# Patient Record
Sex: FEMALE | Race: WHITE | NOT HISPANIC OR LATINO | Employment: OTHER | URBAN - METROPOLITAN AREA
[De-identification: names, ages, dates, MRNs, and addresses within clinical notes are randomized per-mention and may not be internally consistent; named-entity substitution may affect disease eponyms.]

---

## 2017-02-02 ENCOUNTER — APPOINTMENT (OUTPATIENT)
Dept: LAB | Facility: CLINIC | Age: 62
End: 2017-02-02
Payer: COMMERCIAL

## 2017-02-02 ENCOUNTER — TRANSCRIBE ORDERS (OUTPATIENT)
Dept: ADMINISTRATIVE | Facility: HOSPITAL | Age: 62
End: 2017-02-02

## 2017-02-02 DIAGNOSIS — E11.49 DIABETIC NEUROPATHY WITH NEUROLOGIC COMPLICATION (HCC): ICD-10-CM

## 2017-02-02 DIAGNOSIS — E11.9 DIABETES MELLITUS WITHOUT COMPLICATION (HCC): Primary | ICD-10-CM

## 2017-02-02 DIAGNOSIS — E11.40 DIABETIC NEUROPATHY WITH NEUROLOGIC COMPLICATION (HCC): ICD-10-CM

## 2017-02-02 DIAGNOSIS — Z79.4 ENCOUNTER FOR LONG-TERM (CURRENT) USE OF INSULIN (HCC): ICD-10-CM

## 2017-02-02 DIAGNOSIS — E11.40 TYPE 2 DIABETES MELLITUS WITH DIABETIC NEUROPATHY, UNSPECIFIED LONG TERM INSULIN USE STATUS: ICD-10-CM

## 2017-02-02 DIAGNOSIS — E11.9 DIABETES MELLITUS WITHOUT COMPLICATION (HCC): ICD-10-CM

## 2017-02-02 LAB
ALBUMIN SERPL BCP-MCNC: 3.8 G/DL (ref 3.5–5)
ALP SERPL-CCNC: 70 U/L (ref 46–116)
ALT SERPL W P-5'-P-CCNC: 16 U/L (ref 12–78)
ANION GAP SERPL CALCULATED.3IONS-SCNC: 6 MMOL/L (ref 4–13)
AST SERPL W P-5'-P-CCNC: 11 U/L (ref 5–45)
BILIRUB SERPL-MCNC: 0.29 MG/DL (ref 0.2–1)
BUN SERPL-MCNC: 12 MG/DL (ref 5–25)
CALCIUM SERPL-MCNC: 9.3 MG/DL (ref 8.3–10.1)
CHLORIDE SERPL-SCNC: 105 MMOL/L (ref 100–108)
CO2 SERPL-SCNC: 29 MMOL/L (ref 21–32)
CREAT SERPL-MCNC: 0.65 MG/DL (ref 0.6–1.3)
EST. AVERAGE GLUCOSE BLD GHB EST-MCNC: 186 MG/DL
GFR SERPL CREATININE-BSD FRML MDRD: >60 ML/MIN/1.73SQ M
GLUCOSE SERPL-MCNC: 69 MG/DL (ref 65–140)
HBA1C MFR BLD: 8.1 % (ref 4.2–6.3)
POTASSIUM SERPL-SCNC: 4.2 MMOL/L (ref 3.5–5.3)
PROT SERPL-MCNC: 7.3 G/DL (ref 6.4–8.2)
SODIUM SERPL-SCNC: 140 MMOL/L (ref 136–145)

## 2017-02-02 PROCEDURE — 36415 COLL VENOUS BLD VENIPUNCTURE: CPT

## 2017-02-02 PROCEDURE — 83036 HEMOGLOBIN GLYCOSYLATED A1C: CPT

## 2017-02-02 PROCEDURE — 80053 COMPREHEN METABOLIC PANEL: CPT

## 2017-05-19 ENCOUNTER — APPOINTMENT (OUTPATIENT)
Dept: LAB | Facility: CLINIC | Age: 62
End: 2017-05-19
Payer: COMMERCIAL

## 2017-05-19 ENCOUNTER — TRANSCRIBE ORDERS (OUTPATIENT)
Dept: ADMINISTRATIVE | Facility: HOSPITAL | Age: 62
End: 2017-05-19

## 2017-05-19 DIAGNOSIS — E11.49 OTHER DIABETIC NEUROLOGICAL COMPLICATION ASSOCIATED WITH TYPE 2 DIABETES MELLITUS (HCC): ICD-10-CM

## 2017-05-19 DIAGNOSIS — Z79.4 ENCOUNTER FOR LONG-TERM (CURRENT) USE OF INSULIN (HCC): ICD-10-CM

## 2017-05-19 DIAGNOSIS — E11.9 DIABETES MELLITUS WITHOUT COMPLICATION (HCC): Primary | ICD-10-CM

## 2017-05-19 DIAGNOSIS — E11.9 DIABETES MELLITUS WITHOUT COMPLICATION (HCC): ICD-10-CM

## 2017-05-19 LAB
ALBUMIN SERPL BCP-MCNC: 3.5 G/DL (ref 3.5–5)
ALP SERPL-CCNC: 65 U/L (ref 46–116)
ALT SERPL W P-5'-P-CCNC: 17 U/L (ref 12–78)
ANION GAP SERPL CALCULATED.3IONS-SCNC: 6 MMOL/L (ref 4–13)
AST SERPL W P-5'-P-CCNC: 13 U/L (ref 5–45)
BILIRUB SERPL-MCNC: 0.47 MG/DL (ref 0.2–1)
BUN SERPL-MCNC: 17 MG/DL (ref 5–25)
CALCIUM SERPL-MCNC: 9.1 MG/DL (ref 8.3–10.1)
CHLORIDE SERPL-SCNC: 104 MMOL/L (ref 100–108)
CHOLEST SERPL-MCNC: 179 MG/DL (ref 50–200)
CO2 SERPL-SCNC: 31 MMOL/L (ref 21–32)
CREAT SERPL-MCNC: 0.7 MG/DL (ref 0.6–1.3)
CREAT UR-MCNC: 119 MG/DL
EST. AVERAGE GLUCOSE BLD GHB EST-MCNC: 183 MG/DL
GFR SERPL CREATININE-BSD FRML MDRD: >60 ML/MIN/1.73SQ M
GLUCOSE P FAST SERPL-MCNC: 95 MG/DL (ref 65–99)
HBA1C MFR BLD: 8 % (ref 4.2–6.3)
HDLC SERPL-MCNC: 62 MG/DL (ref 40–60)
LDLC SERPL CALC-MCNC: 107 MG/DL (ref 0–100)
MICROALBUMIN UR-MCNC: 6.2 MG/L (ref 0–20)
MICROALBUMIN/CREAT 24H UR: 5 MG/G CREATININE (ref 0–30)
POTASSIUM SERPL-SCNC: 4.4 MMOL/L (ref 3.5–5.3)
PROT SERPL-MCNC: 7.1 G/DL (ref 6.4–8.2)
SODIUM SERPL-SCNC: 141 MMOL/L (ref 136–145)
TRIGL SERPL-MCNC: 52 MG/DL
TSH SERPL DL<=0.05 MIU/L-ACNC: 2.4 UIU/ML (ref 0.36–3.74)

## 2017-05-19 PROCEDURE — 82570 ASSAY OF URINE CREATININE: CPT | Performed by: INTERNAL MEDICINE

## 2017-05-19 PROCEDURE — 82043 UR ALBUMIN QUANTITATIVE: CPT | Performed by: INTERNAL MEDICINE

## 2017-05-19 PROCEDURE — 84443 ASSAY THYROID STIM HORMONE: CPT

## 2017-05-19 PROCEDURE — 80061 LIPID PANEL: CPT

## 2017-05-19 PROCEDURE — 36415 COLL VENOUS BLD VENIPUNCTURE: CPT

## 2017-05-19 PROCEDURE — 80053 COMPREHEN METABOLIC PANEL: CPT

## 2017-05-19 PROCEDURE — 83036 HEMOGLOBIN GLYCOSYLATED A1C: CPT

## 2017-05-30 ENCOUNTER — OFFICE VISIT (OUTPATIENT)
Dept: URGENT CARE | Facility: CLINIC | Age: 62
End: 2017-05-30
Payer: COMMERCIAL

## 2017-05-30 PROCEDURE — 99283 EMERGENCY DEPT VISIT LOW MDM: CPT

## 2017-05-30 PROCEDURE — G0382 LEV 3 HOSP TYPE B ED VISIT: HCPCS

## 2017-06-01 ENCOUNTER — OFFICE VISIT (OUTPATIENT)
Dept: URGENT CARE | Facility: CLINIC | Age: 62
End: 2017-06-01
Payer: COMMERCIAL

## 2017-06-01 PROCEDURE — G0382 LEV 3 HOSP TYPE B ED VISIT: HCPCS

## 2017-06-01 PROCEDURE — 99283 EMERGENCY DEPT VISIT LOW MDM: CPT

## 2017-09-27 ENCOUNTER — TRANSCRIBE ORDERS (OUTPATIENT)
Dept: LAB | Facility: CLINIC | Age: 62
End: 2017-09-27

## 2017-09-27 ENCOUNTER — APPOINTMENT (OUTPATIENT)
Dept: LAB | Facility: CLINIC | Age: 62
End: 2017-09-27
Payer: COMMERCIAL

## 2017-09-27 DIAGNOSIS — E11.8 TYPE 2 DIABETES MELLITUS WITH COMPLICATION, UNSPECIFIED LONG TERM INSULIN USE STATUS: ICD-10-CM

## 2017-09-27 DIAGNOSIS — E11.8 TYPE 2 DIABETES MELLITUS WITH COMPLICATION, UNSPECIFIED LONG TERM INSULIN USE STATUS: Primary | ICD-10-CM

## 2017-09-27 LAB
ALBUMIN SERPL BCP-MCNC: 3.5 G/DL (ref 3.5–5)
ALP SERPL-CCNC: 61 U/L (ref 46–116)
ALT SERPL W P-5'-P-CCNC: 14 U/L (ref 12–78)
ANION GAP SERPL CALCULATED.3IONS-SCNC: 6 MMOL/L (ref 4–13)
AST SERPL W P-5'-P-CCNC: 17 U/L (ref 5–45)
BILIRUB SERPL-MCNC: 0.44 MG/DL (ref 0.2–1)
BUN SERPL-MCNC: 10 MG/DL (ref 5–25)
CALCIUM SERPL-MCNC: 9.3 MG/DL (ref 8.3–10.1)
CHLORIDE SERPL-SCNC: 107 MMOL/L (ref 100–108)
CO2 SERPL-SCNC: 28 MMOL/L (ref 21–32)
CREAT SERPL-MCNC: 0.76 MG/DL (ref 0.6–1.3)
EST. AVERAGE GLUCOSE BLD GHB EST-MCNC: 192 MG/DL
GFR SERPL CREATININE-BSD FRML MDRD: 83 ML/MIN/1.73SQ M
GLUCOSE SERPL-MCNC: 79 MG/DL (ref 65–140)
HBA1C MFR BLD: 8.3 % (ref 4.2–6.3)
POTASSIUM SERPL-SCNC: 4.2 MMOL/L (ref 3.5–5.3)
PROT SERPL-MCNC: 7.1 G/DL (ref 6.4–8.2)
SODIUM SERPL-SCNC: 141 MMOL/L (ref 136–145)

## 2017-09-27 PROCEDURE — 80053 COMPREHEN METABOLIC PANEL: CPT

## 2017-09-27 PROCEDURE — 83036 HEMOGLOBIN GLYCOSYLATED A1C: CPT | Performed by: INTERNAL MEDICINE

## 2017-09-27 PROCEDURE — 36415 COLL VENOUS BLD VENIPUNCTURE: CPT | Performed by: INTERNAL MEDICINE

## 2018-03-05 ENCOUNTER — APPOINTMENT (OUTPATIENT)
Dept: LAB | Facility: CLINIC | Age: 63
End: 2018-03-05
Payer: COMMERCIAL

## 2018-03-05 ENCOUNTER — TRANSCRIBE ORDERS (OUTPATIENT)
Dept: ADMINISTRATIVE | Facility: HOSPITAL | Age: 63
End: 2018-03-05

## 2018-03-05 DIAGNOSIS — E11.9 DIABETES MELLITUS WITHOUT COMPLICATION (HCC): ICD-10-CM

## 2018-03-05 DIAGNOSIS — Z79.4 ENCOUNTER FOR LONG-TERM (CURRENT) USE OF INSULIN (HCC): ICD-10-CM

## 2018-03-05 DIAGNOSIS — E11.49 OTHER DIABETIC NEUROLOGICAL COMPLICATION ASSOCIATED WITH TYPE 2 DIABETES MELLITUS (HCC): ICD-10-CM

## 2018-03-05 DIAGNOSIS — Z79.4 ENCOUNTER FOR LONG-TERM (CURRENT) USE OF INSULIN (HCC): Primary | ICD-10-CM

## 2018-03-05 LAB
ALBUMIN SERPL BCP-MCNC: 3.6 G/DL (ref 3.5–5)
ALP SERPL-CCNC: 95 U/L (ref 46–116)
ALT SERPL W P-5'-P-CCNC: 13 U/L (ref 12–78)
ANION GAP SERPL CALCULATED.3IONS-SCNC: 4 MMOL/L (ref 4–13)
AST SERPL W P-5'-P-CCNC: 16 U/L (ref 5–45)
BILIRUB SERPL-MCNC: 0.55 MG/DL (ref 0.2–1)
BUN SERPL-MCNC: 14 MG/DL (ref 5–25)
CALCIUM SERPL-MCNC: 9 MG/DL (ref 8.3–10.1)
CHLORIDE SERPL-SCNC: 101 MMOL/L (ref 100–108)
CO2 SERPL-SCNC: 31 MMOL/L (ref 21–32)
CREAT SERPL-MCNC: 0.95 MG/DL (ref 0.6–1.3)
EST. AVERAGE GLUCOSE BLD GHB EST-MCNC: 192 MG/DL
GFR SERPL CREATININE-BSD FRML MDRD: 63 ML/MIN/1.73SQ M
GLUCOSE SERPL-MCNC: 320 MG/DL (ref 65–140)
HBA1C MFR BLD: 8.3 % (ref 4.2–6.3)
POTASSIUM SERPL-SCNC: 4.4 MMOL/L (ref 3.5–5.3)
PROT SERPL-MCNC: 6.8 G/DL (ref 6.4–8.2)
SODIUM SERPL-SCNC: 136 MMOL/L (ref 136–145)

## 2018-03-05 PROCEDURE — 80053 COMPREHEN METABOLIC PANEL: CPT

## 2018-03-05 PROCEDURE — 36415 COLL VENOUS BLD VENIPUNCTURE: CPT

## 2018-03-05 PROCEDURE — 83036 HEMOGLOBIN GLYCOSYLATED A1C: CPT

## 2018-03-08 ENCOUNTER — OFFICE VISIT (OUTPATIENT)
Dept: ENDOCRINOLOGY | Facility: HOSPITAL | Age: 63
End: 2018-03-08
Payer: COMMERCIAL

## 2018-03-08 VITALS
SYSTOLIC BLOOD PRESSURE: 120 MMHG | HEART RATE: 74 BPM | BODY MASS INDEX: 39.8 KG/M2 | WEIGHT: 253.6 LBS | DIASTOLIC BLOOD PRESSURE: 90 MMHG | HEIGHT: 67 IN

## 2018-03-08 DIAGNOSIS — E11.40 TYPE 2 DIABETES MELLITUS WITH DIABETIC NEUROPATHY, WITH LONG-TERM CURRENT USE OF INSULIN (HCC): Primary | ICD-10-CM

## 2018-03-08 DIAGNOSIS — E78.5 HYPERLIPIDEMIA, UNSPECIFIED HYPERLIPIDEMIA TYPE: ICD-10-CM

## 2018-03-08 DIAGNOSIS — Z79.4 TYPE 2 DIABETES MELLITUS WITH DIABETIC NEUROPATHY, WITH LONG-TERM CURRENT USE OF INSULIN (HCC): Primary | ICD-10-CM

## 2018-03-08 PROBLEM — I89.0 LYMPHEDEMA: Status: ACTIVE | Noted: 2018-03-08

## 2018-03-08 PROCEDURE — 99204 OFFICE O/P NEW MOD 45 MIN: CPT | Performed by: INTERNAL MEDICINE

## 2018-03-08 RX ORDER — CHLORAL HYDRATE 500 MG
1000 CAPSULE ORAL DAILY
COMMUNITY

## 2018-03-08 NOTE — PATIENT INSTRUCTIONS
Get back on track with regular schedule and diet  Check blood sugars more 3-4 times a day  Increase activity as able with the warmer weather  Medic alert bracelet or necklace recommended  Continue the same insulin doses and metformin for now  Follow up in 4 months with blood work

## 2018-03-08 NOTE — PROGRESS NOTES
3/8/2018    Assessment/Plan      Diagnoses and all orders for this visit:    Type 2 diabetes mellitus with diabetic neuropathy, with long-term current use of insulin ([facility] [address] )  -     Comprehensive metabolic panel Lab Collect; Future  -     HEMOGLOBIN A1C W/ EAG ESTIMATION Lab Collect; Future  -     TSH, 3rd generation Lab Collect; Future  -     Microalbumin / creatinine urine ratio Lab Collect; Future  -     Lipid panel Lab Collect Lab Collect; Future  -     insulin aspart (NovoLOG) 100 Units/mL SOPN; Inject 2-7 Units under the skin 4 (four) times a day  -     insulin detemir (LEVEMIR FLEXTOUCH) subcutaneous injection pen 100 units/mL; Inject 24 Units under the skin daily at bedtime  -     metFORMIN (GLUCOPHAGE) 1000 MG tablet; Take 1 tablet (1,000 mg total) by mouth 2 (two) times a day with meals  -     Insulin Pen Needle ([employer] ULTRA-FINE PEN NEEDLES) 29G X 12 7MM MISC; Use up to 5 times a day  -     glucose blood (TRUE METRIX BLOOD GLUCOSE TEST) test strip; Use as instructed up to 4 times a day    Hyperlipidemia, unspecified hyperlipidemia type  -     Comprehensive metabolic panel Lab Collect; Future  -     Lipid panel Lab Collect Lab Collect; Future    Other orders  -     Calcium Carb-Cholecalciferol (CALCIUM 1000 + D PO); Take by mouth  -     Omega-3 Fatty Acids (FISH OIL) 1,000 mg; Take 1,000 mg by mouth daily  -     Discontinue: metFORMIN (GLUCOPHAGE) 1000 MG tablet; Take 1,000 mg by mouth 2 (two) times a day with meals  -     Discontinue: insulin aspart (NovoLOG) 100 units/mL injection; Inject 2-7 Units under the skin 4 (four) times a day        Assessment/Plan:  1  Type 2 diabetes insulin requiring  Her diabetes control is not the best with a hemoglobin A1c of 8 3%  It has not changed since last visit in October  She admits to dietary indiscretion over the holidays especially with the recent girl  cookies season    She also unfortunately has had a poor schedule in eating habits over the last several months with helping the local [employer] and has not been checking her sugars  I think this has contributed to a lack of improvement in her diabetes control  I discussed with her the with that I would like her hemoglobin A1c around 7%  At this point I will not adjust her insulin dosages are Metformin dosages  I have asked her to get back on track with regular schedule in diet  I have asked her  to increase activity as the warmer weather ensues  I have answered start checking her blood sugars 3 to 4 times a day regularly again  I have recommended that she wear a medic Alert bracelet or necklace regularly  I offer dietary consultation which she has refused at this point  2   Diabetic neuropathy  Her symptoms of diabetic neuropathy are stable  Hopefully they could improve with what with improvement in her diabetes control  I have discussed that with her that there are possible medications to use for the symptoms, she has decided not to use these at this point  I will have her follow up in 4 months with preceding hemoglobin A1c, CMP, TSH, lipid profile, and urine microalbumin to creatinine ratio  CC: Diabetes Consult    History of Present Illness     HPI: Tarry Hashimoto is a 57y o  year old female with type 2 diabetes for 17 years  She is on oral agents and insulin at home and takes Metformin 1000 mg BID, Levemir insulin 20-24 units at [facility], and Novolog insulin 4-6 units at breakfast and lunch, 2-3 units in the afternoon, and 5-7 units at supper  She denies any  polydipsia,  and blurry vision  She has no polyphagia  She has 4 times per night nocturia and polyuria  She has numbness and tingling of her feet without and occasional ice pick pain sensations in her feet at night  She has no chest pain or shortness of breath  She denies nephropathy, retinopathy, heart attack, stroke and claudication but does admit to neuropathy    Last few months, very poor dietary habits and 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  Hypoglycemic episodes: Yes recently within the last month, 10 lows in the early am around 4 am, otherwise not too often and in the afternooon  H/o of hypoglycemia causing hospitalization or intervention such as glucagon injection  or ambulance call  No   Hypoglycemia symptoms: sweating and bright light in vision or bluury vision  Treatment of hypoglycemia: drink a regularsoda     Glucagon:No   Medic alert tag: recommended,No      The patient's last eye exam was in October 2017 and no retinopathy     The patient's last foot exam was in has not seen one in a few years    Last A1C was   Lab Results   Component Value Date    HGBA1C 8 3 (H) 03/05/2018     Blood Sugar/Glucometer/Pump/CGM review: checks blood sugars at least 3 times a day, extra tests as needed  Recently not as good at testing  No blood sugar record with her today  Before breakfast: low at 4 am more  100 or less  Before lunch: no recent testing  Before dinner: varies, 70-80 and up to 110-120  Bedtime: 170-180 if eats after supper    Review of Systems   Constitutional: Negative for activity change, appetite change, diaphoresis, fatigue and unexpected weight change  HENT: Negative for ear pain, hearing loss, tinnitus and trouble swallowing  Eyes: Negative for visual disturbance  Respiratory: Positive for cough  Negative for choking, chest tightness and shortness of breath  Lots of post nasal drip causes coughing  Cardiovascular: Positive for palpitations and leg swelling  Negative for chest pain  Episode last night of tachycardia to 120, not normally  Resolved on own with rest  No change in chronic lymphedema  Uses furosemide as needed,worse in summer months  Gastrointestinal: Negative for abdominal pain, constipation, diarrhea, nausea and vomiting  Endocrine: Positive for polyuria  Negative for polydipsia and polyphagia  Nocturia up to 4 times a night     Musculoskeletal: Positive for arthralgias  Negative for back pain and myalgias  Bilateral knee pain  Skin: Negative for rash  Itchy skin hands, feet, and wrists  Will scratch it  Neurological: Positive for tremors and numbness  Negative for dizziness, light-headedness and headaches  Tingling of feet, Ice pick like pain in feet at times  Has a benign essential tremor  Psychiatric/Behavioral: Negative for sleep disturbance  The patient is not nervous/anxious          Historical Information   Past Medical History:   Diagnosis Date    Asthma     seasonal    Diabetes mellitus (HCC)     Osteoarthritis     Tremor, hereditary, benign      Past Surgical History:   Procedure Laterality Date     SECTION      3    TOE SURGERY Bilateral     5 th toe bone removed    TOTAL HIP ARTHROPLASTY Right     TUBAL LIGATION Bilateral      Social History   History   Alcohol Use    Yes     Comment: socially     History   Drug Use No     History   Smoking Status    Former Smoker    Packs/day:     Years: 15 00    Types: Cigarettes    Quit date: 2007   Smokeless Tobacco    Never Used     Family History:   Family History   Problem Relation Age of Onset    Cirrhosis Mother     Heart disease Mother     Esophageal varices Mother     Brain cancer Father     Prostate cancer Father     Stroke Father     Heart attack Father     Skin cancer Brother     COPD Brother     Diabetes type II Maternal Aunt     Diabetes type II Maternal Grandfather     Parkinsonism Maternal Grandfather     Coronary artery disease Brother     Post-traumatic stress disorder Son     No Known Problems Son     No Known Problems Daughter     Parkinsonism Maternal Aunt     Diabetes type I Cousin        Meds/Allergies   Current Outpatient Prescriptions   Medication Sig Dispense Refill    aspirin 81 MG tablet Take 81 mg by mouth 2 (two) times a day      Biotin 5000 MCG CAPS Take 5,000 capsules by mouth 2 (two) times a day      Calcium Carb-Cholecalciferol (CALCIUM 1000 + D PO) Take by mouth      ferrous sulfate 325 (65 Fe) mg tablet Take 325 mg by mouth 2 (two) times a day      folic acid (FOLVITE) 100 mcg tablet Take 400 mcg by mouth 2 (two) times a day      metFORMIN (GLUCOPHAGE) 1000 MG tablet Take 1 tablet (1,000 mg total) by mouth 2 (two) times a day with meals 180 tablet 3    Omega-3 Fatty Acids (FISH OIL) 1,000 mg Take 1,000 mg by mouth daily      glucose blood (TRUE METRIX BLOOD GLUCOSE TEST) test strip Use as instructed up to 4 times a day 400 each 3    insulin aspart (NovoLOG) 100 Units/mL SOPN Inject 2-7 Units under the skin 4 (four) times a day 20 pen 3    insulin detemir (LEVEMIR FLEXTOUCH) subcutaneous injection pen 100 units/mL Inject 24 Units under the skin daily at bedtime 10 pen 3    Insulin Pen Needle (BD ULTRA-FINE PEN NEEDLES) 29G X 12 7MM MISC Use up to 5 times a day 500 each 3    methocarbamol (ROBAXIN) 500 mg tablet Take 1 tablet by mouth 4 (four) times a day as needed for muscle spasms 20 tablet 0     No current facility-administered medications for this visit  Allergies   Allergen Reactions    Actos [Pioglitazone] Edema     Severe lower extremity    Oxycodone GI Intolerance    Sulfa Antibiotics        Objective   Vitals: Blood pressure 120/90, pulse 74, height 5' 7 32" (1 71 m), weight 115 kg (253 lb 9 6 oz)  Invasive Devices          No matching active lines, drains, or airways          Physical Exam   Constitutional: She is oriented to person, place, and time  She appears well-developed and well-nourished  HENT:   Head: Normocephalic and atraumatic  Eyes: Conjunctivae and EOM are normal  Pupils are equal, round, and reactive to light  Neck: Normal range of motion  Neck supple  No thyromegaly present  No carotid bruits  Cardiovascular: Normal rate, regular rhythm, normal heart sounds and intact distal pulses  No murmur heard    BP recheck 122/84   Pulmonary/Chest: Effort normal and breath sounds normal  She has no wheezes  Abdominal: Soft  Bowel sounds are normal  There is no tenderness  Musculoskeletal: Normal range of motion  She exhibits edema and deformity  Bilateral 1st metatarsal phalangeal joint bunions  No ulcerations  Small callus on medial part of 1st toe  2+ edema of legs  Lymphadenopathy:     She has no cervical adenopathy  Neurological: She is alert and oriented to person, place, and time  She has normal reflexes  Vibration sensation diminished ti the bilateral lower extremities at DIP  Skin: Skin is warm and dry  No rash noted  Excoriations of dorsum of feet and hands from scratching  Vitals reviewed  The history was obtained from the review of the chart and from the patient  Lab Results:    Most recent Alc is  Lab Results   Component Value Date    HGBA1C 8 3 (H) 03/05/2018           CMP on 03/05/2018 showed a glucose of 320 random but was otherwise normal  Last TSH was 2 4, urine microalbumin to creatinine ratio was 5, total cholesterol 179, triglycerides 52, HDL 62, and  which were done on 05/19/2017      Lab Results   Component Value Date    CREATININE 0 95 03/05/2018    CREATININE 0 76 09/27/2017    CREATININE 0 70 05/19/2017    BUN 14 03/05/2018     03/05/2018    K 4 4 03/05/2018     03/05/2018    CO2 31 03/05/2018     eGFR   Date Value Ref Range Status   03/05/2018 63 ml/min/1 73sq m Final         Lab Results   Component Value Date    CHOL 179 05/19/2017    HDL 62 (H) 05/19/2017    TRIG 52 05/19/2017       Lab Results   Component Value Date    ALT 13 03/05/2018    AST 16 03/05/2018    ALKPHOS 95 03/05/2018    BILITOT 0 55 03/05/2018           Recent Results (from the past 00972 hour(s))   Comprehensive metabolic panel    Collection Time: 02/02/17 11:30 AM   Result Value Ref Range    Sodium 140 136 - 145 mmol/L    Potassium 4 2 3 5 - 5 3 mmol/L    Chloride 105 100 - 108 mmol/L    CO2 29 21 - 32 mmol/L    Anion Gap 6 4 - 13 mmol/L BUN 12 5 - 25 mg/dL    Creatinine 0 65 0 60 - 1 30 mg/dL    Glucose 69 65 - 140 mg/dL    Calcium 9 3 8 3 - 10 1 mg/dL    AST 11 5 - 45 U/L    ALT 16 12 - 78 U/L    Alkaline Phosphatase 70 46 - 116 U/L    Total Protein 7 3 6 4 - 8 2 g/dL    Albumin 3 8 3 5 - 5 0 g/dL    Total Bilirubin 0 29 0 20 - 1 00 mg/dL    eGFR >60 0 ml/min/1 73sq m   Hemoglobin A1c    Collection Time: 02/02/17 11:30 AM   Result Value Ref Range    Hemoglobin A1C 8 1 (H) 4 2 - 6 3 %     mg/dl   Microalbumin / creatinine urine ratio    Collection Time: 05/19/17  8:28 AM   Result Value Ref Range    Creatinine, Ur 119 0 mg/dL    Microalbum  ,U,Random 6 2 0 0 - 20 0 mg/L    Microalb Creat Ratio 5 0 - 30 mg/g creatinine   Comprehensive metabolic panel    Collection Time: 05/19/17  8:28 AM   Result Value Ref Range    Sodium 141 136 - 145 mmol/L    Potassium 4 4 3 5 - 5 3 mmol/L    Chloride 104 100 - 108 mmol/L    CO2 31 21 - 32 mmol/L    Anion Gap 6 4 - 13 mmol/L    BUN 17 5 - 25 mg/dL    Creatinine 0 70 0 60 - 1 30 mg/dL    Glucose, Fasting 95 65 - 99 mg/dL    Calcium 9 1 8 3 - 10 1 mg/dL    AST 13 5 - 45 U/L    ALT 17 12 - 78 U/L    Alkaline Phosphatase 65 46 - 116 U/L    Total Protein 7 1 6 4 - 8 2 g/dL    Albumin 3 5 3 5 - 5 0 g/dL    Total Bilirubin 0 47 0 20 - 1 00 mg/dL    eGFR >60 0 ml/min/1 73sq m   Hemoglobin A1c    Collection Time: 05/19/17  8:28 AM   Result Value Ref Range    Hemoglobin A1C 8 0 (H) 4 2 - 6 3 %     mg/dl   Lipid panel    Collection Time: 05/19/17  8:28 AM   Result Value Ref Range    Cholesterol 179 50 - 200 mg/dL    Triglycerides 52 <=150 mg/dL    HDL, Direct 62 (H) 40 - 60 mg/dL    LDL Calculated 107 (H) 0 - 100 mg/dL   TSH, 3rd generation    Collection Time: 05/19/17  8:28 AM   Result Value Ref Range    TSH 3RD GENERATON 2 400 0 358 - 3 740 uIU/mL   Hemoglobin A1c    Collection Time: 09/27/17 11:41 AM   Result Value Ref Range    Hemoglobin A1C 8 3 (H) 4 2 - 6 3 %     mg/dl   Comprehensive metabolic panel    Collection Time: 09/27/17 11:41 AM   Result Value Ref Range    Sodium 141 136 - 145 mmol/L    Potassium 4 2 3 5 - 5 3 mmol/L    Chloride 107 100 - 108 mmol/L    CO2 28 21 - 32 mmol/L    Anion Gap 6 4 - 13 mmol/L    BUN 10 5 - 25 mg/dL    Creatinine 0 76 0 60 - 1 30 mg/dL    Glucose 79 65 - 140 mg/dL    Calcium 9 3 8 3 - 10 1 mg/dL    AST 17 5 - 45 U/L    ALT 14 12 - 78 U/L    Alkaline Phosphatase 61 46 - 116 U/L    Total Protein 7 1 6 4 - 8 2 g/dL    Albumin 3 5 3 5 - 5 0 g/dL    Total Bilirubin 0 44 0 20 - 1 00 mg/dL    eGFR 83 ml/min/1 73sq m   Comprehensive metabolic panel    Collection Time: 03/05/18  8:56 AM   Result Value Ref Range    Sodium 136 136 - 145 mmol/L    Potassium 4 4 3 5 - 5 3 mmol/L    Chloride 101 100 - 108 mmol/L    CO2 31 21 - 32 mmol/L    Anion Gap 4 4 - 13 mmol/L    BUN 14 5 - 25 mg/dL    Creatinine 0 95 0 60 - 1 30 mg/dL    Glucose 320 (H) 65 - 140 mg/dL    Calcium 9 0 8 3 - 10 1 mg/dL    AST 16 5 - 45 U/L    ALT 13 12 - 78 U/L    Alkaline Phosphatase 95 46 - 116 U/L    Total Protein 6 8 6 4 - 8 2 g/dL    Albumin 3 6 3 5 - 5 0 g/dL    Total Bilirubin 0 55 0 20 - 1 00 mg/dL    eGFR 63 ml/min/1 73sq m   HEMOGLOBIN A1C W/ EAG ESTIMATION    Collection Time: 03/05/18  8:56 AM   Result Value Ref Range    Hemoglobin A1C 8 3 (H) 4 2 - 6 3 %     mg/dl         Future Appointments  Date Time Provider Hafsa Calvin   7/10/2018 11:15 AM LIZETT Olmedo [facility]

## 2018-03-08 NOTE — LETTER
March 8, 2018     Ellen DO Matthew  [address] [address]  [place] [id number]    Patient: Annabella Warner   YOB: 1955   Date of Visit: 3/8/2018       Dear Dr Zak Gallegos: Thank you for referring Maverick Toure to me for evaluation  Below are my notes for this consultation  If you have questions, please do not hesitate to call me  I look forward to following your patient along with you  Sincerely,        Len Wood MD        CC: No Recipients  Len Wood MD  3/8/2018 12:43 PM  Sign at close encounter  3/8/2018    Assessment/Plan      Diagnoses and all orders for this visit:    Type 2 diabetes mellitus with diabetic neuropathy, with long-term current use of insulin ([facility] )  -     Comprehensive metabolic panel Lab Collect; Future  -     HEMOGLOBIN A1C W/ EAG ESTIMATION Lab Collect; Future  -     TSH, 3rd generation Lab Collect; Future  -     Microalbumin / creatinine urine ratio Lab Collect; Future  -     Lipid panel Lab Collect Lab Collect; Future  -     insulin aspart (NovoLOG) 100 Units/mL SOPN; Inject 2-7 Units under the skin 4 (four) times a day  -     insulin detemir (LEVEMIR FLEXTOUCH) subcutaneous injection pen 100 units/mL; Inject 24 Units under the skin daily at bedtime  -     metFORMIN (GLUCOPHAGE) 1000 MG tablet; Take 1 tablet (1,000 mg total) by mouth 2 (two) times a day with meals  -     Insulin Pen Needle (BD ULTRA-FINE PEN NEEDLES) 29G X 12 7MM MISC; Use up to 5 times a day  -     glucose blood (TRUE METRIX BLOOD GLUCOSE TEST) test strip; Use as instructed up to 4 times a day    Hyperlipidemia, unspecified hyperlipidemia type  -     Comprehensive metabolic panel Lab Collect; Future  -     Lipid panel Lab Collect Lab Collect; Future    Other orders  -     Calcium Carb-Cholecalciferol (CALCIUM 1000 + D PO); Take by mouth  -     Omega-3 Fatty Acids (FISH OIL) 1,000 mg; Take 1,000 mg by mouth daily  -     Discontinue: metFORMIN (GLUCOPHAGE) 1000 MG tablet;  Take 1,000 mg by mouth 2 (two) times a day with meals  -     Discontinue: insulin aspart (NovoLOG) 100 units/mL injection; Inject 2-7 Units under the skin 4 (four) times a day        Assessment/Plan:  1  Type 2 diabetes insulin requiring  Her diabetes control is not the best with a hemoglobin A1c of 8 3%  It has not changed since last visit in October  She admits to dietary indiscretion over the holidays especially with the recent girl  cookies season  She also unfortunately has had a poor schedule in eating habits over the last several months with 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 and has not been checking her sugars  I think this has contributed to a lack of improvement in her diabetes control  I discussed with her the with that I would like her hemoglobin A1c around 7%  At this point I will not adjust her insulin dosages are Metformin dosages  I have asked her to get back on track with regular schedule in diet  I have asked her  to increase activity as the warmer weather ensues  I have answered start checking her blood sugars 3 to 4 times a day regularly again  I have recommended that she wear a medic Alert bracelet or necklace regularly  I offer dietary consultation which she has refused at this point  2   Diabetic neuropathy  Her symptoms of diabetic neuropathy are stable  Hopefully they could improve with what with improvement in her diabetes control  I have discussed that with her that there are possible medications to use for the symptoms, she has decided not to use these at this point  I will have her follow up in 4 months with preceding hemoglobin A1c, CMP, TSH, lipid profile, and urine microalbumin to creatinine ratio  CC: Diabetes Consult    History of Present Illness     HPI: Joey Hancock is a 57y o  year old female with type 2 diabetes for 17 years    She is on oral agents and insulin at home and takes Metformin 1000 mg BID, Levemir insulin 20-24 units at [facility], and Novolog insulin 4-6 units at breakfast and lunch, 2-3 units in the afternoon, and 5-7 units at supper  She denies any  polydipsia,  and blurry vision  She has no polyphagia  She has 4 times per night nocturia and polyuria  She has numbness and tingling of her feet without and occasional ice pick pain sensations in her feet at night  She has no chest pain or shortness of breath  She denies nephropathy, retinopathy, heart attack, stroke and claudication but does admit to neuropathy  Last few months, very poor dietary habits and 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  Hypoglycemic episodes: Yes recently within the last month, 10 lows in the early am around 4 am, otherwise not too often and in the afternooon  H/o of hypoglycemia causing hospitalization or intervention such as glucagon injection  or ambulance call  No   Hypoglycemia symptoms: sweating and bright light in vision or bluury vision  Treatment of hypoglycemia: drink a regularsoda     Glucagon:No   Medic alert tag: recommended,No      The patient's last eye exam was in October 2017 and no retinopathy     The patient's last foot exam was in has not seen one in a few years    Last A1C was   Lab Results   Component Value Date    HGBA1C 8 3 (H) 03/05/2018     Blood Sugar/Glucometer/Pump/CGM review: checks blood sugars at least 3 times a day, extra tests as needed  Recently not as good at testing  No blood sugar record with her today  Before breakfast: low at 4 am more  100 or less  Before lunch: no recent testing  Before dinner: varies, 70-80 and up to 110-120  Bedtime: 170-180 if eats after supper    Review of Systems   Constitutional: Negative for activity change, appetite change, diaphoresis, fatigue and unexpected weight change  HENT: Negative for ear pain, hearing loss, tinnitus and trouble swallowing  Eyes: Negative for visual disturbance  Respiratory: Positive for cough   Negative for choking, chest tightness and shortness of breath  Lots of post nasal drip causes coughing  Cardiovascular: Positive for palpitations and leg swelling  Negative for chest pain  Episode last night of tachycardia to 120, not normally  Resolved on own with rest  No change in chronic lymphedema  Uses furosemide as needed,worse in summer months  Gastrointestinal: Negative for abdominal pain, constipation, diarrhea, nausea and vomiting  Endocrine: Positive for polyuria  Negative for polydipsia and polyphagia  Nocturia up to 4 times a night  Musculoskeletal: Positive for arthralgias  Negative for back pain and myalgias  Bilateral knee pain  Skin: Negative for rash  Itchy skin hands, feet, and wrists  Will scratch it  Neurological: Positive for tremors and numbness  Negative for dizziness, light-headedness and headaches  Tingling of feet, Ice pick like pain in feet at times  Has a benign essential tremor  Psychiatric/Behavioral: Negative for sleep disturbance  The patient is not nervous/anxious          Historical Information   Past Medical History:   Diagnosis Date    Asthma     seasonal    Diabetes mellitus ([facility] )     Osteoarthritis     Tremor, hereditary, benign      Past Surgical History:   Procedure Laterality Date     SECTION      3    TOE SURGERY Bilateral     5 th toe bone removed    TOTAL HIP ARTHROPLASTY Right     TUBAL LIGATION Bilateral      Social History   History   Alcohol Use    Yes     Comment: socially     History   Drug Use No     History   Smoking Status    Former Smoker    Packs/day:  00    Years: 15 00    Types: Cigarettes    Quit date: 2007   Smokeless Tobacco    Never Used     Family History:   Family History   Problem Relation Age of Onset    Cirrhosis Mother     Heart disease Mother     Esophageal varices Mother     Brain cancer Father     Prostate cancer Father     Stroke Father     Heart attack Father     Skin cancer Brother     COPD Brother     Diabetes type II Maternal Aunt     Diabetes type II Maternal Grandfather     Parkinsonism Maternal Grandfather     Coronary artery disease Brother     Post-traumatic stress disorder Son     No Known Problems Son     No Known Problems Daughter     Parkinsonism Maternal Aunt     Diabetes type I Cousin        Meds/Allergies   Current Outpatient Prescriptions   Medication Sig Dispense Refill    aspirin 81 MG tablet Take 81 mg by mouth 2 (two) times a day      Biotin 5000 MCG CAPS Take 5,000 capsules by mouth 2 (two) times a day      Calcium Carb-Cholecalciferol (CALCIUM 1000 + D PO) Take by mouth      ferrous sulfate 325 (65 Fe) mg tablet Take 325 mg by mouth 2 (two) times a day      folic acid (FOLVITE) 892 mcg tablet Take 400 mcg by mouth 2 (two) times a day      metFORMIN (GLUCOPHAGE) 1000 MG tablet Take 1 tablet (1,000 mg total) by mouth 2 (two) times a day with meals 180 tablet 3    Omega-3 Fatty Acids (FISH OIL) 1,000 mg Take 1,000 mg by mouth daily      glucose blood (TRUE METRIX BLOOD GLUCOSE TEST) test strip Use as instructed up to 4 times a day 400 each 3    insulin aspart (NovoLOG) 100 Units/mL SOPN Inject 2-7 Units under the skin 4 (four) times a day 20 pen 3    insulin detemir (LEVEMIR FLEXTOUCH) subcutaneous injection pen 100 units/mL Inject 24 Units under the skin daily at bedtime 10 pen 3    Insulin Pen Needle (BD ULTRA-FINE PEN NEEDLES) 29G X 12 7MM MISC Use up to 5 times a day 500 each 3    methocarbamol (ROBAXIN) 500 mg tablet Take 1 tablet by mouth 4 (four) times a day as needed for muscle spasms 20 tablet 0     No current facility-administered medications for this visit  Allergies   Allergen Reactions    Actos [Pioglitazone] Edema     Severe lower extremity    Oxycodone GI Intolerance    Sulfa Antibiotics        Objective   Vitals: Blood pressure 120/90, pulse 74, height 5' 7 32" (1 71 m), weight 115 kg (253 lb 9 6 oz)    Invasive Devices          No matching active lines, drains, or airways          Physical Exam   Constitutional: She is oriented to person, place, and time  She appears well-developed and well-nourished  HENT:   Head: Normocephalic and atraumatic  Eyes: Conjunctivae and EOM are normal  Pupils are equal, round, and reactive to light  Neck: Normal range of motion  Neck supple  No thyromegaly present  No carotid bruits  Cardiovascular: Normal rate, regular rhythm, normal heart sounds and intact distal pulses  No murmur heard  BP recheck 122/84   Pulmonary/Chest: Effort normal and breath sounds normal  She has no wheezes  Abdominal: Soft  Bowel sounds are normal  There is no tenderness  Musculoskeletal: Normal range of motion  She exhibits edema and deformity  Bilateral 1st metatarsal phalangeal joint bunions  No ulcerations  Small callus on medial part of 1st toe  2+ edema of legs  Lymphadenopathy:     She has no cervical adenopathy  Neurological: She is alert and oriented to person, place, and time  She has normal reflexes  Vibration sensation diminished ti the bilateral lower extremities at DIP  Skin: Skin is warm and dry  No rash noted  Excoriations of dorsum of feet and hands from scratching  Vitals reviewed  The history was obtained from the review of the chart and from the patient  Lab Results:    Most recent Alc is  Lab Results   Component Value Date    HGBA1C 8 3 (H) 03/05/2018           CMP on 03/05/2018 showed a glucose of 320 random but was otherwise normal  Last TSH was 2 4, urine microalbumin to creatinine ratio was 5, total cholesterol 179, triglycerides 52, HDL 62, and  which were done on 05/19/2017      Lab Results   Component Value Date    CREATININE 0 95 03/05/2018    CREATININE 0 76 09/27/2017    CREATININE 0 70 05/19/2017    BUN 14 03/05/2018     03/05/2018    K 4 4 03/05/2018     03/05/2018    CO2 31 03/05/2018     eGFR   Date Value Ref Range Status   03/05/2018 63 ml/min/1 73sq m Final Lab Results   Component Value Date    CHOL 179 05/19/2017    HDL 62 (H) 05/19/2017    TRIG 52 05/19/2017       Lab Results   Component Value Date    ALT 13 03/05/2018    AST 16 03/05/2018    ALKPHOS 95 03/05/2018    BILITOT 0 55 03/05/2018           Recent Results (from the past 85217 hour(s))   Comprehensive metabolic panel    Collection Time: 02/02/17 11:30 AM   Result Value Ref Range    Sodium 140 136 - 145 mmol/L    Potassium 4 2 3 5 - 5 3 mmol/L    Chloride 105 100 - 108 mmol/L    CO2 29 21 - 32 mmol/L    Anion Gap 6 4 - 13 mmol/L    BUN 12 5 - 25 mg/dL    Creatinine 0 65 0 60 - 1 30 mg/dL    Glucose 69 65 - 140 mg/dL    Calcium 9 3 8 3 - 10 1 mg/dL    AST 11 5 - 45 U/L    ALT 16 12 - 78 U/L    Alkaline Phosphatase 70 46 - 116 U/L    Total Protein 7 3 6 4 - 8 2 g/dL    Albumin 3 8 3 5 - 5 0 g/dL    Total Bilirubin 0 29 0 20 - 1 00 mg/dL    eGFR >60 0 ml/min/1 73sq m   Hemoglobin A1c    Collection Time: 02/02/17 11:30 AM   Result Value Ref Range    Hemoglobin A1C 8 1 (H) 4 2 - 6 3 %     mg/dl   Microalbumin / creatinine urine ratio    Collection Time: 05/19/17  8:28 AM   Result Value Ref Range    Creatinine, Ur 119 0 mg/dL    Microalbum  ,U,Random 6 2 0 0 - 20 0 mg/L    Microalb Creat Ratio 5 0 - 30 mg/g creatinine   Comprehensive metabolic panel    Collection Time: 05/19/17  8:28 AM   Result Value Ref Range    Sodium 141 136 - 145 mmol/L    Potassium 4 4 3 5 - 5 3 mmol/L    Chloride 104 100 - 108 mmol/L    CO2 31 21 - 32 mmol/L    Anion Gap 6 4 - 13 mmol/L    BUN 17 5 - 25 mg/dL    Creatinine 0 70 0 60 - 1 30 mg/dL    Glucose, Fasting 95 65 - 99 mg/dL    Calcium 9 1 8 3 - 10 1 mg/dL    AST 13 5 - 45 U/L    ALT 17 12 - 78 U/L    Alkaline Phosphatase 65 46 - 116 U/L    Total Protein 7 1 6 4 - 8 2 g/dL    Albumin 3 5 3 5 - 5 0 g/dL    Total Bilirubin 0 47 0 20 - 1 00 mg/dL    eGFR >60 0 ml/min/1 73sq m   Hemoglobin A1c    Collection Time: 05/19/17  8:28 AM   Result Value Ref Range    Hemoglobin A1C 8 0 (H) 4 2 - 6 3 %     mg/dl   Lipid panel    Collection Time: 05/19/17  8:28 AM   Result Value Ref Range    Cholesterol 179 50 - 200 mg/dL    Triglycerides 52 <=150 mg/dL    HDL, Direct 62 (H) 40 - 60 mg/dL    LDL Calculated 107 (H) 0 - 100 mg/dL   TSH, 3rd generation    Collection Time: 05/19/17  8:28 AM   Result Value Ref Range    TSH 3RD GENERATON 2 400 0 358 - 3 740 uIU/mL   Hemoglobin A1c    Collection Time: 09/27/17 11:41 AM   Result Value Ref Range    Hemoglobin A1C 8 3 (H) 4 2 - 6 3 %     mg/dl   Comprehensive metabolic panel    Collection Time: 09/27/17 11:41 AM   Result Value Ref Range    Sodium 141 136 - 145 mmol/L    Potassium 4 2 3 5 - 5 3 mmol/L    Chloride 107 100 - 108 mmol/L    CO2 28 21 - 32 mmol/L    Anion Gap 6 4 - 13 mmol/L    BUN 10 5 - 25 mg/dL    Creatinine 0 76 0 60 - 1 30 mg/dL    Glucose 79 65 - 140 mg/dL    Calcium 9 3 8 3 - 10 1 mg/dL    AST 17 5 - 45 U/L    ALT 14 12 - 78 U/L    Alkaline Phosphatase 61 46 - 116 U/L    Total Protein 7 1 6 4 - 8 2 g/dL    Albumin 3 5 3 5 - 5 0 g/dL    Total Bilirubin 0 44 0 20 - 1 00 mg/dL    eGFR 83 ml/min/1 73sq m   Comprehensive metabolic panel    Collection Time: 03/05/18  8:56 AM   Result Value Ref Range    Sodium 136 136 - 145 mmol/L    Potassium 4 4 3 5 - 5 3 mmol/L    Chloride 101 100 - 108 mmol/L    CO2 31 21 - 32 mmol/L    Anion Gap 4 4 - 13 mmol/L    BUN 14 5 - 25 mg/dL    Creatinine 0 95 0 60 - 1 30 mg/dL    Glucose 320 (H) 65 - 140 mg/dL    Calcium 9 0 8 3 - 10 1 mg/dL    AST 16 5 - 45 U/L    ALT 13 12 - 78 U/L    Alkaline Phosphatase 95 46 - 116 U/L    Total Protein 6 8 6 4 - 8 2 g/dL    Albumin 3 6 3 5 - 5 0 g/dL    Total Bilirubin 0 55 0 20 - 1 00 mg/dL    eGFR 63 ml/min/1 73sq m   HEMOGLOBIN A1C W/ EAG ESTIMATION    Collection Time: 03/05/18  8:56 AM   Result Value Ref Range    Hemoglobin A1C 8 3 (H) 4 2 - 6 3 %     mg/dl         Future Appointments  Date Time Provider Hafsa Calvin   7/10/2018 11:15 AM Charley Robertson [place], [address]  [facility]

## 2018-04-06 ENCOUNTER — TELEPHONE (OUTPATIENT)
Dept: ENDOCRINOLOGY | Facility: HOSPITAL | Age: 63
End: 2018-04-06

## 2018-04-06 DIAGNOSIS — Z79.4 TYPE 2 DIABETES MELLITUS WITH DIABETIC NEUROPATHY, WITH LONG-TERM CURRENT USE OF INSULIN (HCC): Primary | ICD-10-CM

## 2018-04-06 DIAGNOSIS — E11.40 TYPE 2 DIABETES MELLITUS WITH DIABETIC NEUROPATHY, WITH LONG-TERM CURRENT USE OF INSULIN (HCC): Primary | ICD-10-CM

## 2018-04-06 NOTE — TELEPHONE ENCOUNTER
Patient needs a 90 day script of humalog to replace her novalog   She needs the script to the professional pharmacy in [place]

## 2018-07-07 ENCOUNTER — APPOINTMENT (OUTPATIENT)
Dept: LAB | Facility: CLINIC | Age: 63
End: 2018-07-07

## 2018-07-07 DIAGNOSIS — E11.40 TYPE 2 DIABETES MELLITUS WITH DIABETIC NEUROPATHY, WITH LONG-TERM CURRENT USE OF INSULIN (HCC): ICD-10-CM

## 2018-07-07 DIAGNOSIS — Z79.4 TYPE 2 DIABETES MELLITUS WITH DIABETIC NEUROPATHY, WITH LONG-TERM CURRENT USE OF INSULIN (HCC): ICD-10-CM

## 2018-07-07 DIAGNOSIS — E78.5 HYPERLIPIDEMIA, UNSPECIFIED HYPERLIPIDEMIA TYPE: ICD-10-CM

## 2018-07-07 LAB
ALBUMIN SERPL BCP-MCNC: 3.6 G/DL (ref 3.5–5)
ALP SERPL-CCNC: 64 U/L (ref 46–116)
ALT SERPL W P-5'-P-CCNC: 17 U/L (ref 12–78)
ANION GAP SERPL CALCULATED.3IONS-SCNC: 3 MMOL/L (ref 4–13)
AST SERPL W P-5'-P-CCNC: 14 U/L (ref 5–45)
BILIRUB SERPL-MCNC: 0.43 MG/DL (ref 0.2–1)
BUN SERPL-MCNC: 12 MG/DL (ref 5–25)
CALCIUM SERPL-MCNC: 9.8 MG/DL (ref 8.3–10.1)
CHLORIDE SERPL-SCNC: 102 MMOL/L (ref 100–108)
CHOLEST SERPL-MCNC: 174 MG/DL (ref 50–200)
CO2 SERPL-SCNC: 32 MMOL/L (ref 21–32)
CREAT SERPL-MCNC: 0.7 MG/DL (ref 0.6–1.3)
CREAT UR-MCNC: 165 MG/DL
EST. AVERAGE GLUCOSE BLD GHB EST-MCNC: 171 MG/DL
GFR SERPL CREATININE-BSD FRML MDRD: 92 ML/MIN/1.73SQ M
GLUCOSE P FAST SERPL-MCNC: 51 MG/DL (ref 65–99)
HBA1C MFR BLD: 7.6 % (ref 4.2–6.3)
HDLC SERPL-MCNC: 59 MG/DL (ref 40–60)
LDLC SERPL CALC-MCNC: 104 MG/DL (ref 0–100)
MICROALBUMIN UR-MCNC: 31.9 MG/L (ref 0–20)
MICROALBUMIN/CREAT 24H UR: 19 MG/G CREATININE (ref 0–30)
NONHDLC SERPL-MCNC: 115 MG/DL
POTASSIUM SERPL-SCNC: 4.1 MMOL/L (ref 3.5–5.3)
PROT SERPL-MCNC: 7.1 G/DL (ref 6.4–8.2)
SODIUM SERPL-SCNC: 137 MMOL/L (ref 136–145)
TRIGL SERPL-MCNC: 56 MG/DL
TSH SERPL DL<=0.05 MIU/L-ACNC: 1.82 UIU/ML (ref 0.36–3.74)

## 2018-07-07 PROCEDURE — 82043 UR ALBUMIN QUANTITATIVE: CPT

## 2018-07-07 PROCEDURE — 80053 COMPREHEN METABOLIC PANEL: CPT

## 2018-07-07 PROCEDURE — 80061 LIPID PANEL: CPT

## 2018-07-07 PROCEDURE — 82570 ASSAY OF URINE CREATININE: CPT

## 2018-07-07 PROCEDURE — 83036 HEMOGLOBIN GLYCOSYLATED A1C: CPT

## 2018-07-07 PROCEDURE — 84443 ASSAY THYROID STIM HORMONE: CPT

## 2018-07-07 PROCEDURE — 36415 COLL VENOUS BLD VENIPUNCTURE: CPT

## 2018-07-09 RX ORDER — INSULIN ASPART 100 [IU]/ML
INJECTION, SUSPENSION SUBCUTANEOUS
COMMUNITY
End: 2018-07-11

## 2018-07-09 RX ORDER — FLUTICASONE PROPIONATE 50 MCG
1 SPRAY, SUSPENSION (ML) NASAL 2 TIMES DAILY
COMMUNITY
Start: 2017-05-30 | End: 2020-04-16 | Stop reason: ALTCHOICE

## 2018-07-11 ENCOUNTER — OFFICE VISIT (OUTPATIENT)
Dept: ENDOCRINOLOGY | Facility: HOSPITAL | Age: 63
End: 2018-07-11
Payer: COMMERCIAL

## 2018-07-11 VITALS
HEART RATE: 70 BPM | SYSTOLIC BLOOD PRESSURE: 134 MMHG | HEIGHT: 67 IN | BODY MASS INDEX: 40.06 KG/M2 | WEIGHT: 255.2 LBS | DIASTOLIC BLOOD PRESSURE: 90 MMHG

## 2018-07-11 DIAGNOSIS — E78.5 HYPERLIPIDEMIA, UNSPECIFIED HYPERLIPIDEMIA TYPE: ICD-10-CM

## 2018-07-11 DIAGNOSIS — R80.9 MICROALBUMINURIA: ICD-10-CM

## 2018-07-11 DIAGNOSIS — E11.40 TYPE 2 DIABETES MELLITUS WITH DIABETIC NEUROPATHY, WITH LONG-TERM CURRENT USE OF INSULIN (HCC): Primary | ICD-10-CM

## 2018-07-11 DIAGNOSIS — I10 ESSENTIAL HYPERTENSION: ICD-10-CM

## 2018-07-11 DIAGNOSIS — Z79.4 TYPE 2 DIABETES MELLITUS WITH DIABETIC NEUROPATHY, WITH LONG-TERM CURRENT USE OF INSULIN (HCC): Primary | ICD-10-CM

## 2018-07-11 PROCEDURE — 99214 OFFICE O/P EST MOD 30 MIN: CPT | Performed by: NURSE PRACTITIONER

## 2018-07-11 RX ORDER — FUROSEMIDE 40 MG/1
40 TABLET ORAL DAILY
COMMUNITY
Start: 2017-10-16 | End: 2020-04-16 | Stop reason: SDUPTHER

## 2018-07-11 RX ORDER — LISINOPRIL 2.5 MG/1
2.5 TABLET ORAL DAILY
Qty: 90 TABLET | Refills: 1 | Status: SHIPPED | OUTPATIENT
Start: 2018-07-11 | End: 2018-10-16 | Stop reason: SDUPTHER

## 2018-07-11 RX ORDER — ALBUTEROL SULFATE 90 UG/1
AEROSOL, METERED RESPIRATORY (INHALATION)
COMMUNITY
Start: 2018-03-30

## 2018-07-11 NOTE — PROGRESS NOTES
Joann Brewer 57 y o  female MRN: [id number]    Encounter: [id number]      Assessment/Plan     Assessment: This is a 57y o -year-old female with type 2 diabetes with neuropathy, hypertension and hyperlipidemia  Plan:  1  Type 2 diabetes insulin requiring:  Her most recent hemoglobin A1c has improved to 7 6  She is not currently taking her Humalog before meals, which may explain her complaint of sporadic hypoglycemia  Discussed proper process for taking Humalog before meals  No changes were made to her regimen at this time  She will inject her insulin before her meals and continue to check her blood sugars 3-4 times daily  I have asked her to for the record of her blood sugars to the office in 1-2 weeks for review so further changes can be made to help her blood sugars throughout the day  Check hemoglobin A1c prior to next visit  2   Hypertension/microalbuminuria:  Her blood pressure is slightly elevated in the office today and her most recent urine microalbumin was also slightly elevated  I have asked her to start lisinopril 2 5 mg daily  Check comprehensive metabolic panel and urine microalbumin prior to next visit  3   Hyperlipidemia:  Continue Omega 3 fatty acid fish oil  CC:  Type 2 Diabetes follow-up    History of Present Illness     HPI:  57y o  year old female with type 2 diabetes for 17 years  She is on oral agents and insulin at home and takes Metformin 1000 mg BID, Levemir insulin 22-24 units at bedtime, and Humalog insulin 4-6 units at breakfast and lunch, 2-3 units in the afternoon, and 5-7 units at supper  She denies any  polydipsia,  and blurry vision  She has no polyphagia  She complains of sporadic hypoglycemic episodes throughout the day  She has 2 times per night nocturia and polyuria  She has no chest pain or shortness of breath  She denies nephropathy, retinopathy, heart attack, stroke and claudication but does admit to neuropathy    Last few months, very poor dietary habits and 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      The patient's last eye exam was in October 2017 and no retinopathy  She complains of some numbness and tingling to her feet at times  She is going to follow up with podiatry in September when her insurance changes  Lab Results   Component Value Date     HGBA1C 7 6 (H) 07/07/2018     Blood Sugar/Glucometer/Pump/CGM review: checks blood sugars at least 3 times a day, extra tests as needed  Recently not as good at testing  No blood sugar record with her today  Before breakfast: 100 or less  Before lunch: no recent testing  Before dinner: varies, 110-160  Bedtime:  depending on what is eaten  Her hypertension is treated with Lasix 40 mg daily  Her most recent urine microalbumin was slightly elevated at 31 9 on July 7, 2018  For her hyperlipidemia, she is taking Omega 3 fatty acid fish oil 1000 mg daily  Her most recent fasting lipid panel from July 7, 2018 reveals an LDL slightly elevated at 104, HDL of 59, triglycerides of 56 and total cholesterol of 174  Review of Systems   Constitutional: Positive for fatigue (at times)  Negative for chills and fever  HENT: Negative  Eyes: Negative  Respiratory: Negative  Negative for chest tightness and shortness of breath  Cardiovascular: Negative  Negative for chest pain and palpitations  Gastrointestinal: Negative  Negative for abdominal pain, constipation, diarrhea and vomiting  Endocrine: Positive for polyuria (at times (lasix))  Negative for cold intolerance, heat intolerance, polydipsia and polyphagia  Genitourinary: Negative  Musculoskeletal: Positive for joint swelling (legs from lymphedema)  Skin: Negative  Allergic/Immunologic: Negative  Neurological: Negative  Negative for dizziness, syncope, light-headedness and headaches  Hematological: Negative  Psychiatric/Behavioral: Negative      All other systems reviewed and are negative        Historical Information   Past Medical History:   Diagnosis Date    Asthma     seasonal    Diabetes mellitus ([address] )     Osteoarthritis     Tremor, hereditary, benign      Past Surgical History:   Procedure Laterality Date     SECTION      3    TOE SURGERY Bilateral     5 th toe bone removed    TOTAL HIP ARTHROPLASTY Right     TUBAL LIGATION Bilateral      Social History   History   Alcohol Use    Yes     Comment: socially     History   Drug Use No     History   Smoking Status    Former Smoker    Packs/day: 1 00    Years: 15 00    Types: Cigarettes    Quit date: 2007   Smokeless Tobacco    Never Used     Family History:   Family History   Problem Relation Age of Onset    Cirrhosis Mother     Heart disease Mother     Esophageal varices Mother     Brain cancer Father     Prostate cancer Father     Stroke Father     Heart attack Father     Skin cancer Brother     COPD Brother     Diabetes type II Maternal Aunt     Diabetes type II Maternal Grandfather     Parkinsonism Maternal Grandfather     Coronary artery disease Brother     Post-traumatic stress disorder Son     No Known Problems Son     No Known Problems Daughter     Parkinsonism Maternal Aunt     Diabetes type I Cousin        Meds/Allergies   Current Outpatient Prescriptions   Medication Sig Dispense Refill    albuterol (PROVENTIL HFA) 90 mcg/act inhaler Inhale      aspirin 81 MG tablet Take 81 mg by mouth 2 (two) times a day      Biotin 5000 MCG CAPS Take 5,000 capsules by mouth 2 (two) times a day      Calcium Carb-Cholecalciferol (CALCIUM 1000 + D PO) Take by mouth      ferrous sulfate 325 (65 Fe) mg tablet Take 325 mg by mouth 2 (two) times a day      fluticasone (FLONASE) 50 mcg/act nasal spray 1 spray into each nostril 2 (two) times a day      folic acid (FOLVITE) 976 mcg tablet Take 400 mcg by mouth 2 (two) times a day      furosemide (LASIX) 40 mg tablet Take by mouth      glucose blood (TRUE METRIX BLOOD GLUCOSE TEST) test strip Use as instructed up to 4 times a day 400 each 3    insulin detemir (LEVEMIR FLEXTOUCH) subcutaneous injection pen 100 units/mL Inject 24 Units under the skin daily at bedtime 10 pen 3    insulin lispro (HumaLOG) 100 Units/mL SOPN Use 2-7 units 4 times a day as directed 20 pen 3    Insulin Pen Needle (BD ULTRA-FINE PEN NEEDLES) 29G X 12 7MM MISC Use up to 5 times a day 500 each 3    metFORMIN (GLUCOPHAGE) 1000 MG tablet Take 1 tablet (1,000 mg total) by mouth 2 (two) times a day with meals 180 tablet 3    Omega-3 Fatty Acids (FISH OIL) 1,000 mg Take 1,000 mg by mouth daily       No current facility-administered medications for this visit  Allergies   Allergen Reactions    Actos [Pioglitazone] Edema     Severe lower extremity    Gentamicin     Oxycodone GI Intolerance    Sulfa Antibiotics        Objective   Vitals: Blood pressure 134/90, pulse 70, height 5' 7 32" (1 71 m), weight 116 kg (255 lb 3 2 oz)  Physical Exam   Constitutional: She is oriented to person, place, and time  She appears well-developed and well-nourished  No distress  HENT:   Head: Normocephalic and atraumatic  Mouth/Throat: Oropharynx is clear and moist    Eyes: Conjunctivae and EOM are normal  Pupils are equal, round, and reactive to light  Right eye exhibits no discharge  Left eye exhibits no discharge  Wears glasses   Neck: Normal range of motion  Neck supple  No JVD present  No tracheal deviation present  No thyromegaly present  Cardiovascular: Normal rate, regular rhythm, normal heart sounds and intact distal pulses  Pulmonary/Chest: Effort normal and breath sounds normal  No stridor  No respiratory distress  She has no wheezes  She has no rales  She exhibits no tenderness  Abdominal: Soft  Bowel sounds are normal  She exhibits no distension  There is no tenderness  Musculoskeletal: Normal range of motion  She exhibits edema (+3 pedal edema (lymphedema))   She exhibits no tenderness or deformity  Lymphadenopathy:     She has no cervical adenopathy  Neurological: She is alert and oriented to person, place, and time  She displays normal reflexes  No cranial nerve deficit  Coordination normal    Skin: Skin is warm and dry  No rash noted  No erythema  No pallor  Psychiatric: She has a normal mood and affect  Her behavior is normal  Judgment and thought content normal    Vitals reviewed  Lab Results:   Lab Results   Component Value Date/Time    Hemoglobin A1C 7 6 (H) 07/07/2018 08:38 AM    Hemoglobin A1C 8 3 (H) 03/05/2018 08:56 AM    Hemoglobin A1C 8 3 (H) 09/27/2017 11:41 AM    BUN 12 07/07/2018 08:38 AM    BUN 14 03/05/2018 08:56 AM    BUN 10 09/27/2017 11:41 AM    Sodium 137 07/07/2018 08:38 AM    Sodium 136 03/05/2018 08:56 AM    Sodium 141 09/27/2017 11:41 AM    Potassium 4 1 07/07/2018 08:38 AM    Potassium 4 4 03/05/2018 08:56 AM    Potassium 4 2 09/27/2017 11:41 AM    Chloride 102 07/07/2018 08:38 AM    Chloride 101 03/05/2018 08:56 AM    Chloride 107 09/27/2017 11:41 AM    CO2 32 07/07/2018 08:38 AM    CO2 31 03/05/2018 08:56 AM    CO2 28 09/27/2017 11:41 AM    Creatinine 0 70 07/07/2018 08:38 AM    Creatinine 0 95 03/05/2018 08:56 AM    Creatinine 0 76 09/27/2017 11:41 AM    AST 14 07/07/2018 08:38 AM    AST 16 03/05/2018 08:56 AM    AST 17 09/27/2017 11:41 AM    ALT 17 07/07/2018 08:38 AM    ALT 13 03/05/2018 08:56 AM    ALT 14 09/27/2017 11:41 AM    Albumin 3 6 07/07/2018 08:38 AM    Albumin 3 6 03/05/2018 08:56 AM    Albumin 3 5 09/27/2017 11:41 AM    Cholesterol 174 07/07/2018 08:38 AM    HDL, Direct 59 07/07/2018 08:38 AM    Triglycerides 56 07/07/2018 08:38 AM     Portions of the record may have been created with voice recognition software  Occasional wrong word or "sound a like" substitutions may have occurred due to the inherent limitations of voice recognition software   Read the chart carefully and recognize, using context, where substitutions have occurred

## 2018-07-11 NOTE — PATIENT INSTRUCTIONS
Be mindful of diet  Stay active and stay hydrated  Please check her blood sugars 3-4 times daily and for the record to the office in 1-2 weeks for review and further adjustment, as needed  For now, continue current regimen  Please take your Humalog before your meals and take them consistently  For your blood pressure and microalbuminuria, please start lisinopril 2 5 mg daily  Contact the office with any problems  Continue Omega 3 fatty acid fish oil daily

## 2018-07-13 DIAGNOSIS — Z79.4 TYPE 2 DIABETES MELLITUS WITH DIABETIC NEUROPATHY, WITH LONG-TERM CURRENT USE OF INSULIN (HCC): ICD-10-CM

## 2018-07-13 DIAGNOSIS — E11.40 TYPE 2 DIABETES MELLITUS WITH DIABETIC NEUROPATHY, WITH LONG-TERM CURRENT USE OF INSULIN (HCC): ICD-10-CM

## 2018-10-12 ENCOUNTER — LAB (OUTPATIENT)
Dept: LAB | Facility: CLINIC | Age: 63
End: 2018-10-12
Payer: MEDICARE

## 2018-10-12 DIAGNOSIS — E11.40 TYPE 2 DIABETES MELLITUS WITH DIABETIC NEUROPATHY, WITH LONG-TERM CURRENT USE OF INSULIN (HCC): ICD-10-CM

## 2018-10-12 DIAGNOSIS — Z79.4 TYPE 2 DIABETES MELLITUS WITH DIABETIC NEUROPATHY, WITH LONG-TERM CURRENT USE OF INSULIN (HCC): ICD-10-CM

## 2018-10-12 DIAGNOSIS — I10 ESSENTIAL HYPERTENSION: ICD-10-CM

## 2018-10-12 LAB
ALBUMIN SERPL BCP-MCNC: 3.7 G/DL (ref 3.5–5)
ALP SERPL-CCNC: 65 U/L (ref 46–116)
ALT SERPL W P-5'-P-CCNC: 18 U/L (ref 12–78)
ANION GAP SERPL CALCULATED.3IONS-SCNC: 4 MMOL/L (ref 4–13)
AST SERPL W P-5'-P-CCNC: 10 U/L (ref 5–45)
BILIRUB SERPL-MCNC: 0.61 MG/DL (ref 0.2–1)
BUN SERPL-MCNC: 14 MG/DL (ref 5–25)
CALCIUM SERPL-MCNC: 9.4 MG/DL (ref 8.3–10.1)
CHLORIDE SERPL-SCNC: 103 MMOL/L (ref 100–108)
CO2 SERPL-SCNC: 28 MMOL/L (ref 21–32)
CREAT SERPL-MCNC: 0.88 MG/DL (ref 0.6–1.3)
EST. AVERAGE GLUCOSE BLD GHB EST-MCNC: 151 MG/DL
GFR SERPL CREATININE-BSD FRML MDRD: 69 ML/MIN/1.73SQ M
GLUCOSE P FAST SERPL-MCNC: 75 MG/DL (ref 65–99)
HBA1C MFR BLD: 6.9 % (ref 4.2–6.3)
POTASSIUM SERPL-SCNC: 4.2 MMOL/L (ref 3.5–5.3)
PROT SERPL-MCNC: 7.1 G/DL (ref 6.4–8.2)
SODIUM SERPL-SCNC: 135 MMOL/L (ref 136–145)

## 2018-10-12 PROCEDURE — 80053 COMPREHEN METABOLIC PANEL: CPT

## 2018-10-12 PROCEDURE — 83036 HEMOGLOBIN GLYCOSYLATED A1C: CPT

## 2018-10-12 PROCEDURE — 36415 COLL VENOUS BLD VENIPUNCTURE: CPT

## 2018-10-16 ENCOUNTER — OFFICE VISIT (OUTPATIENT)
Dept: ENDOCRINOLOGY | Facility: HOSPITAL | Age: 63
End: 2018-10-16
Payer: MEDICARE

## 2018-10-16 VITALS
DIASTOLIC BLOOD PRESSURE: 84 MMHG | HEIGHT: 67 IN | BODY MASS INDEX: 40.02 KG/M2 | WEIGHT: 255 LBS | SYSTOLIC BLOOD PRESSURE: 120 MMHG | HEART RATE: 92 BPM

## 2018-10-16 DIAGNOSIS — E11.40 TYPE 2 DIABETES MELLITUS WITH DIABETIC NEUROPATHY, WITH LONG-TERM CURRENT USE OF INSULIN (HCC): Primary | ICD-10-CM

## 2018-10-16 DIAGNOSIS — E78.5 HYPERLIPIDEMIA, UNSPECIFIED HYPERLIPIDEMIA TYPE: ICD-10-CM

## 2018-10-16 DIAGNOSIS — R80.9 MICROALBUMINURIA: ICD-10-CM

## 2018-10-16 DIAGNOSIS — Z79.4 TYPE 2 DIABETES MELLITUS WITH DIABETIC NEUROPATHY, WITH LONG-TERM CURRENT USE OF INSULIN (HCC): Primary | ICD-10-CM

## 2018-10-16 DIAGNOSIS — I10 ESSENTIAL HYPERTENSION: ICD-10-CM

## 2018-10-16 PROCEDURE — 99214 OFFICE O/P EST MOD 30 MIN: CPT | Performed by: NURSE PRACTITIONER

## 2018-10-16 RX ORDER — LISINOPRIL 2.5 MG/1
2.5 TABLET ORAL DAILY
Qty: 90 TABLET | Refills: 2 | Status: SHIPPED | OUTPATIENT
Start: 2018-10-16 | End: 2019-07-17 | Stop reason: SDUPTHER

## 2018-10-16 NOTE — PATIENT INSTRUCTIONS
Be mindful of diet  Stay active and stay hydrated  Your blood sugars have been consistently low in the morning  Please decrease Levemir to 20-22 units at bedtime  Please check your blood sugars 3-4 times daily and forward a record to the office in 1-2 weeks for review and further adjustment, as needed  Please take your Humalog before your meals and take them consistently  Continue Lasix and lisinopril for treatment of your blood pressure/urine microalbumin  Continue Omega 3 fatty acid fish oil daily  Obtain lab work as prescribed

## 2018-10-16 NOTE — PROGRESS NOTES
Jericho Linares 70 y o  female MRN: [id number]    Encounter: [id number]      Assessment/Plan     Assessment: This is a 70y o -year-old female with type 2 diabetes with neuropathy, hypertension and hyperlipidemia  Plan:  1   Type 2 diabetes insulin requiring:  Her most recent hemoglobin A1c has improved to 6 9  She appears to be having lower blood sugars in the morning recently  I have asked her to decrease her Levemir dose to 20-22 units  Discussed proper process for taking Humalog before meals  No changes were made to her regimen at this time  She will inject her insulin before her meals and continue to check her blood sugars 3-4 times daily  I have asked her to for the record of her blood sugars to the office in 1-2 weeks for review so further changes can be made to help her blood sugars throughout the day  Check hemoglobin A1c prior to next visit      2  Hypertension/microalbuminuria:  Her blood pressure is slightly elevated in the office today and her most recent urine microalbumin was also slightly elevated  I have asked her to start lisinopril 2 5 mg daily  Check comprehensive metabolic panel and urine microalbumin prior to next visit      3  Hyperlipidemia:  Continue Omega 3 fatty acid fish oil  CC:  Type 2 Diabetes follow-up    History of Present Illness     HPI:  57 y  o  year old female with type 2 diabetes for 17 years   She is on oral agents and insulin at home and takes Metformin 1000 mg BID, Levemir insulin 22-24 units at bedtime, and Humalog insulin 4-6 units at breakfast and lunch, 2-3 units in the afternoon, and 5-7 units at supper  Her most recent hemoglobin A1c from October 12, 2018 is 6 9  Her recent blood sugars have been lower in the morning consistently  She denies any polydipsia, and blurry vision   She has no polyphagia  She has 2 times per night nocturia and polyuria  She has no chest pain or shortness of breath   She denies nephropathy, retinopathy, heart attack, stroke and claudication but does admit to neuropathy      The patient's last eye exam was in October 2017 and no retinopathy  She is scheduled for November 2018 for a reassessment eye exam   She complains of some numbness and tingling to her feet at times  She is going to follow up with podiatry in now that she is on Medicare  Her hypertension is treated with Lasix 40 mg daily and lisinopril 2 5 mg daily        For her hyperlipidemia, she is taking Omega 3 fatty acid fish oil 1000 mg daily  Her most recent fasting lipid panel from July 7, 2018 reveals an LDL slightly elevated at 104, HDL of 59, triglycerides of 56 and total cholesterol of 174  Review of Systems   Constitutional: Negative  Negative for chills, fatigue and fever  HENT: Negative  Negative for trouble swallowing and voice change  Eyes: Negative  Respiratory: Negative  Negative for chest tightness and shortness of breath  Cardiovascular: Negative  Negative for chest pain and palpitations  Gastrointestinal: Negative  Negative for abdominal pain, constipation, diarrhea and vomiting  Endocrine: Positive for polyuria (at times-attributed to Lasix)  Negative for cold intolerance, heat intolerance, polydipsia and polyphagia  Genitourinary: Negative  Musculoskeletal: Positive for arthralgias (arthritis) and joint swelling (arthritis)  Skin: Negative  Allergic/Immunologic: Negative  Neurological: Positive for numbness (in feet at times)  Negative for dizziness, syncope, light-headedness and headaches  Hematological: Negative  Psychiatric/Behavioral: Negative  All other systems reviewed and are negative  Patient's shoes and socks removed  Right Foot/Ankle   Right Foot Inspection  Skin Exam: skin normal and skin intact no dry skin, no warmth, no callus, no erythema, no maceration, no abnormal color, no pre-ulcer, no ulcer and no callus                          Toe Exam: ROM and strength within normal limits  Sensory       Monofilament testing: intact  Vascular  Capillary refills: < 3 seconds  The right DP pulse is 1+  Left Foot/Ankle  Left Foot Inspection  Skin Exam: skin normal and skin intactno dry skin, no warmth, no erythema, no maceration, normal color, no pre-ulcer, no ulcer and no callus                         Toe Exam: ROM and strength within normal limits                   Sensory       Monofilament: intact  Vascular  Capillary refills: < 3 seconds  The left DP pulse is 1+  Assign Risk Category:  No deformity present; No loss of protective sensation;  No weak pulses       Risk: 0      Historical Information   Past Medical History:   Diagnosis Date    Asthma     seasonal    Diabetes mellitus ([facility] [address] )     Osteoarthritis     Tremor, hereditary, benign      Past Surgical History:   Procedure Laterality Date     SECTION      3    TOE SURGERY Bilateral     5 th toe bone removed    TOTAL HIP ARTHROPLASTY Right     TUBAL LIGATION Bilateral      Social History   History   Alcohol Use    Yes     Comment: socially     History   Drug Use No     History   Smoking Status    Former Smoker    Packs/day: 1 00    Years: 15 00    Types: Cigarettes    Quit date: 2007   Smokeless Tobacco    Never Used     Family History:   Family History   Problem Relation Age of Onset    Cirrhosis Mother     Heart disease Mother     Esophageal varices Mother     Brain cancer Father     Prostate cancer Father     Stroke Father     Heart attack Father     Skin cancer Brother     COPD Brother     Diabetes type II Maternal Aunt     Diabetes type II Maternal Grandfather     Parkinsonism Maternal Grandfather     Coronary artery disease Brother     Post-traumatic stress disorder Son     No Known Problems Son     No Known Problems Daughter     Parkinsonism Maternal Aunt     Diabetes type I Cousin        Meds/Allergies   Current Outpatient Prescriptions   Medication Sig Dispense Refill    albuterol (PROVENTIL HFA) 90 mcg/act inhaler Inhale      aspirin 81 MG tablet Take 81 mg by mouth 2 (two) times a day      Biotin 5000 MCG CAPS Take 5,000 capsules by mouth 2 (two) times a day      Calcium Carb-Cholecalciferol (CALCIUM 1000 + D PO) Take by mouth      ferrous sulfate 325 (65 Fe) mg tablet Take 325 mg by mouth 2 (two) times a day      fluticasone (FLONASE) 50 mcg/act nasal spray 1 spray into each nostril 2 (two) times a day      folic acid (FOLVITE) 493 mcg tablet Take 400 mcg by mouth 2 (two) times a day      furosemide (LASIX) 40 mg tablet Take by mouth      glucose blood (TRUE METRIX BLOOD GLUCOSE TEST) test strip Use as instructed up to 4 times a day 400 each 0    insulin detemir (LEVEMIR FLEXTOUCH) 100 Units/mL injection pen Inject 24 Units under the skin daily at bedtime 10 pen 0    insulin lispro (HumaLOG) 100 units/mL injection pen Use 2-7 units 4 times a day as directed 20 pen 0    Insulin Pen Needle (BD ULTRA-FINE PEN NEEDLES) 29G X 12 7MM MISC Use up to 5 times a day 500 each 0    lisinopril (ZESTRIL) 2 5 mg tablet Take 1 tablet (2 5 mg total) by mouth daily 90 tablet 1    metFORMIN (GLUCOPHAGE) 1000 MG tablet Take 1 tablet (1,000 mg total) by mouth 2 (two) times a day with meals 180 tablet 0    Omega-3 Fatty Acids (FISH OIL) 1,000 mg Take 1,000 mg by mouth daily       No current facility-administered medications for this visit  Allergies   Allergen Reactions    Actos [Pioglitazone] Edema     Severe lower extremity    Gentamicin     Oxycodone GI Intolerance    Sulfa Antibiotics        Objective   Vitals: Blood pressure 120/84, pulse 92, height 5' 7 32" (1 71 m), weight 116 kg (255 lb)  Physical Exam   Constitutional: She is oriented to person, place, and time  She appears well-developed and well-nourished  No distress  Obese   HENT:   Head: Normocephalic and atraumatic  Mouth/Throat: Oropharynx is clear and moist    Eyes: Pupils are equal, round, and reactive to light  Conjunctivae and EOM are normal  Right eye exhibits no discharge  Left eye exhibits no discharge  No scleral icterus  Wears glasses   Neck: Normal range of motion  Neck supple  No JVD present  No tracheal deviation present  No thyromegaly present  Cardiovascular: Normal rate, regular rhythm, normal heart sounds and intact distal pulses  Exam reveals no gallop and no friction rub  Pulses are no weak pulses  No murmur heard  Pulses:       Dorsalis pedis pulses are 1+ on the right side, and 1+ on the left side  Pulmonary/Chest: Effort normal and breath sounds normal  No stridor  No respiratory distress  She has no wheezes  She has no rales  She exhibits no tenderness  Abdominal: Soft  Bowel sounds are normal  She exhibits no distension  There is no tenderness  Musculoskeletal: Normal range of motion  She exhibits edema (3 lymphedema)  She exhibits no tenderness or deformity  Feet:   Right Foot:   Skin Integrity: Negative for ulcer, skin breakdown, erythema, warmth, callus or dry skin  Left Foot:   Skin Integrity: Negative for ulcer, skin breakdown, erythema, warmth, callus or dry skin  Lymphadenopathy:     She has no cervical adenopathy  Neurological: She is alert and oriented to person, place, and time  She displays normal reflexes  No cranial nerve deficit  Coordination normal    Skin: Skin is warm and dry  No rash noted  No erythema  No pallor  Dry skin   Psychiatric: She has a normal mood and affect  Her behavior is normal  Judgment and thought content normal    Vitals reviewed       Lab Results:   Lab Results   Component Value Date/Time    Hemoglobin A1C 6 9 (H) 10/12/2018 08:23 AM    Hemoglobin A1C 7 6 (H) 07/07/2018 08:38 AM    Hemoglobin A1C 8 3 (H) 03/05/2018 08:56 AM    BUN 14 10/12/2018 08:23 AM    BUN 12 07/07/2018 08:38 AM    BUN 14 03/05/2018 08:56 AM    Sodium 135 (L) 10/12/2018 08:23 AM    Sodium 137 07/07/2018 08:38 AM    Sodium 136 03/05/2018 08:56 AM    Potassium 4 2 10/12/2018 08:23 AM    Potassium 4 1 07/07/2018 08:38 AM    Potassium 4 4 03/05/2018 08:56 AM    Chloride 103 10/12/2018 08:23 AM    Chloride 102 07/07/2018 08:38 AM    Chloride 101 03/05/2018 08:56 AM    CO2 28 10/12/2018 08:23 AM    CO2 32 07/07/2018 08:38 AM    CO2 31 03/05/2018 08:56 AM    Creatinine 0 88 10/12/2018 08:23 AM    Creatinine 0 70 07/07/2018 08:38 AM    Creatinine 0 95 03/05/2018 08:56 AM    AST 10 10/12/2018 08:23 AM    AST 14 07/07/2018 08:38 AM    AST 16 03/05/2018 08:56 AM    ALT 18 10/12/2018 08:23 AM    ALT 17 07/07/2018 08:38 AM    ALT 13 03/05/2018 08:56 AM    Albumin 3 7 10/12/2018 08:23 AM    Albumin 3 6 07/07/2018 08:38 AM    Albumin 3 6 03/05/2018 08:56 AM    HDL, Direct 59 07/07/2018 08:38 AM    Triglycerides 56 07/07/2018 08:38 AM     Portions of the record may have been created with voice recognition software  Occasional wrong word or "sound a like" substitutions may have occurred due to the inherent limitations of voice recognition software  Read the chart carefully and recognize, using context, where substitutions have occurred

## 2018-10-17 DIAGNOSIS — E11.40 TYPE 2 DIABETES MELLITUS WITH DIABETIC NEUROPATHY, WITH LONG-TERM CURRENT USE OF INSULIN (HCC): ICD-10-CM

## 2018-10-17 DIAGNOSIS — Z79.4 TYPE 2 DIABETES MELLITUS WITH DIABETIC NEUROPATHY, WITH LONG-TERM CURRENT USE OF INSULIN (HCC): ICD-10-CM

## 2018-10-19 DIAGNOSIS — Z79.4 TYPE 2 DIABETES MELLITUS WITH DIABETIC NEUROPATHY, WITH LONG-TERM CURRENT USE OF INSULIN (HCC): ICD-10-CM

## 2018-10-19 DIAGNOSIS — E11.40 TYPE 2 DIABETES MELLITUS WITH DIABETIC NEUROPATHY, WITH LONG-TERM CURRENT USE OF INSULIN (HCC): ICD-10-CM

## 2018-10-19 RX ORDER — CALCIUM CITRATE/VITAMIN D3 200MG-6.25
TABLET ORAL
Qty: 400 EACH | Refills: 3 | Status: SHIPPED | OUTPATIENT
Start: 2018-10-19

## 2018-10-19 RX ORDER — PEN NEEDLE, DIABETIC 29 G X1/2"
NEEDLE, DISPOSABLE MISCELLANEOUS
Qty: 500 EACH | Refills: 3 | Status: SHIPPED | OUTPATIENT
Start: 2018-10-19 | End: 2020-02-25

## 2018-10-19 NOTE — TELEPHONE ENCOUNTER
Pt needs refill of her Test Strips, Pen Needles, and Metformin please  Dave printed scripts for pt so they didn't get sent to mail order

## 2018-11-01 LAB
LEFT EYE DIABETIC RETINOPATHY: NORMAL
RIGHT EYE DIABETIC RETINOPATHY: NORMAL
SEVERITY (EYE EXAM): NORMAL

## 2019-01-31 ENCOUNTER — LAB (OUTPATIENT)
Dept: LAB | Facility: CLINIC | Age: 64
End: 2019-01-31
Payer: MEDICARE

## 2019-01-31 DIAGNOSIS — Z79.4 TYPE 2 DIABETES MELLITUS WITH DIABETIC NEUROPATHY, WITH LONG-TERM CURRENT USE OF INSULIN (HCC): ICD-10-CM

## 2019-01-31 DIAGNOSIS — I10 ESSENTIAL HYPERTENSION: ICD-10-CM

## 2019-01-31 DIAGNOSIS — E11.40 TYPE 2 DIABETES MELLITUS WITH DIABETIC NEUROPATHY, WITH LONG-TERM CURRENT USE OF INSULIN (HCC): ICD-10-CM

## 2019-01-31 LAB
ALBUMIN SERPL BCP-MCNC: 3.8 G/DL (ref 3.5–5)
ALP SERPL-CCNC: 59 U/L (ref 46–116)
ALT SERPL W P-5'-P-CCNC: 20 U/L (ref 12–78)
ANION GAP SERPL CALCULATED.3IONS-SCNC: 6 MMOL/L (ref 4–13)
AST SERPL W P-5'-P-CCNC: 18 U/L (ref 5–45)
BILIRUB SERPL-MCNC: 0.49 MG/DL (ref 0.2–1)
BUN SERPL-MCNC: 13 MG/DL (ref 5–25)
CALCIUM SERPL-MCNC: 8.9 MG/DL (ref 8.3–10.1)
CHLORIDE SERPL-SCNC: 105 MMOL/L (ref 100–108)
CO2 SERPL-SCNC: 30 MMOL/L (ref 21–32)
CREAT SERPL-MCNC: 0.88 MG/DL (ref 0.6–1.3)
EST. AVERAGE GLUCOSE BLD GHB EST-MCNC: 157 MG/DL
GFR SERPL CREATININE-BSD FRML MDRD: 69 ML/MIN/1.73SQ M
GLUCOSE P FAST SERPL-MCNC: 74 MG/DL (ref 65–99)
HBA1C MFR BLD: 7.1 % (ref 4.2–6.3)
POTASSIUM SERPL-SCNC: 4.3 MMOL/L (ref 3.5–5.3)
PROT SERPL-MCNC: 6.9 G/DL (ref 6.4–8.2)
SODIUM SERPL-SCNC: 141 MMOL/L (ref 136–145)

## 2019-01-31 PROCEDURE — 83036 HEMOGLOBIN GLYCOSYLATED A1C: CPT

## 2019-01-31 PROCEDURE — 80053 COMPREHEN METABOLIC PANEL: CPT

## 2019-01-31 PROCEDURE — 36415 COLL VENOUS BLD VENIPUNCTURE: CPT

## 2019-02-21 ENCOUNTER — OFFICE VISIT (OUTPATIENT)
Dept: ENDOCRINOLOGY | Facility: HOSPITAL | Age: 64
End: 2019-02-21
Payer: MEDICARE

## 2019-02-21 VITALS
BODY MASS INDEX: 40.62 KG/M2 | WEIGHT: 258.8 LBS | HEART RATE: 90 BPM | DIASTOLIC BLOOD PRESSURE: 84 MMHG | SYSTOLIC BLOOD PRESSURE: 134 MMHG | HEIGHT: 67 IN

## 2019-02-21 DIAGNOSIS — E78.2 MIXED HYPERLIPIDEMIA: ICD-10-CM

## 2019-02-21 DIAGNOSIS — E11.40 TYPE 2 DIABETES MELLITUS WITH DIABETIC NEUROPATHY, WITH LONG-TERM CURRENT USE OF INSULIN (HCC): Primary | ICD-10-CM

## 2019-02-21 DIAGNOSIS — Z79.4 TYPE 2 DIABETES MELLITUS WITH DIABETIC NEUROPATHY, WITH LONG-TERM CURRENT USE OF INSULIN (HCC): Primary | ICD-10-CM

## 2019-02-21 PROCEDURE — 99214 OFFICE O/P EST MOD 30 MIN: CPT | Performed by: INTERNAL MEDICINE

## 2019-02-21 RX ORDER — VITAMIN E 268 MG
400 CAPSULE ORAL DAILY
COMMUNITY

## 2019-02-21 RX ORDER — VITAMIN B COMPLEX
1 CAPSULE ORAL DAILY
COMMUNITY
End: 2021-11-23 | Stop reason: ALTCHOICE

## 2019-02-21 RX ORDER — MULTIVIT WITH MINERALS/LUTEIN
1000 TABLET ORAL DAILY
COMMUNITY
End: 2020-04-16 | Stop reason: ALTCHOICE

## 2019-02-21 RX ORDER — DOCUSATE SODIUM 100 MG/1
100 CAPSULE, LIQUID FILLED ORAL DAILY
COMMUNITY

## 2019-02-21 NOTE — LETTER
February 21, 2019     Velia Matias DO  [address] [facility]    [facility] [place] [address]    Patient: Nicolette Fontanez   YOB: 1955   Date of Visit: 2/21/2019       Dear Dr Johnson Roque: Thank you for referring Carolina Butts to me for evaluation  Below are my notes for this consultation  If you have questions, please do not hesitate to call me  I look forward to following your patient along with you  Sincerely,        Yahaira Barcenas MD        CC: No Recipients  Yahaira Barcenas MD  2/21/2019  5:09 PM  Sign at close encounter  2/21/2019    Assessment/Plan      {[email or web address]:[id number]}    Assessment/Plan:  1  Type 2 diabetes insulin requiring  Her most recent hemoglobin A1c is a bit higher but still relatively stable at 7 1%  She has mostly eliminated most of her hypoglycemia at this point  For now, she will continue the same Levemir insulin and NovoLog insulin along with metformin  She will continue to check her blood sugars up to 4 times a day  2  Diabetic neuropathy  She has stable neuropathic symptoms  Her foot exam is followed over time  3  Hyperlipidemia  Her most recent lipid profile was excellent with just an LDL of 104 on no medications  I have asked her to follow up in 3 months with preceding CMP, CBC, and hemoglobin A1c       CC: Diabetes type 2 and lipid follow-up    History of Present Illness     HPI: Nicolette Fontanez is a 70y o  year old female with type 2 diabetes for 18 years  She is on oral agents and insulin at home and takes Levemir insulin 20-22 units at bedtime, NovoLog insulin 4-6 units at breakfast and lunch, 2-4 units in the afternoon, 5-7 units at supper, and metformin 1000 mg twice a day  She denies any polyuria, polydipsia, polyphagia, and blurry vision  She has twice a night nocturia  She has chronic numbness and tingling of her feet unchanged  She denies chest pain or shortness of breath    She denies nephropathy, retinopathy, heart attack, stroke and claudication but does admit to neuropathy  Hypoglycemic episodes: Yes occasional     The patient's last eye exam was in november 2018 with no retinopathy  The patient's last foot exam was in feb 2019 and told her she has arthritis and neuropathy in the feet  Last A1C was done on 01/31/2019 was  Lab Results   Component Value Date    HGBA1C 7 1 (H) 01/31/2019     Blood Sugar/Glucometer/Pump/CGM review: checks blood sugars 1-2 times a day while busy, normally 3-4 times a day  Before breakfast: , fewer low blood sugars in am  Before lunch:   Before dinner: more variable with afternoon snack, can be high if forgets afternoon insulin  Bedtime:     Review of Systems   Constitutional: Negative for fatigue and unexpected weight change  3 lb more than last visit  HENT: Negative for trouble swallowing  Eyes: Negative for visual disturbance  Wears glasses  Respiratory: Negative for chest tightness and shortness of breath  Cardiovascular: Positive for leg swelling  Negative for chest pain and palpitations  Lymphedema is unchanged  Had EKG per PCP recently which was normal    Gastrointestinal: Negative for abdominal pain, constipation, diarrhea and nausea  Endocrine: Negative for polydipsia, polyphagia and polyuria  Polyuria after lasix  Will have polyuria with increased water consumption  Nocturia twice a night  Musculoskeletal: Positive for arthralgias  Negative for back pain  Bilateral knee pain, to see ortho again soon for injections  Skin: Negative for wound  Neurological: Positive for numbness  Has numbness and tingling of the feet  Psychiatric/Behavioral: Positive for sleep disturbance  Negative for dysphoric mood  The patient is not nervous/anxious  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  Has some difficulty getting to sleep         Historical Information   Past Medical History:   Diagnosis Date    Asthma seasonal    Diabetes mellitus ([facility] [address] )     Osteoarthritis     Tremor, hereditary, benign      Past Surgical History:   Procedure Laterality Date     SECTION      3    TOE SURGERY Bilateral     5 th toe bone removed    TOTAL HIP ARTHROPLASTY Right     TUBAL LIGATION Bilateral      Social History   Social History     Substance and Sexual Activity   Alcohol Use Yes    Comment: socially     Social History     Substance and Sexual Activity   Drug Use No     Social History     Tobacco Use   Smoking Status Former Smoker    Packs/day: 1 00    Years: 15 00    Pack years: 15 00    Types: Cigarettes    Last attempt to quit: 2007    Years since quittin 7   Smokeless Tobacco Never Used     Family History:   Family History   Problem Relation Age of Onset    Cirrhosis Mother     Heart disease Mother     Esophageal varices Mother     Brain cancer Father     Prostate cancer Father     Stroke Father     Heart attack Father     Skin cancer Brother     COPD Brother     Diabetes type II Maternal Aunt     Diabetes type II Maternal Grandfather     Parkinsonism Maternal Grandfather     Coronary artery disease Brother     Post-traumatic stress disorder Son     No Known Problems Son     No Known Problems Daughter     Parkinsonism Maternal Aunt     Diabetes type I Cousin        Meds/Allergies   Current Outpatient Medications   Medication Sig Dispense Refill    BD ULTRA-FINE PEN NEEDLES 29G X 12 7MM MISC Use up to 5 times a day 500 each 3    TRUE METRIX BLOOD GLUCOSE TEST test strip Use as instructed up to 4 times a day 400 each 3    albuterol (PROVENTIL HFA) 90 mcg/act inhaler Inhale      aspirin 81 MG tablet Take 81 mg by mouth 2 (two) times a day      Biotin 5000 MCG CAPS Take 5,000 capsules by mouth 2 (two) times a day      Calcium Carb-Cholecalciferol (CALCIUM 1000 + D PO) Take by mouth      ferrous sulfate 325 (65 Fe) mg tablet Take 325 mg by mouth 2 (two) times a day      fluticasone (FLONASE) 50 mcg/act nasal spray 1 spray into each nostril 2 (two) times a day      folic acid (FOLVITE) 628 mcg tablet Take 400 mcg by mouth 2 (two) times a day      furosemide (LASIX) 40 mg tablet Take by mouth      insulin detemir (LEVEMIR FLEXTOUCH) 100 Units/mL injection pen Inject 24 Units under the skin daily at bedtime 10 pen 0    insulin lispro (HumaLOG) 100 units/mL injection pen Use 2-7 units 4 times a day as directed 20 pen 2    lisinopril (ZESTRIL) 2 5 mg tablet Take 1 tablet (2 5 mg total) by mouth daily 90 tablet 2    metFORMIN (GLUCOPHAGE) 1000 MG tablet Take 1 tablet (1,000 mg total) by mouth 2 (two) times a day with meals 180 tablet 1    Omega-3 Fatty Acids (FISH OIL) 1,000 mg Take 1,000 mg by mouth daily       No current facility-administered medications for this visit  Allergies   Allergen Reactions    Actos [Pioglitazone] Edema     Severe lower extremity    Gentamicin     Oxycodone GI Intolerance    Sulfa Antibiotics        Objective   Vitals: Height 5' 7 32" (1 71 m), weight 117 kg (258 lb 12 8 oz)  Invasive Devices          None          Physical Exam   Constitutional: She is oriented to person, place, and time  She appears well-developed and well-nourished  HENT:   Head: Normocephalic and atraumatic  Eyes: Pupils are equal, round, and reactive to light  Conjunctivae and EOM are normal    Neck: Normal range of motion  Neck supple  No thyromegaly present  No carotid bruits  Cardiovascular: Normal rate, regular rhythm, normal heart sounds and intact distal pulses  No murmur heard  Pulmonary/Chest: Effort normal and breath sounds normal  She has no wheezes  Musculoskeletal: Normal range of motion  She exhibits no edema or deformity  Bilateral lymphedema  Bunions bilateral 1st MP joint  Lymphadenopathy:     She has no cervical adenopathy  Neurological: She is alert and oriented to person, place, and time  She has normal reflexes  Skin: Skin is warm and dry   No rash noted  Vitals reviewed  The history was obtained from the review of the chart and from the patient  Lab Results:    Most recent Alc is  Lab Results   Component Value Date    HGBA1C 7 1 (H) 01/31/2019         CMP done on 01/31/2019 demonstrates a fasting glucose of 74 but was otherwise normal     Lab Results   Component Value Date    CREATININE 0 88 01/31/2019    CREATININE 0 88 10/12/2018    CREATININE 0 70 07/07/2018    BUN 13 01/31/2019     11/10/2015    K 4 3 01/31/2019     01/31/2019    CO2 30 01/31/2019     eGFR   Date Value Ref Range Status   01/31/2019 69 ml/min/1 73sq m Final       Lab Results   Component Value Date    ALT 20 01/31/2019    AST 18 01/31/2019    ALKPHOS 59 01/31/2019    BILITOT 0 42 11/10/2015       No future appointments

## 2019-02-21 NOTE — PATIENT INSTRUCTIONS
hgba1c was 7 1%  This is good  No change in the insulin or metformin  Continue to check blood sugars 4 times a day  Follow up in 3 months with blood work

## 2019-02-21 NOTE — PROGRESS NOTES
2/21/2019    Assessment/Plan      Diagnoses and all orders for this visit:    Type 2 diabetes mellitus with diabetic neuropathy, with long-term current use of insulin ([facility] [address] )  -     HEMOGLOBIN A1C W/ EAG ESTIMATION Lab Collect; Future  -     Comprehensive metabolic panel Lab Collect; Future  -     CBC and differential Lab Collect; Future    Mixed hyperlipidemia  -     HEMOGLOBIN A1C W/ EAG ESTIMATION Lab Collect; Future  -     Comprehensive metabolic panel Lab Collect; Future  -     CBC and differential Lab Collect; Future    Other orders  -     vitamin E, tocopherol, 400 units capsule; Take 400 Units by mouth daily  -     docusate sodium (COLACE) 100 mg capsule; Take 100 mg by mouth daily   -     Ascorbic Acid (VITAMIN C) 1000 MG tablet; Take 1,000 mg by mouth daily  -     b complex vitamins capsule; Take 1 capsule by mouth daily        Assessment/Plan:  1  Type 2 diabetes insulin requiring  Her most recent hemoglobin A1c is a bit higher but still relatively stable at 7 1%  She has mostly eliminated most of her hypoglycemia at this point  For now, she will continue the same Levemir insulin and NovoLog insulin along with metformin  She will continue to check her blood sugars up to 4 times a day  2  Diabetic neuropathy  She has stable neuropathic symptoms  Her foot exam is followed over time  3  Hyperlipidemia  Her most recent lipid profile was excellent with just an LDL of 104 on no medications  I have asked her to follow up in 3 months with preceding CMP, CBC, and hemoglobin A1c       CC: Diabetes type 2 and lipid follow-up    History of Present Illness     HPI: Dany Trotter is a 70y o  year old female with type 2 diabetes for 18 years  She is on oral agents and insulin at home and takes Levemir insulin 20-22 units at bedtime, NovoLog insulin 4-6 units at breakfast and lunch, 2-4 units in the afternoon, 5-7 units at supper, and metformin 1000 mg twice a day   She denies any polyuria, polydipsia, polyphagia, and blurry vision  She has twice a night nocturia  She has chronic numbness and tingling of her feet unchanged  She denies chest pain or shortness of breath  She denies nephropathy, retinopathy, heart attack, stroke and claudication but does admit to neuropathy  Hypoglycemic episodes: Yes occasional     The patient's last eye exam was in november 2018 with no retinopathy  The patient's last foot exam was in feb 2019 and told her she has arthritis and neuropathy in the feet  Last A1C was done on 01/31/2019 was  Lab Results   Component Value Date    HGBA1C 7 1 (H) 01/31/2019     Blood Sugar/Glucometer/Pump/CGM review: checks blood sugars 1-2 times a day while busy, normally 3-4 times a day  Before breakfast: , fewer low blood sugars in am  Before lunch:   Before dinner: more variable with afternoon snack, can be high if forgets afternoon insulin  Bedtime:     Review of Systems   Constitutional: Negative for fatigue and unexpected weight change  3 lb more than last visit  HENT: Negative for trouble swallowing  Eyes: Negative for visual disturbance  Wears glasses  Respiratory: Negative for chest tightness and shortness of breath  Cardiovascular: Positive for leg swelling  Negative for chest pain and palpitations  Lymphedema is unchanged  Had EKG per PCP recently which was normal    Gastrointestinal: Negative for abdominal pain, constipation, diarrhea and nausea  Endocrine: Negative for polydipsia, polyphagia and polyuria  Polyuria after lasix  Will have polyuria with increased water consumption  Nocturia twice a night  Musculoskeletal: Positive for arthralgias  Negative for back pain  Bilateral knee pain, to see ortho again soon for injections  Skin: Negative for wound  Neurological: Positive for numbness  Has numbness and tingling of the feet  Psychiatric/Behavioral: Positive for sleep disturbance   Negative for dysphoric mood  The patient is not nervous/anxious  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  Has some difficulty getting to sleep         Historical Information   Past Medical History:   Diagnosis Date    Asthma     seasonal    Diabetes mellitus ([address] )     Osteoarthritis     Tremor, hereditary, benign      Past Surgical History:   Procedure Laterality Date     SECTION      3    TOE SURGERY Bilateral     5 th toe bone removed    TOTAL HIP ARTHROPLASTY Right     TUBAL LIGATION Bilateral      Social History   Social History     Substance and Sexual Activity   Alcohol Use Yes    Comment: socially     Social History     Substance and Sexual Activity   Drug Use No     Social History     Tobacco Use   Smoking Status Former Smoker    Packs/day:     Years: 15     Pack years: 15     Types: Cigarettes    Last attempt to quit: 2007    Years since quittin 7   Smokeless Tobacco Never Used     Family History:   Family History   Problem Relation Age of Onset    Cirrhosis Mother     Heart disease Mother     Esophageal varices Mother     Brain cancer Father     Prostate cancer Father     Stroke Father     Heart attack Father     Skin cancer Brother     COPD Brother     Diabetes type II Maternal Aunt     Diabetes type II Maternal Grandfather     Parkinsonism Maternal Grandfather     Coronary artery disease Brother     Post-traumatic stress disorder Son     No Known Problems Son     No Known Problems Daughter     Parkinsonism Maternal Aunt     Diabetes type I Cousin        Meds/Allergies   Current Outpatient Medications   Medication Sig Dispense Refill    albuterol (PROVENTIL HFA) 90 mcg/act inhaler Inhale      Ascorbic Acid (VITAMIN C) 1000 MG tablet Take 1,000 mg by mouth daily      aspirin 81 MG tablet Take 81 mg by mouth 2 (two) times a day      b complex vitamins capsule Take 1 capsule by mouth daily      BD ULTRA-FINE PEN NEEDLES 29G X 12 7MM MISC Use up to 5 times a day 500 each 3    Biotin 5000 MCG CAPS Take 5,000 capsules by mouth 2 (two) times a day      Calcium Carb-Cholecalciferol (CALCIUM 1000 + D PO) Take by mouth 2 (two) times a day       docusate sodium (COLACE) 100 mg capsule Take 100 mg by mouth daily       ferrous sulfate 325 (65 Fe) mg tablet Take 325 mg by mouth 2 (two) times a day      fluticasone (FLONASE) 50 mcg/act nasal spray 1 spray into each nostril 2 (two) times a day Prn, primarily at hs      folic acid (FOLVITE) 443 mcg tablet Take 400 mcg by mouth 2 (two) times a day      furosemide (LASIX) 40 mg tablet Take 40 mg by mouth daily prn      insulin detemir (LEVEMIR FLEXTOUCH) 100 Units/mL injection pen Inject 24 Units under the skin daily at bedtime 10 pen 0    insulin lispro (HumaLOG) 100 units/mL injection pen Use 2-7 units 4 times a day as directed 20 pen 2    lisinopril (ZESTRIL) 2 5 mg tablet Take 1 tablet (2 5 mg total) by mouth daily 90 tablet 2    metFORMIN (GLUCOPHAGE) 1000 MG tablet Take 1 tablet (1,000 mg total) by mouth 2 (two) times a day with meals 180 tablet 1    Omega-3 Fatty Acids (FISH OIL) 1,000 mg Take 1,000 mg by mouth daily      TRUE METRIX BLOOD GLUCOSE TEST test strip Use as instructed up to 4 times a day 400 each 3    vitamin E, tocopherol, 400 units capsule Take 400 Units by mouth daily       No current facility-administered medications for this visit  Allergies   Allergen Reactions    Actos [Pioglitazone] Edema     Severe lower extremity    Gentamicin     Oxycodone GI Intolerance    Sulfa Antibiotics        Objective   Vitals: Blood pressure 134/84, pulse 90, height 5' 7 32" (1 71 m), weight 117 kg (258 lb 12 8 oz)  Invasive Devices          None          Physical Exam   Constitutional: She is oriented to person, place, and time  She appears well-developed and well-nourished  HENT:   Head: Normocephalic and atraumatic  Eyes: Pupils are equal, round, and reactive to light   Conjunctivae and EOM are normal    Neck: Normal range of motion  Neck supple  No thyromegaly present  No carotid bruits  Cardiovascular: Normal rate, regular rhythm, normal heart sounds and intact distal pulses  No murmur heard  Pulmonary/Chest: Effort normal and breath sounds normal  She has no wheezes  Musculoskeletal: Normal range of motion  She exhibits no edema or deformity  Bilateral lymphedema  Bunions bilateral 1st MP joint  Lymphadenopathy:     She has no cervical adenopathy  Neurological: She is alert and oriented to person, place, and time  She has normal reflexes  Skin: Skin is warm and dry  No rash noted  Vitals reviewed  The history was obtained from the review of the chart and from the patient      Lab Results:    Most recent Alc is  Lab Results   Component Value Date    HGBA1C 7 1 (H) 01/31/2019         CMP done on 01/31/2019 demonstrates a fasting glucose of 74 but was otherwise normal     Lab Results   Component Value Date    CREATININE 0 88 01/31/2019    CREATININE 0 88 10/12/2018    CREATININE 0 70 07/07/2018    BUN 13 01/31/2019     11/10/2015    K 4 3 01/31/2019     01/31/2019    CO2 30 01/31/2019     eGFR   Date Value Ref Range Status   01/31/2019 69 ml/min/1 73sq m Final       Lab Results   Component Value Date    ALT 20 01/31/2019    AST 18 01/31/2019    ALKPHOS 59 01/31/2019    BILITOT 0 42 11/10/2015       Future Appointments   Date Time Provider Hafsa Calvin   5/28/2019  9:40 AM Grayson Muniz MD [facility]

## 2019-02-21 NOTE — LETTER
February 21, 2019     Pauline Alvarez DO  [address] [facility]    [facility] [address]    Patient: Ronald Potter   YOB: 1955   Date of Visit: 2/21/2019       Dear Dr Jenkins Certain: Thank you for referring Tanisha Paul to me for evaluation  Below are my notes for this consultation  If you have questions, please do not hesitate to call me  I look forward to following your patient along with you  Sincerely,        Eileen Keita MD        CC: No Recipients  Eileen Keita MD  2/21/2019  5:10 PM  Sign at close encounter  2/21/2019    Assessment/Plan      Diagnoses and all orders for this visit:    Type 2 diabetes mellitus with diabetic neuropathy, with long-term current use of insulin ([facility] [address] )  -     HEMOGLOBIN A1C W/ EAG ESTIMATION Lab Collect; Future  -     Comprehensive metabolic panel Lab Collect; Future  -     CBC and differential Lab Collect; Future    Mixed hyperlipidemia  -     HEMOGLOBIN A1C W/ EAG ESTIMATION Lab Collect; Future  -     Comprehensive metabolic panel Lab Collect; Future  -     CBC and differential Lab Collect; Future    Other orders  -     vitamin E, tocopherol, 400 units capsule; Take 400 Units by mouth daily  -     docusate sodium (COLACE) 100 mg capsule; Take 100 mg by mouth daily   -     Ascorbic Acid (VITAMIN C) 1000 MG tablet; Take 1,000 mg by mouth daily  -     b complex vitamins capsule; Take 1 capsule by mouth daily        Assessment/Plan:  1  Type 2 diabetes insulin requiring  Her most recent hemoglobin A1c is a bit higher but still relatively stable at 7 1%  She has mostly eliminated most of her hypoglycemia at this point  For now, she will continue the same Levemir insulin and NovoLog insulin along with metformin  She will continue to check her blood sugars up to 4 times a day  2  Diabetic neuropathy  She has stable neuropathic symptoms  Her foot exam is followed over time  3  Hyperlipidemia    Her most recent lipid profile was excellent with just an LDL of 104 on no medications  I have asked her to follow up in 3 months with preceding CMP, CBC, and hemoglobin A1c       CC: Diabetes type 2 and lipid follow-up    History of Present Illness     HPI: Sonia Roca is a 70y o  year old female with type 2 diabetes for 18 years  She is on oral agents and insulin at home and takes Levemir insulin 20-22 units at bedtime, NovoLog insulin 4-6 units at breakfast and lunch, 2-4 units in the afternoon, 5-7 units at supper, and metformin 1000 mg twice a day  She denies any polyuria, polydipsia, polyphagia, and blurry vision  She has twice a night nocturia  She has chronic numbness and tingling of her feet unchanged  She denies chest pain or shortness of breath  She denies nephropathy, retinopathy, heart attack, stroke and claudication but does admit to neuropathy  Hypoglycemic episodes: Yes occasional     The patient's last eye exam was in november 2018 with no retinopathy  The patient's last foot exam was in feb 2019 and told her she has arthritis and neuropathy in the feet  Last A1C was done on 01/31/2019 was  Lab Results   Component Value Date    HGBA1C 7 1 (H) 01/31/2019     Blood Sugar/Glucometer/Pump/CGM review: checks blood sugars 1-2 times a day while busy, normally 3-4 times a day  Before breakfast: , fewer low blood sugars in am  Before lunch:   Before dinner: more variable with afternoon snack, can be high if forgets afternoon insulin  Bedtime:     Review of Systems   Constitutional: Negative for fatigue and unexpected weight change  3 lb more than last visit  HENT: Negative for trouble swallowing  Eyes: Negative for visual disturbance  Wears glasses  Respiratory: Negative for chest tightness and shortness of breath  Cardiovascular: Positive for leg swelling  Negative for chest pain and palpitations  Lymphedema is unchanged   Had EKG per PCP recently which was normal    Gastrointestinal: Negative for abdominal pain, constipation, diarrhea and nausea  Endocrine: Negative for polydipsia, polyphagia and polyuria  Polyuria after lasix  Will have polyuria with increased water consumption  Nocturia twice a night  Musculoskeletal: Positive for arthralgias  Negative for back pain  Bilateral knee pain, to see ortho again soon for injections  Skin: Negative for wound  Neurological: Positive for numbness  Has numbness and tingling of the feet  Psychiatric/Behavioral: Positive for sleep disturbance  Negative for dysphoric mood  The patient is not nervous/anxious  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  Has some difficulty getting to sleep         Historical Information   Past Medical History:   Diagnosis Date    Asthma     seasonal    Diabetes mellitus ([facility] [address] )     Osteoarthritis     Tremor, hereditary, benign      Past Surgical History:   Procedure Laterality Date     SECTION      3    TOE SURGERY Bilateral     5 th toe bone removed    TOTAL HIP ARTHROPLASTY Right     TUBAL LIGATION Bilateral      Social History   Social History     Substance and Sexual Activity   Alcohol Use Yes    Comment: socially     Social History     Substance and Sexual Activity   Drug Use No     Social History     Tobacco Use   Smoking Status Former Smoker    Packs/day: 1 00    Years: 15 00    Pack years: 15 00    Types: Cigarettes    Last attempt to quit: 2007    Years since quittin 7   Smokeless Tobacco Never Used     Family History:   Family History   Problem Relation Age of Onset    Cirrhosis Mother     Heart disease Mother     Esophageal varices Mother     Brain cancer Father     Prostate cancer Father     Stroke Father     Heart attack Father     Skin cancer Brother     COPD Brother     Diabetes type II Maternal Aunt     Diabetes type II Maternal Grandfather     Parkinsonism Maternal Grandfather     Coronary artery disease Brother     Post-traumatic stress disorder Son     No Known Problems Son     No Known Problems Daughter     Parkinsonism Maternal Aunt     Diabetes type I Cousin        Meds/Allergies   Current Outpatient Medications   Medication Sig Dispense Refill    albuterol (PROVENTIL HFA) 90 mcg/act inhaler Inhale      Ascorbic Acid (VITAMIN C) 1000 MG tablet Take 1,000 mg by mouth daily      aspirin 81 MG tablet Take 81 mg by mouth 2 (two) times a day      b complex vitamins capsule Take 1 capsule by mouth daily      BD ULTRA-FINE PEN NEEDLES 29G X 12 7MM MISC Use up to 5 times a day 500 each 3    Biotin 5000 MCG CAPS Take 5,000 capsules by mouth 2 (two) times a day      Calcium Carb-Cholecalciferol (CALCIUM 1000 + D PO) Take by mouth 2 (two) times a day       docusate sodium (COLACE) 100 mg capsule Take 100 mg by mouth daily       ferrous sulfate 325 (65 Fe) mg tablet Take 325 mg by mouth 2 (two) times a day      fluticasone (FLONASE) 50 mcg/act nasal spray 1 spray into each nostril 2 (two) times a day Prn, primarily at hs      folic acid (FOLVITE) 928 mcg tablet Take 400 mcg by mouth 2 (two) times a day      furosemide (LASIX) 40 mg tablet Take 40 mg by mouth daily prn      insulin detemir (LEVEMIR FLEXTOUCH) 100 Units/mL injection pen Inject 24 Units under the skin daily at bedtime 10 pen 0    insulin lispro (HumaLOG) 100 units/mL injection pen Use 2-7 units 4 times a day as directed 20 pen 2    lisinopril (ZESTRIL) 2 5 mg tablet Take 1 tablet (2 5 mg total) by mouth daily 90 tablet 2    metFORMIN (GLUCOPHAGE) 1000 MG tablet Take 1 tablet (1,000 mg total) by mouth 2 (two) times a day with meals 180 tablet 1    Omega-3 Fatty Acids (FISH OIL) 1,000 mg Take 1,000 mg by mouth daily      TRUE METRIX BLOOD GLUCOSE TEST test strip Use as instructed up to 4 times a day 400 each 3    vitamin E, tocopherol, 400 units capsule Take 400 Units by mouth daily       No current facility-administered medications for this visit        Allergies Allergen Reactions    Actos [Pioglitazone] Edema     Severe lower extremity    Gentamicin     Oxycodone GI Intolerance    Sulfa Antibiotics        Objective   Vitals: Blood pressure 134/84, pulse 90, height 5' 7 32" (1 71 m), weight 117 kg (258 lb 12 8 oz)  Invasive Devices          None          Physical Exam   Constitutional: She is oriented to person, place, and time  She appears well-developed and well-nourished  HENT:   Head: Normocephalic and atraumatic  Eyes: Pupils are equal, round, and reactive to light  Conjunctivae and EOM are normal    Neck: Normal range of motion  Neck supple  No thyromegaly present  No carotid bruits  Cardiovascular: Normal rate, regular rhythm, normal heart sounds and intact distal pulses  No murmur heard  Pulmonary/Chest: Effort normal and breath sounds normal  She has no wheezes  Musculoskeletal: Normal range of motion  She exhibits no edema or deformity  Bilateral lymphedema  Bunions bilateral 1st MP joint  Lymphadenopathy:     She has no cervical adenopathy  Neurological: She is alert and oriented to person, place, and time  She has normal reflexes  Skin: Skin is warm and dry  No rash noted  Vitals reviewed  The history was obtained from the review of the chart and from the patient      Lab Results:    Most recent Alc is  Lab Results   Component Value Date    HGBA1C 7 1 (H) 01/31/2019         CMP done on 01/31/2019 demonstrates a fasting glucose of 74 but was otherwise normal     Lab Results   Component Value Date    CREATININE 0 88 01/31/2019    CREATININE 0 88 10/12/2018    CREATININE 0 70 07/07/2018    BUN 13 01/31/2019     11/10/2015    K 4 3 01/31/2019     01/31/2019    CO2 30 01/31/2019     eGFR   Date Value Ref Range Status   01/31/2019 69 ml/min/1 73sq m Final       Lab Results   Component Value Date    ALT 20 01/31/2019    AST 18 01/31/2019    ALKPHOS 59 01/31/2019    BILITOT 0 42 11/10/2015       Future Appointments   Date Time Provider Hafsa Calvin   5/28/2019  9:40 AM Alba Greenberg MD [facility]

## 2019-04-14 DIAGNOSIS — E11.40 TYPE 2 DIABETES MELLITUS WITH DIABETIC NEUROPATHY, WITH LONG-TERM CURRENT USE OF INSULIN (HCC): ICD-10-CM

## 2019-04-14 DIAGNOSIS — Z79.4 TYPE 2 DIABETES MELLITUS WITH DIABETIC NEUROPATHY, WITH LONG-TERM CURRENT USE OF INSULIN (HCC): ICD-10-CM

## 2019-05-24 ENCOUNTER — APPOINTMENT (OUTPATIENT)
Dept: LAB | Facility: CLINIC | Age: 64
End: 2019-05-24
Payer: MEDICARE

## 2019-05-24 DIAGNOSIS — Z79.4 TYPE 2 DIABETES MELLITUS WITH DIABETIC NEUROPATHY, WITH LONG-TERM CURRENT USE OF INSULIN (HCC): ICD-10-CM

## 2019-05-24 DIAGNOSIS — E11.40 TYPE 2 DIABETES MELLITUS WITH DIABETIC NEUROPATHY, WITH LONG-TERM CURRENT USE OF INSULIN (HCC): ICD-10-CM

## 2019-05-24 DIAGNOSIS — E78.2 MIXED HYPERLIPIDEMIA: ICD-10-CM

## 2019-05-24 LAB
ALBUMIN SERPL BCP-MCNC: 3.5 G/DL (ref 3.5–5)
ALP SERPL-CCNC: 56 U/L (ref 46–116)
ALT SERPL W P-5'-P-CCNC: 16 U/L (ref 12–78)
ANION GAP SERPL CALCULATED.3IONS-SCNC: 2 MMOL/L (ref 4–13)
AST SERPL W P-5'-P-CCNC: 13 U/L (ref 5–45)
BASOPHILS # BLD AUTO: 0.02 THOUSANDS/ΜL (ref 0–0.1)
BASOPHILS NFR BLD AUTO: 0 % (ref 0–1)
BILIRUB SERPL-MCNC: 0.29 MG/DL (ref 0.2–1)
BUN SERPL-MCNC: 13 MG/DL (ref 5–25)
CALCIUM SERPL-MCNC: 8.5 MG/DL (ref 8.3–10.1)
CHLORIDE SERPL-SCNC: 108 MMOL/L (ref 100–108)
CO2 SERPL-SCNC: 31 MMOL/L (ref 21–32)
CREAT SERPL-MCNC: 0.92 MG/DL (ref 0.6–1.3)
EOSINOPHIL # BLD AUTO: 0.32 THOUSAND/ΜL (ref 0–0.61)
EOSINOPHIL NFR BLD AUTO: 6 % (ref 0–6)
ERYTHROCYTE [DISTWIDTH] IN BLOOD BY AUTOMATED COUNT: 12.3 % (ref 11.6–15.1)
EST. AVERAGE GLUCOSE BLD GHB EST-MCNC: 169 MG/DL
GFR SERPL CREATININE-BSD FRML MDRD: 66 ML/MIN/1.73SQ M
GLUCOSE P FAST SERPL-MCNC: 63 MG/DL (ref 65–99)
HBA1C MFR BLD: 7.5 % (ref 4.2–6.3)
HCT VFR BLD AUTO: 36.3 % (ref 34.8–46.1)
HGB BLD-MCNC: 11.7 G/DL (ref 11.5–15.4)
IMM GRANULOCYTES # BLD AUTO: 0 THOUSAND/UL (ref 0–0.2)
IMM GRANULOCYTES NFR BLD AUTO: 0 % (ref 0–2)
LYMPHOCYTES # BLD AUTO: 2 THOUSANDS/ΜL (ref 0.6–4.47)
LYMPHOCYTES NFR BLD AUTO: 39 % (ref 14–44)
MCH RBC QN AUTO: 30.5 PG (ref 26.8–34.3)
MCHC RBC AUTO-ENTMCNC: 32.2 G/DL (ref 31.4–37.4)
MCV RBC AUTO: 95 FL (ref 82–98)
MONOCYTES # BLD AUTO: 0.58 THOUSAND/ΜL (ref 0.17–1.22)
MONOCYTES NFR BLD AUTO: 11 % (ref 4–12)
NEUTROPHILS # BLD AUTO: 2.15 THOUSANDS/ΜL (ref 1.85–7.62)
NEUTS SEG NFR BLD AUTO: 44 % (ref 43–75)
NRBC BLD AUTO-RTO: 0 /100 WBCS
PLATELET # BLD AUTO: 281 THOUSANDS/UL (ref 149–390)
PMV BLD AUTO: 10.3 FL (ref 8.9–12.7)
POTASSIUM SERPL-SCNC: 4.3 MMOL/L (ref 3.5–5.3)
PROT SERPL-MCNC: 6.3 G/DL (ref 6.4–8.2)
RBC # BLD AUTO: 3.83 MILLION/UL (ref 3.81–5.12)
SODIUM SERPL-SCNC: 141 MMOL/L (ref 136–145)
WBC # BLD AUTO: 5.07 THOUSAND/UL (ref 4.31–10.16)

## 2019-05-24 PROCEDURE — 85025 COMPLETE CBC W/AUTO DIFF WBC: CPT

## 2019-05-24 PROCEDURE — 80053 COMPREHEN METABOLIC PANEL: CPT

## 2019-05-24 PROCEDURE — 83036 HEMOGLOBIN GLYCOSYLATED A1C: CPT

## 2019-05-24 PROCEDURE — 36415 COLL VENOUS BLD VENIPUNCTURE: CPT

## 2019-05-28 ENCOUNTER — OFFICE VISIT (OUTPATIENT)
Dept: ENDOCRINOLOGY | Facility: HOSPITAL | Age: 64
End: 2019-05-28
Payer: MEDICARE

## 2019-05-28 VITALS
WEIGHT: 264.2 LBS | SYSTOLIC BLOOD PRESSURE: 126 MMHG | BODY MASS INDEX: 41.47 KG/M2 | HEIGHT: 67 IN | HEART RATE: 74 BPM | DIASTOLIC BLOOD PRESSURE: 78 MMHG

## 2019-05-28 DIAGNOSIS — Z79.4 TYPE 2 DIABETES MELLITUS WITH DIABETIC NEUROPATHY, WITH LONG-TERM CURRENT USE OF INSULIN (HCC): Primary | ICD-10-CM

## 2019-05-28 DIAGNOSIS — E78.2 MIXED HYPERLIPIDEMIA: ICD-10-CM

## 2019-05-28 DIAGNOSIS — E11.40 TYPE 2 DIABETES MELLITUS WITH DIABETIC NEUROPATHY, WITH LONG-TERM CURRENT USE OF INSULIN (HCC): Primary | ICD-10-CM

## 2019-05-28 PROCEDURE — 99214 OFFICE O/P EST MOD 30 MIN: CPT | Performed by: INTERNAL MEDICINE

## 2019-07-17 DIAGNOSIS — I10 ESSENTIAL HYPERTENSION: ICD-10-CM

## 2019-07-17 DIAGNOSIS — R80.9 MICROALBUMINURIA: ICD-10-CM

## 2019-07-17 RX ORDER — LISINOPRIL 2.5 MG/1
TABLET ORAL
Qty: 90 TABLET | Refills: 2 | Status: SHIPPED | OUTPATIENT
Start: 2019-07-17 | End: 2020-01-21

## 2019-08-31 ENCOUNTER — LAB (OUTPATIENT)
Dept: LAB | Facility: CLINIC | Age: 64
End: 2019-08-31
Payer: MEDICARE

## 2019-08-31 DIAGNOSIS — E78.2 MIXED HYPERLIPIDEMIA: ICD-10-CM

## 2019-08-31 DIAGNOSIS — E11.40 TYPE 2 DIABETES MELLITUS WITH DIABETIC NEUROPATHY, WITH LONG-TERM CURRENT USE OF INSULIN (HCC): ICD-10-CM

## 2019-08-31 DIAGNOSIS — Z79.4 TYPE 2 DIABETES MELLITUS WITH DIABETIC NEUROPATHY, WITH LONG-TERM CURRENT USE OF INSULIN (HCC): ICD-10-CM

## 2019-08-31 LAB
ALBUMIN SERPL BCP-MCNC: 3.5 G/DL (ref 3.5–5)
ALP SERPL-CCNC: 73 U/L (ref 46–116)
ALT SERPL W P-5'-P-CCNC: 16 U/L (ref 12–78)
ANION GAP SERPL CALCULATED.3IONS-SCNC: 4 MMOL/L (ref 4–13)
AST SERPL W P-5'-P-CCNC: 10 U/L (ref 5–45)
BILIRUB SERPL-MCNC: 0.38 MG/DL (ref 0.2–1)
BUN SERPL-MCNC: 12 MG/DL (ref 5–25)
CALCIUM SERPL-MCNC: 9.4 MG/DL (ref 8.3–10.1)
CHLORIDE SERPL-SCNC: 106 MMOL/L (ref 100–108)
CHOLEST SERPL-MCNC: 167 MG/DL (ref 50–200)
CO2 SERPL-SCNC: 30 MMOL/L (ref 21–32)
CREAT SERPL-MCNC: 0.8 MG/DL (ref 0.6–1.3)
CREAT UR-MCNC: 60.6 MG/DL
EST. AVERAGE GLUCOSE BLD GHB EST-MCNC: 143 MG/DL
GFR SERPL CREATININE-BSD FRML MDRD: 78 ML/MIN/1.73SQ M
GLUCOSE P FAST SERPL-MCNC: 85 MG/DL (ref 65–99)
HBA1C MFR BLD: 6.6 % (ref 4.2–6.3)
HDLC SERPL-MCNC: 61 MG/DL (ref 40–60)
LDLC SERPL CALC-MCNC: 94 MG/DL (ref 0–100)
MICROALBUMIN UR-MCNC: 9 MG/L (ref 0–20)
MICROALBUMIN/CREAT 24H UR: 15 MG/G CREATININE (ref 0–30)
POTASSIUM SERPL-SCNC: 4.2 MMOL/L (ref 3.5–5.3)
PROT SERPL-MCNC: 7 G/DL (ref 6.4–8.2)
SODIUM SERPL-SCNC: 140 MMOL/L (ref 136–145)
TRIGL SERPL-MCNC: 59 MG/DL
TSH SERPL DL<=0.05 MIU/L-ACNC: 2.49 UIU/ML (ref 0.36–3.74)

## 2019-08-31 PROCEDURE — 83036 HEMOGLOBIN GLYCOSYLATED A1C: CPT

## 2019-08-31 PROCEDURE — 80061 LIPID PANEL: CPT

## 2019-08-31 PROCEDURE — 84443 ASSAY THYROID STIM HORMONE: CPT

## 2019-08-31 PROCEDURE — 36415 COLL VENOUS BLD VENIPUNCTURE: CPT

## 2019-08-31 PROCEDURE — 82570 ASSAY OF URINE CREATININE: CPT

## 2019-08-31 PROCEDURE — 80053 COMPREHEN METABOLIC PANEL: CPT

## 2019-08-31 PROCEDURE — 82043 UR ALBUMIN QUANTITATIVE: CPT

## 2019-09-05 ENCOUNTER — OFFICE VISIT (OUTPATIENT)
Dept: ENDOCRINOLOGY | Facility: HOSPITAL | Age: 64
End: 2019-09-05
Payer: MEDICARE

## 2019-09-05 VITALS
SYSTOLIC BLOOD PRESSURE: 126 MMHG | WEIGHT: 257.8 LBS | HEART RATE: 67 BPM | HEIGHT: 67 IN | BODY MASS INDEX: 40.46 KG/M2 | DIASTOLIC BLOOD PRESSURE: 90 MMHG

## 2019-09-05 DIAGNOSIS — Z79.4 TYPE 2 DIABETES MELLITUS WITH DIABETIC NEUROPATHY, WITH LONG-TERM CURRENT USE OF INSULIN (HCC): Primary | ICD-10-CM

## 2019-09-05 DIAGNOSIS — E11.40 TYPE 2 DIABETES MELLITUS WITH DIABETIC NEUROPATHY, WITH LONG-TERM CURRENT USE OF INSULIN (HCC): Primary | ICD-10-CM

## 2019-09-05 DIAGNOSIS — I10 ESSENTIAL HYPERTENSION: ICD-10-CM

## 2019-09-05 DIAGNOSIS — R80.9 MICROALBUMINURIA: ICD-10-CM

## 2019-09-05 DIAGNOSIS — E78.2 MIXED HYPERLIPIDEMIA: ICD-10-CM

## 2019-09-05 PROCEDURE — 99214 OFFICE O/P EST MOD 30 MIN: CPT | Performed by: NURSE PRACTITIONER

## 2019-09-05 NOTE — PATIENT INSTRUCTIONS
Be mindful of diet       Stay active and stay hydrated       For now, continue current regimen      Please check your blood sugars 3-4 times daily and forward a record to the office in 1-2 weeks for review and further adjustment, as needed       Continue Lasix and lisinopril for treatment of your blood pressure/urine microalbumin      Continue Omega 3 fatty acid fish oil daily       Obtain lab work as prescribed

## 2019-09-05 NOTE — PROGRESS NOTES
Jamil Rodas 75 y o  female MRN: [id number]    Encounter: [id number]      Assessment/Plan     Assessment: This is a 75y o -year-old female with type 2 diabetes with neuropathy, hypertension and hyperlipidemia  Plan:  1   Type 2 diabetes insulin requiring:  Her most recent hemoglobin A1c has improved to 6 6  She has had very rare and mild hypoglycemia  For now, she will continue her current regimen  Olselina Rice will inject her insulin before her meals and continue to check her blood sugars 3-4 times daily   I have asked her to send a record of her blood sugars to the office in 1-2 weeks for review so further changes can be made to help her blood sugars throughout the day  Diabetic foot exam performed in the office today reveals good monofilament sensation  She will continue to follow up with Ophthalmology for her annual diabetic eye exam in November 2019   Check hemoglobin A1c prior to next visit      2   Hypertension/microalbuminuria:  Continue lisinopril   Check comprehensive metabolic panel and urine microalbumin prior to next visit      3   Hyperlipidemia:  Stable   Continue Omega 3 fatty acid fish oil  CC:  Type 2 Diabetes follow-up    History of Present Illness     HPI:  75y o  year old female with type 2 diabetes for 18 years   She is on oral agents and insulin at home and takes Metformin 1000 mg twice daily, Levemir insulin 20-22 units at bedtime, and Humalog insulin 4-6 units at breakfast and lunch, 2-3 units in the afternoon, and 5-7 units at supper  Her most recent hemoglobin A1c from August 31, 2019 is 6 6  Her recent blood sugars have been lower in the morning consistently  She denies any polydipsia, and blurry vision   She has no polyphagia   She has 2 times per night nocturia and polyuria  She has no chest pain or shortness of breath   She denies nephropathy, retinopathy, heart attack, stroke and claudication but does admit to neuropathy      Her most recent diabetic eye exam was in 2018 and no retinopathy  She complains of some numbness and tingling to her feet at times   Diabetic foot exam performed at last office visit with Dr Raghav Estrada  She is interested in following up with podiatry      Her hypertension is treated with Lasix 40 mg daily and lisinopril 2 5 mg daily        For her hyperlipidemia, she is taking Omega 3 fatty acid fish oil 1000 mg daily  Violetta Chloe most recent fasting lipid panel from 2019 reveals an LDL of 94, HDL of 61, triglycerides of 59 and total cholesterol of 167  Review of Systems   Constitutional: Negative  Negative for chills, fatigue and fever  HENT: Negative  Negative for trouble swallowing and voice change  Eyes: Negative  Respiratory: Negative  Negative for chest tightness and shortness of breath  Cardiovascular: Negative  Negative for chest pain  Gastrointestinal: Negative  Negative for abdominal pain, constipation, diarrhea and vomiting  Endocrine: Positive for polyuria (Attributed to Lasix)  Negative for cold intolerance, heat intolerance, polydipsia and polyphagia  Genitourinary: Negative  Musculoskeletal: Positive for arthralgias ( attributed to arthritis) and myalgias  Skin: Negative  Allergic/Immunologic: Negative  Neurological: Positive for numbness ( in feet at times)  Negative for dizziness, syncope, light-headedness and headaches  Hematological: Negative  Psychiatric/Behavioral: Negative  All other systems reviewed and are negative        Historical Information   Past Medical History:   Diagnosis Date    Asthma     seasonal    Diabetes mellitus ([facility] [address] )     Osteoarthritis     Tremor, hereditary, benign      Past Surgical History:   Procedure Laterality Date     SECTION      3    TOE SURGERY Bilateral     5 th toe bone removed    TOTAL HIP ARTHROPLASTY Right     TUBAL LIGATION Bilateral      Social History   Social History     Substance and Sexual Activity   Alcohol Use Yes    Frequency: 2-4 times a month    Comment: socially     Social History     Substance and Sexual Activity   Drug Use No     Social History     Tobacco Use   Smoking Status Former Smoker    Packs/day: 1 00    Years: 15 00    Pack years: 15 00    Types: Cigarettes    Last attempt to quit: 2007    Years since quittin 2   Smokeless Tobacco Never Used     Family History:   Family History   Problem Relation Age of Onset    Cirrhosis Mother     Heart disease Mother     Esophageal varices Mother     Brain cancer Father     Prostate cancer Father     Stroke Father     Heart attack Father     Skin cancer Brother     COPD Brother     Diabetes type II Maternal Aunt     Diabetes type II Maternal Grandfather     Parkinsonism Maternal Grandfather     Coronary artery disease Brother     Prostate cancer Brother     Post-traumatic stress disorder Son     No Known Problems Son     No Known Problems Daughter     Parkinsonism Maternal Aunt     Diabetes type I Cousin        Meds/Allergies   Current Outpatient Medications   Medication Sig Dispense Refill    albuterol (PROVENTIL HFA) 90 mcg/act inhaler Inhale      Ascorbic Acid (VITAMIN C) 1000 MG tablet Take 1,000 mg by mouth daily      aspirin 81 MG tablet Take 81 mg by mouth 2 (two) times a day      b complex vitamins capsule Take 1 capsule by mouth daily      BD ULTRA-FINE PEN NEEDLES 29G X 12 7MM MISC Use up to 5 times a day 500 each 3    Biotin 5000 MCG CAPS Take 5,000 capsules by mouth 2 (two) times a day      Calcium Carbonate (CALCIUM 600 PO) Take 2 tablets by mouth daily      docusate sodium (COLACE) 100 mg capsule Take 100 mg by mouth daily       ferrous sulfate 325 (65 Fe) mg tablet Take 325 mg by mouth 2 (two) times a day      fluticasone (FLONASE) 50 mcg/act nasal spray 1 spray into each nostril 2 (two) times a day Prn, primarily at hs      folic acid (FOLVITE) 616 mcg tablet Take 800 mcg by mouth daily       furosemide (LASIX) 40 mg tablet Take 40 mg by mouth daily prn      insulin detemir (LEVEMIR FLEXTOUCH) 100 Units/mL injection pen Inject 24 Units under the skin daily at bedtime 10 pen 0    insulin lispro (HumaLOG) 100 units/mL injection pen Use 2-7 units 4 times a day as directed 20 pen 2    lisinopril (ZESTRIL) 2 5 mg tablet TAKE 1 TABLET BY MOUTH  DAILY 90 tablet 2    metFORMIN (GLUCOPHAGE) 1000 MG tablet TAKE 1 TABLET BY MOUTH TWO  TIMES DAILY WITH MEALS 180 tablet 1    Omega-3 Fatty Acids (FISH OIL) 1,000 mg Take 1,000 mg by mouth daily      TRUE METRIX BLOOD GLUCOSE TEST test strip Use as instructed up to 4 times a day 400 each 3    vitamin E, tocopherol, 400 units capsule Take 400 Units by mouth daily      Calcium Carb-Cholecalciferol (CALCIUM 1000 + D PO) Take by mouth 2 (two) times a day        No current facility-administered medications for this visit  Allergies   Allergen Reactions    Actos [Pioglitazone] Edema     Severe lower extremity    Gentamicin     Oxycodone GI Intolerance    Sulfa Antibiotics        Objective   Vitals: Blood pressure 126/90, pulse 67, height 5' 7 32" (1 71 m), weight 117 kg (257 lb 12 8 oz)  Physical Exam   Constitutional: She is oriented to person, place, and time  She appears well-developed and well-nourished  Obese   HENT:   Head: Normocephalic and atraumatic  Mouth/Throat: Oropharynx is clear and moist    Eyes: Pupils are equal, round, and reactive to light  Conjunctivae and EOM are normal    Wears glasses   Neck: Normal range of motion  Neck supple  Cardiovascular: Normal rate, regular rhythm, normal heart sounds and intact distal pulses  Pulses are no weak pulses  Pulses:       Dorsalis pedis pulses are 1+ on the right side, and 1+ on the left side  Posterior tibial pulses are 1+ on the right side, and 1+ on the left side  Pulmonary/Chest: Effort normal and breath sounds normal    Abdominal: Soft   Bowel sounds are normal    Musculoskeletal: Normal range of motion  She exhibits edema (Lymph edema)  Feet:   Right Foot:   Skin Integrity: Negative for ulcer, skin breakdown, erythema, warmth, callus or dry skin  Left Foot:   Skin Integrity: Negative for ulcer, skin breakdown, erythema, warmth, callus or dry skin  Neurological: She is alert and oriented to person, place, and time  Skin: Skin is warm and dry  Dry skin   Psychiatric: She has a normal mood and affect  Her behavior is normal  Judgment and thought content normal    Vitals reviewed  Patient's shoes and socks removed  Right Foot/Ankle   Right Foot Inspection  Skin Exam: skin normal and skin intact no dry skin, no warmth, no callus, no erythema, no maceration, no abnormal color, no pre-ulcer, no ulcer and no callus                          Toe Exam: ROM and strength within normal limits  Sensory       Monofilament testing: intact  Vascular  Capillary refills: < 3 seconds  The right DP pulse is 1+  The right PT pulse is 1+  Left Foot/Ankle  Left Foot Inspection  Skin Exam: skin normal and skin intactno dry skin, no warmth, no erythema, no maceration, normal color, no pre-ulcer, no ulcer and no callus                         Toe Exam: ROM and strength within normal limits                   Sensory       Monofilament: intact  Vascular  Capillary refills: < 3 seconds  The left DP pulse is 1+  The left PT pulse is 1+  Assign Risk Category:  No deformity present; No loss of protective sensation;  No weak pulses       Risk: 0        Lab Results:   Lab Results   Component Value Date/Time    Hemoglobin A1C 6 6 (H) 08/31/2019 07:47 AM    Hemoglobin A1C 7 5 (H) 05/24/2019 07:42 AM    Hemoglobin A1C 7 1 (H) 01/31/2019 08:11 AM    WBC 5 07 05/24/2019 07:42 AM    Hemoglobin 11 7 05/24/2019 07:42 AM    Hematocrit 36 3 05/24/2019 07:42 AM    MCV 95 05/24/2019 07:42 AM    Platelets 022 16/74/1658 07:42 AM    BUN 12 08/31/2019 07:47 AM    BUN 13 05/24/2019 07:42 AM    BUN 13 01/31/2019 08:11 AM    Potassium 4 2 08/31/2019 07:47 AM    Potassium 4 3 05/24/2019 07:42 AM    Potassium 4 3 01/31/2019 08:11 AM    Chloride 106 08/31/2019 07:47 AM    Chloride 108 05/24/2019 07:42 AM    Chloride 105 01/31/2019 08:11 AM    CO2 30 08/31/2019 07:47 AM    CO2 31 05/24/2019 07:42 AM    CO2 30 01/31/2019 08:11 AM    Creatinine 0 80 08/31/2019 07:47 AM    Creatinine 0 92 05/24/2019 07:42 AM    Creatinine 0 88 01/31/2019 08:11 AM    AST 10 08/31/2019 07:47 AM    AST 13 05/24/2019 07:42 AM    AST 18 01/31/2019 08:11 AM    ALT 16 08/31/2019 07:47 AM    ALT 16 05/24/2019 07:42 AM    ALT 20 01/31/2019 08:11 AM    Albumin 3 5 08/31/2019 07:47 AM    Albumin 3 5 05/24/2019 07:42 AM    Albumin 3 8 01/31/2019 08:11 AM    HDL, Direct 61 (H) 08/31/2019 07:47 AM    Triglycerides 59 08/31/2019 07:47 AM       Portions of the record may have been created with voice recognition software  Occasional wrong word or "sound a like" substitutions may have occurred due to the inherent limitations of voice recognition software  Read the chart carefully and recognize, using context, where substitutions have occurred

## 2019-10-16 LAB
LEFT EYE DIABETIC RETINOPATHY: NORMAL
RIGHT EYE DIABETIC RETINOPATHY: NORMAL

## 2019-12-10 DIAGNOSIS — E11.40 TYPE 2 DIABETES MELLITUS WITH DIABETIC NEUROPATHY, WITH LONG-TERM CURRENT USE OF INSULIN (HCC): ICD-10-CM

## 2019-12-10 DIAGNOSIS — Z79.4 TYPE 2 DIABETES MELLITUS WITH DIABETIC NEUROPATHY, WITH LONG-TERM CURRENT USE OF INSULIN (HCC): ICD-10-CM

## 2020-01-08 ENCOUNTER — LAB (OUTPATIENT)
Dept: LAB | Facility: CLINIC | Age: 65
End: 2020-01-08
Payer: MEDICARE

## 2020-01-08 ENCOUNTER — TELEPHONE (OUTPATIENT)
Dept: ENDOCRINOLOGY | Facility: HOSPITAL | Age: 65
End: 2020-01-08

## 2020-01-08 DIAGNOSIS — E11.40 TYPE 2 DIABETES MELLITUS WITH DIABETIC NEUROPATHY, WITH LONG-TERM CURRENT USE OF INSULIN (HCC): ICD-10-CM

## 2020-01-08 DIAGNOSIS — I10 ESSENTIAL HYPERTENSION: ICD-10-CM

## 2020-01-08 DIAGNOSIS — Z79.4 TYPE 2 DIABETES MELLITUS WITH DIABETIC NEUROPATHY, WITH LONG-TERM CURRENT USE OF INSULIN (HCC): ICD-10-CM

## 2020-01-08 LAB
ALBUMIN SERPL BCP-MCNC: 3.5 G/DL (ref 3.5–5)
ALP SERPL-CCNC: 60 U/L (ref 46–116)
ALT SERPL W P-5'-P-CCNC: 17 U/L (ref 12–78)
ANION GAP SERPL CALCULATED.3IONS-SCNC: 4 MMOL/L (ref 4–13)
AST SERPL W P-5'-P-CCNC: 11 U/L (ref 5–45)
BILIRUB SERPL-MCNC: 0.5 MG/DL (ref 0.2–1)
BUN SERPL-MCNC: 15 MG/DL (ref 5–25)
CALCIUM SERPL-MCNC: 9.8 MG/DL (ref 8.3–10.1)
CHLORIDE SERPL-SCNC: 108 MMOL/L (ref 100–108)
CO2 SERPL-SCNC: 31 MMOL/L (ref 21–32)
CREAT SERPL-MCNC: 1.05 MG/DL (ref 0.6–1.3)
EST. AVERAGE GLUCOSE BLD GHB EST-MCNC: 137 MG/DL
GFR SERPL CREATININE-BSD FRML MDRD: 55 ML/MIN/1.73SQ M
GLUCOSE SERPL-MCNC: 40 MG/DL (ref 65–140)
HBA1C MFR BLD: 6.4 % (ref 4.2–6.3)
POTASSIUM SERPL-SCNC: 4 MMOL/L (ref 3.5–5.3)
PROT SERPL-MCNC: 6.9 G/DL (ref 6.4–8.2)
SODIUM SERPL-SCNC: 143 MMOL/L (ref 136–145)

## 2020-01-08 PROCEDURE — 36415 COLL VENOUS BLD VENIPUNCTURE: CPT

## 2020-01-08 PROCEDURE — 83036 HEMOGLOBIN GLYCOSYLATED A1C: CPT

## 2020-01-08 PROCEDURE — 80053 COMPREHEN METABOLIC PANEL: CPT

## 2020-01-08 NOTE — RESULT ENCOUNTER NOTE
Please call the patient regarding abnormal result  She is being seen tomorrow but it appears she had a glucose of 40 when having her blood drawn    Can we call in check in?

## 2020-01-09 ENCOUNTER — VBI (OUTPATIENT)
Dept: ENDOCRINOLOGY | Facility: HOSPITAL | Age: 65
End: 2020-01-09

## 2020-01-09 ENCOUNTER — OFFICE VISIT (OUTPATIENT)
Dept: ENDOCRINOLOGY | Facility: HOSPITAL | Age: 65
End: 2020-01-09
Payer: MEDICARE

## 2020-01-09 VITALS
BODY MASS INDEX: 38.83 KG/M2 | WEIGHT: 247.4 LBS | SYSTOLIC BLOOD PRESSURE: 144 MMHG | HEART RATE: 80 BPM | HEIGHT: 67 IN | DIASTOLIC BLOOD PRESSURE: 80 MMHG

## 2020-01-09 DIAGNOSIS — E78.2 MIXED HYPERLIPIDEMIA: ICD-10-CM

## 2020-01-09 DIAGNOSIS — R80.9 MICROALBUMINURIA: ICD-10-CM

## 2020-01-09 DIAGNOSIS — I10 ESSENTIAL HYPERTENSION: ICD-10-CM

## 2020-01-09 DIAGNOSIS — E11.40 TYPE 2 DIABETES MELLITUS WITH DIABETIC NEUROPATHY, WITH LONG-TERM CURRENT USE OF INSULIN (HCC): Primary | ICD-10-CM

## 2020-01-09 DIAGNOSIS — Z79.4 TYPE 2 DIABETES MELLITUS WITH DIABETIC NEUROPATHY, WITH LONG-TERM CURRENT USE OF INSULIN (HCC): Primary | ICD-10-CM

## 2020-01-09 PROCEDURE — 99214 OFFICE O/P EST MOD 30 MIN: CPT | Performed by: NURSE PRACTITIONER

## 2020-01-09 NOTE — PATIENT INSTRUCTIONS
Be mindful of diet       Stay active and stay hydrated       For now, continue current regimen      Please check your blood sugars 3-4 times daily and forward a record to the office in 1-2 weeks for review and further adjustment, as needed       Continue Lasix and lisinopril for treatment of your blood pressure      Continue Omega 3 fatty acid fish oil daily       Obtain lab work as prescribed

## 2020-01-09 NOTE — PROGRESS NOTES
Grant Slaughter 75 y o  female MRN: [id number]    Encounter: [id number]      Assessment/Plan     Assessment: This is a 75y o -year-old female with type 2 diabetes with neuropathy, hypertension and hyperlipidemia  Plan:  1   Type 2 diabetes insulin requiring:  Her most recent hemoglobin A1c has improved to 6  4   She presents with no blood sugar records or meter for download in the office today   By her report , she has had rare hypoglycemia over the past few months  For now, she will continue her current regimen  She will inject her insulin before her meals and continue to check her blood sugars 3-4 times daily   I have asked her to send a record of her blood sugars to the office in 2 weeks for review so further changes can be made to help her blood sugars throughout the day  Check hemoglobin A1c prior to next visit      2   Hypertension/microalbuminuria:  Continue lisinopril   Check comprehensive metabolic panel prior to next visit      3   Hyperlipidemia:  Continue Omega 3 fatty acid fish oil  Check fasting lipid panel prior to next visit  CC:  Type 2 Diabetes follow-up    History of Present Illness     HPI:  75y o  year old female with type 2 diabetes for 18 years   She is on oral agents and insulin at home and takes Metformin 1000 mg twice daily, Levemir insulin 20-22 units at bedtime, and Humalog insulin 4-6 units at breakfast and lunch, 2-3 units in the afternoon, and 5-7 units at supper  Her most recent hemoglobin A1c from January 8, 2020  is 6 4  She presents with no blood sugars or meter for download  She denies any polydipsia, and blurry vision   She has no polyphagia   She has 2 times per night nocturia and polyuria  She ones one episode of hypoglycemia int he past few months  She has no chest pain or shortness of breath  She denies nephropathy, retinopathy, heart attack, stroke and claudication but does admit to neuropathy    She states that she has been dealing with a lot of family stress at home as her  has been recently diagnosed with liver cancer, underwent radiation therapy and is now starting chemotherapy      Her most recent diabetic eye exam was 2019  and no retinopathy  She complains of some numbness and tingling to her feet at times  Her most recent diabetic foot exam was performed on September 15, 2019 and endocrine office visit      Her hypertension is treated with Lasix 40 mg daily and lisinopril 2 5 mg daily        For her hyperlipidemia, she is taking Omega 3 fatty acid fish oil 1000 mg daily        Review of Systems   Constitutional: Negative  Negative for chills, fatigue and fever  HENT: Negative  Negative for trouble swallowing and voice change  Eyes: Negative  Negative for photophobia, pain, discharge, redness, itching and visual disturbance  Respiratory: Negative  Negative for chest tightness and shortness of breath  Cardiovascular: Negative  Negative for chest pain  Gastrointestinal: Negative  Negative for abdominal pain, constipation, diarrhea and vomiting  Endocrine: Positive for polyuria (Attributed to diuretics)  Negative for cold intolerance, heat intolerance, polydipsia and polyphagia  Genitourinary: Negative  Musculoskeletal: Positive for arthralgias ( attributed to arthritis) and myalgias  Skin: Negative  Allergic/Immunologic: Negative  Neurological: Positive for numbness ( in feet at times)  Negative for dizziness, syncope, light-headedness and headaches  Hematological: Negative  Psychiatric/Behavioral: Negative  All other systems reviewed and are negative        Historical Information   Past Medical History:   Diagnosis Date    Asthma     seasonal    Diabetes mellitus ([facility] [address] )     Osteoarthritis     Tremor, hereditary, benign      Past Surgical History:   Procedure Laterality Date     SECTION      3    TOE SURGERY Bilateral     5 th toe bone removed    TOTAL HIP ARTHROPLASTY Right     TUBAL LIGATION Bilateral      Social History   Social History     Substance and Sexual Activity   Alcohol Use Yes    Frequency: 2-4 times a month    Comment: socially     Social History     Substance and Sexual Activity   Drug Use No     Social History     Tobacco Use   Smoking Status Former Smoker    Packs/day: 1 00    Years: 15     Pack years: 15     Types: Cigarettes    Last attempt to quit: 2007    Years since quittin 6   Smokeless Tobacco Never Used     Family History:   Family History   Problem Relation Age of Onset    Cirrhosis Mother     Heart disease Mother     Esophageal varices Mother     Brain cancer Father     Prostate cancer Father     Stroke Father     Heart attack Father     Skin cancer Brother     COPD Brother     Diabetes type II Maternal Aunt     Diabetes type II Maternal Grandfather     Parkinsonism Maternal Grandfather     Coronary artery disease Brother     Prostate cancer Brother     Post-traumatic stress disorder Son     No Known Problems Son     No Known Problems Daughter     Parkinsonism Maternal Aunt     Diabetes type I Cousin        Meds/Allergies   Current Outpatient Medications   Medication Sig Dispense Refill    albuterol (PROVENTIL HFA) 90 mcg/act inhaler Inhale      aspirin 81 MG tablet Take 81 mg by mouth 2 (two) times a day      b complex vitamins capsule Take 1 capsule by mouth daily      BD ULTRA-FINE PEN NEEDLES 29G X 12 7MM MISC Use up to 5 times a day 500 each 3    Biotin 5000 MCG CAPS Take 5,000 capsules by mouth 2 (two) times a day      Calcium Carbonate (CALCIUM 600 PO) Take 2 tablets by mouth daily      docusate sodium (COLACE) 100 mg capsule Take 100 mg by mouth daily       ferrous sulfate 325 (65 Fe) mg tablet Take 325 mg by mouth 2 (two) times a day      folic acid (FOLVITE) 628 mcg tablet Take 800 mcg by mouth daily       furosemide (LASIX) 40 mg tablet Take 40 mg by mouth daily prn      insulin detemir (LEVEMIR FLEXTOUCH) 100 Units/mL injection pen Inject 24 Units under the skin daily at bedtime 10 pen 0    insulin lispro (HumaLOG) 100 units/mL injection pen Use 2-7 units 4 times a day as directed 20 pen 2    lisinopril (ZESTRIL) 2 5 mg tablet TAKE 1 TABLET BY MOUTH  DAILY 90 tablet 2    metFORMIN (GLUCOPHAGE) 1000 MG tablet TAKE 1 TABLET BY MOUTH TWO  TIMES DAILY WITH MEALS 180 tablet 1    Omega-3 Fatty Acids (FISH OIL) 1,000 mg Take 1,000 mg by mouth daily      TRUE METRIX BLOOD GLUCOSE TEST test strip Use as instructed up to 4 times a day 400 each 3    vitamin E, tocopherol, 400 units capsule Take 400 Units by mouth daily      Ascorbic Acid (VITAMIN C) 1000 MG tablet Take 1,000 mg by mouth daily      Calcium Carb-Cholecalciferol (CALCIUM 1000 + D PO) Take by mouth 2 (two) times a day       fluticasone (FLONASE) 50 mcg/act nasal spray 1 spray into each nostril 2 (two) times a day Prn, primarily at hs       No current facility-administered medications for this visit  Allergies   Allergen Reactions    Actos [Pioglitazone] Edema     Severe lower extremity    Gentamicin     Other     Oxycodone GI Intolerance    Sulfa Antibiotics        Objective   Vitals: Blood pressure 144/80, pulse 80, height 5' 7 32" (1 71 m), weight 112 kg (247 lb 6 4 oz)  Physical Exam   Constitutional: She is oriented to person, place, and time  She appears well-developed and well-nourished  Obese   HENT:   Head: Normocephalic and atraumatic  Mouth/Throat: Oropharynx is clear and moist    Eyes: Pupils are equal, round, and reactive to light  Conjunctivae and EOM are normal    Wears glasses   Neck: Normal range of motion  Neck supple  Cardiovascular: Normal rate, regular rhythm, normal heart sounds and intact distal pulses  Pulmonary/Chest: Effort normal and breath sounds normal    Abdominal: Soft  Bowel sounds are normal    Musculoskeletal: Normal range of motion  Neurological: She is alert and oriented to person, place, and time  Skin: Skin is warm and dry  Dry skin   Psychiatric: She has a normal mood and affect  Her behavior is normal  Judgment and thought content normal    Vitals reviewed  Lab Results:   Lab Results   Component Value Date/Time    Hemoglobin A1C 6 4 (H) 01/08/2020 08:34 AM    Hemoglobin A1C 6 6 (H) 08/31/2019 07:47 AM    Hemoglobin A1C 7 5 (H) 05/24/2019 07:42 AM    WBC 5 07 05/24/2019 07:42 AM    Hemoglobin 11 7 05/24/2019 07:42 AM    Hematocrit 36 3 05/24/2019 07:42 AM    MCV 95 05/24/2019 07:42 AM    Platelets 300 27/22/5341 07:42 AM    BUN 15 01/08/2020 08:34 AM    BUN 12 08/31/2019 07:47 AM    BUN 13 05/24/2019 07:42 AM    Potassium 4 0 01/08/2020 08:34 AM    Potassium 4 2 08/31/2019 07:47 AM    Potassium 4 3 05/24/2019 07:42 AM    Chloride 108 01/08/2020 08:34 AM    Chloride 106 08/31/2019 07:47 AM    Chloride 108 05/24/2019 07:42 AM    CO2 31 01/08/2020 08:34 AM    CO2 30 08/31/2019 07:47 AM    CO2 31 05/24/2019 07:42 AM    Creatinine 1 05 01/08/2020 08:34 AM    Creatinine 0 80 08/31/2019 07:47 AM    Creatinine 0 92 05/24/2019 07:42 AM    AST 11 01/08/2020 08:34 AM    AST 10 08/31/2019 07:47 AM    AST 13 05/24/2019 07:42 AM    ALT 17 01/08/2020 08:34 AM    ALT 16 08/31/2019 07:47 AM    ALT 16 05/24/2019 07:42 AM    Albumin 3 5 01/08/2020 08:34 AM    Albumin 3 5 08/31/2019 07:47 AM    Albumin 3 5 05/24/2019 07:42 AM    HDL, Direct 61 (H) 08/31/2019 07:47 AM    Triglycerides 59 08/31/2019 07:47 AM     Portions of the record may have been created with voice recognition software  Occasional wrong word or "sound a like" substitutions may have occurred due to the inherent limitations of voice recognition software  Read the chart carefully and recognize, using context, where substitutions have occurred

## 2020-01-09 NOTE — TELEPHONE ENCOUNTER
01/09/20 10:50 AM     Upon review of the In Basket request and the patient's chart, initial outreach has been made to the external facility via fax to [phone]  A second outreach attempt will be made within 3 business days      Thank you  Kerry Osorio

## 2020-01-09 NOTE — LETTER
Diabetic Eye Exam Form    Date Requested: 20  Patient: Grant Slaughter  Patient : 1955   Referring Provider: Ron Rivas DO    Dilated Retinal Exam        Yes         No     Date of Diabetic Eye Exam ______________________________  Left Eye      Exam did show retinopathy    Exam did not show retinopathy         Mild       Moderate       None       Proliferative       Severe     Right Eye     Exam did show retinopathy    Exam did not show retinopathy         Mild       Moderate       None       Proliferative       Severe     Comments __________________________________________________________    Practice Providing Exam ______________________________________________    Exam Performed By (print name) _______________________________________      Provider Signature ___________________________________________________      These reports are needed for  compliance    Please fax this completed form and a copy of the Diabetic Eye Exam report to our office as soon as possible to [phone] gonzalo Ledesma: Phone [phone]    We thank you for your assistance in treating our mutual patient

## 2020-01-19 DIAGNOSIS — R80.9 MICROALBUMINURIA: ICD-10-CM

## 2020-01-19 DIAGNOSIS — I10 ESSENTIAL HYPERTENSION: ICD-10-CM

## 2020-01-21 RX ORDER — LISINOPRIL 2.5 MG/1
TABLET ORAL
Qty: 90 TABLET | Refills: 0 | Status: SHIPPED | OUTPATIENT
Start: 2020-01-21 | End: 2020-04-14

## 2020-02-10 ENCOUNTER — TELEPHONE (OUTPATIENT)
Dept: ENDOCRINOLOGY | Facility: HOSPITAL | Age: 65
End: 2020-02-10

## 2020-02-25 DIAGNOSIS — Z79.4 TYPE 2 DIABETES MELLITUS WITH DIABETIC NEUROPATHY, WITH LONG-TERM CURRENT USE OF INSULIN (HCC): ICD-10-CM

## 2020-02-25 DIAGNOSIS — E11.40 TYPE 2 DIABETES MELLITUS WITH DIABETIC NEUROPATHY, WITH LONG-TERM CURRENT USE OF INSULIN (HCC): ICD-10-CM

## 2020-02-25 RX ORDER — PEN NEEDLE, DIABETIC 29 G X1/2"
NEEDLE, DISPOSABLE MISCELLANEOUS
Qty: 450 EACH | Refills: 3 | Status: SHIPPED | OUTPATIENT
Start: 2020-02-25 | End: 2021-05-11 | Stop reason: SDUPTHER

## 2020-04-14 DIAGNOSIS — I10 ESSENTIAL HYPERTENSION: ICD-10-CM

## 2020-04-14 DIAGNOSIS — R80.9 MICROALBUMINURIA: ICD-10-CM

## 2020-04-14 RX ORDER — LISINOPRIL 2.5 MG/1
TABLET ORAL
Qty: 90 TABLET | Refills: 0 | Status: SHIPPED | OUTPATIENT
Start: 2020-04-14 | End: 2020-07-21

## 2020-04-16 ENCOUNTER — TELEMEDICINE (OUTPATIENT)
Dept: ENDOCRINOLOGY | Facility: HOSPITAL | Age: 65
End: 2020-04-16
Payer: MEDICARE

## 2020-04-16 VITALS — WEIGHT: 252 LBS | BODY MASS INDEX: 39.09 KG/M2

## 2020-04-16 DIAGNOSIS — E11.29 MICROALBUMINURIA DUE TO TYPE 2 DIABETES MELLITUS (HCC): ICD-10-CM

## 2020-04-16 DIAGNOSIS — E11.40 TYPE 2 DIABETES MELLITUS WITH DIABETIC NEUROPATHY, WITH LONG-TERM CURRENT USE OF INSULIN (HCC): Primary | ICD-10-CM

## 2020-04-16 DIAGNOSIS — R80.9 MICROALBUMINURIA DUE TO TYPE 2 DIABETES MELLITUS (HCC): ICD-10-CM

## 2020-04-16 DIAGNOSIS — I10 ESSENTIAL HYPERTENSION: ICD-10-CM

## 2020-04-16 DIAGNOSIS — E78.2 MIXED HYPERLIPIDEMIA: ICD-10-CM

## 2020-04-16 DIAGNOSIS — I89.0 LYMPHEDEMA: ICD-10-CM

## 2020-04-16 DIAGNOSIS — Z79.4 TYPE 2 DIABETES MELLITUS WITH DIABETIC NEUROPATHY, WITH LONG-TERM CURRENT USE OF INSULIN (HCC): Primary | ICD-10-CM

## 2020-04-16 PROCEDURE — 99214 OFFICE O/P EST MOD 30 MIN: CPT | Performed by: INTERNAL MEDICINE

## 2020-04-16 RX ORDER — FUROSEMIDE 40 MG/1
40 TABLET ORAL DAILY
Qty: 90 TABLET | Refills: 3 | Status: SHIPPED | OUTPATIENT
Start: 2020-04-16 | End: 2021-06-09

## 2020-04-27 DIAGNOSIS — Z79.4 TYPE 2 DIABETES MELLITUS WITH DIABETIC NEUROPATHY, WITH LONG-TERM CURRENT USE OF INSULIN (HCC): ICD-10-CM

## 2020-04-27 DIAGNOSIS — E11.40 TYPE 2 DIABETES MELLITUS WITH DIABETIC NEUROPATHY, WITH LONG-TERM CURRENT USE OF INSULIN (HCC): ICD-10-CM

## 2020-05-17 DIAGNOSIS — E11.40 TYPE 2 DIABETES MELLITUS WITH DIABETIC NEUROPATHY, WITH LONG-TERM CURRENT USE OF INSULIN (HCC): ICD-10-CM

## 2020-05-17 DIAGNOSIS — Z79.4 TYPE 2 DIABETES MELLITUS WITH DIABETIC NEUROPATHY, WITH LONG-TERM CURRENT USE OF INSULIN (HCC): ICD-10-CM

## 2020-05-19 RX ORDER — INSULIN LISPRO 100 [IU]/ML
INJECTION, SOLUTION INTRAVENOUS; SUBCUTANEOUS
Qty: 30 ML | Refills: 4 | Status: SHIPPED | OUTPATIENT
Start: 2020-05-19 | End: 2021-05-11 | Stop reason: SDUPTHER

## 2020-07-16 ENCOUNTER — LAB (OUTPATIENT)
Dept: LAB | Facility: CLINIC | Age: 65
End: 2020-07-16
Payer: MEDICARE

## 2020-07-16 DIAGNOSIS — E78.2 MIXED HYPERLIPIDEMIA: ICD-10-CM

## 2020-07-16 DIAGNOSIS — I89.0 LYMPHEDEMA: ICD-10-CM

## 2020-07-16 DIAGNOSIS — R80.9 MICROALBUMINURIA DUE TO TYPE 2 DIABETES MELLITUS (HCC): ICD-10-CM

## 2020-07-16 DIAGNOSIS — E11.40 TYPE 2 DIABETES MELLITUS WITH DIABETIC NEUROPATHY, WITH LONG-TERM CURRENT USE OF INSULIN (HCC): ICD-10-CM

## 2020-07-16 DIAGNOSIS — Z79.4 TYPE 2 DIABETES MELLITUS WITH DIABETIC NEUROPATHY, WITH LONG-TERM CURRENT USE OF INSULIN (HCC): ICD-10-CM

## 2020-07-16 DIAGNOSIS — I10 ESSENTIAL HYPERTENSION: ICD-10-CM

## 2020-07-16 DIAGNOSIS — E11.29 MICROALBUMINURIA DUE TO TYPE 2 DIABETES MELLITUS (HCC): ICD-10-CM

## 2020-07-16 LAB
EST. AVERAGE GLUCOSE BLD GHB EST-MCNC: 157 MG/DL
HBA1C MFR BLD: 7.1 %

## 2020-07-16 PROCEDURE — 36415 COLL VENOUS BLD VENIPUNCTURE: CPT

## 2020-07-16 PROCEDURE — 84443 ASSAY THYROID STIM HORMONE: CPT

## 2020-07-16 PROCEDURE — 80053 COMPREHEN METABOLIC PANEL: CPT

## 2020-07-16 PROCEDURE — 83036 HEMOGLOBIN GLYCOSYLATED A1C: CPT

## 2020-07-16 PROCEDURE — 80061 LIPID PANEL: CPT

## 2020-07-17 LAB
ALBUMIN SERPL BCP-MCNC: 3.5 G/DL (ref 3.5–5)
ALP SERPL-CCNC: 64 U/L (ref 46–116)
ALT SERPL W P-5'-P-CCNC: 17 U/L (ref 12–78)
ANION GAP SERPL CALCULATED.3IONS-SCNC: 6 MMOL/L (ref 4–13)
AST SERPL W P-5'-P-CCNC: 14 U/L (ref 5–45)
BILIRUB SERPL-MCNC: 0.37 MG/DL (ref 0.2–1)
BUN SERPL-MCNC: 24 MG/DL (ref 5–25)
CALCIUM SERPL-MCNC: 8.9 MG/DL (ref 8.3–10.1)
CHLORIDE SERPL-SCNC: 104 MMOL/L (ref 100–108)
CHOLEST SERPL-MCNC: 169 MG/DL (ref 50–200)
CO2 SERPL-SCNC: 29 MMOL/L (ref 21–32)
CREAT SERPL-MCNC: 0.97 MG/DL (ref 0.6–1.3)
GFR SERPL CREATININE-BSD FRML MDRD: 61 ML/MIN/1.73SQ M
GLUCOSE P FAST SERPL-MCNC: 130 MG/DL (ref 65–99)
HDLC SERPL-MCNC: 61 MG/DL
LDLC SERPL CALC-MCNC: 94 MG/DL (ref 0–100)
NONHDLC SERPL-MCNC: 108 MG/DL
POTASSIUM SERPL-SCNC: 4.4 MMOL/L (ref 3.5–5.3)
PROT SERPL-MCNC: 6.4 G/DL (ref 6.4–8.2)
SODIUM SERPL-SCNC: 139 MMOL/L (ref 136–145)
TRIGL SERPL-MCNC: 72 MG/DL
TSH SERPL DL<=0.05 MIU/L-ACNC: 2.77 UIU/ML (ref 0.36–3.74)

## 2020-07-18 DIAGNOSIS — I10 ESSENTIAL HYPERTENSION: ICD-10-CM

## 2020-07-18 DIAGNOSIS — R80.9 MICROALBUMINURIA: ICD-10-CM

## 2020-07-21 RX ORDER — LISINOPRIL 2.5 MG/1
TABLET ORAL
Qty: 90 TABLET | Refills: 0 | Status: SHIPPED | OUTPATIENT
Start: 2020-07-21 | End: 2020-11-23

## 2020-07-27 ENCOUNTER — OFFICE VISIT (OUTPATIENT)
Dept: ENDOCRINOLOGY | Facility: HOSPITAL | Age: 65
End: 2020-07-27
Payer: MEDICARE

## 2020-07-27 VITALS
WEIGHT: 241.8 LBS | BODY MASS INDEX: 37.95 KG/M2 | HEIGHT: 67 IN | TEMPERATURE: 97.5 F | SYSTOLIC BLOOD PRESSURE: 140 MMHG | HEART RATE: 76 BPM | DIASTOLIC BLOOD PRESSURE: 78 MMHG

## 2020-07-27 DIAGNOSIS — R80.9 MICROALBUMINURIA DUE TO TYPE 2 DIABETES MELLITUS (HCC): ICD-10-CM

## 2020-07-27 DIAGNOSIS — E78.2 MIXED HYPERLIPIDEMIA: ICD-10-CM

## 2020-07-27 DIAGNOSIS — Z79.4 TYPE 2 DIABETES MELLITUS WITH DIABETIC NEUROPATHY, WITH LONG-TERM CURRENT USE OF INSULIN (HCC): Primary | ICD-10-CM

## 2020-07-27 DIAGNOSIS — I10 ESSENTIAL HYPERTENSION: ICD-10-CM

## 2020-07-27 DIAGNOSIS — E11.40 TYPE 2 DIABETES MELLITUS WITH DIABETIC NEUROPATHY, WITH LONG-TERM CURRENT USE OF INSULIN (HCC): Primary | ICD-10-CM

## 2020-07-27 DIAGNOSIS — E11.29 MICROALBUMINURIA DUE TO TYPE 2 DIABETES MELLITUS (HCC): ICD-10-CM

## 2020-07-27 PROCEDURE — 99215 OFFICE O/P EST HI 40 MIN: CPT | Performed by: INTERNAL MEDICINE

## 2020-07-27 NOTE — PROGRESS NOTES
7/27/2020    Assessment/Plan      Diagnoses and all orders for this visit:    Type 2 diabetes mellitus with diabetic neuropathy, with long-term current use of insulin ([facility] )  -     HEMOGLOBIN A1C W/ EAG ESTIMATION Lab Collect; Future  -     Comprehensive metabolic panel Lab Collect; Future  -     CBC and differential Lab Collect; Future  -     Microalbumin / creatinine urine ratio Lab Collect; Future  -     Discontinue: insulin detemir (LEVEMIR FLEXTOUCH) 100 Units/mL injection pen; Inject 18-20 Units under the skin daily at bedtime  -     insulin detemir (LEVEMIR FLEXTOUCH) 100 Units/mL injection pen; Inject 18-20 Units under the skin daily at bedtime    Microalbuminuria due to type 2 diabetes mellitus ([facility] )  -     HEMOGLOBIN A1C W/ EAG ESTIMATION Lab Collect; Future  -     Comprehensive metabolic panel Lab Collect; Future  -     CBC and differential Lab Collect; Future  -     Microalbumin / creatinine urine ratio Lab Collect; Future    Essential hypertension  -     HEMOGLOBIN A1C W/ EAG ESTIMATION Lab Collect; Future  -     Comprehensive metabolic panel Lab Collect; Future  -     CBC and differential Lab Collect; Future  -     Microalbumin / creatinine urine ratio Lab Collect; Future    Mixed hyperlipidemia  -     HEMOGLOBIN A1C W/ EAG ESTIMATION Lab Collect; Future  -     Comprehensive metabolic panel Lab Collect; Future  -     CBC and differential Lab Collect; Future  -     Microalbumin / creatinine urine ratio Lab Collect; Future        Assessment/Plan:  1  Type 2 diabetes, insulin requiring  Most recent hemoglobin A1c is 7 1%  This is a bit higher than last visit but not bad  It may be due to a lot of the stress she was under over the last several months with her ill   For now, she will continue the same insulin dosages and continue to test her blood sugars 3 to 4 times a day  She will continue the same metformin  2  Diabetic neuropathy  She has chronic unchanged neuropathic symptoms    Diabetic foot exam was performed in the office today  3  Diabetic microalbuminuria  She will continue the same lisinopril 2 5 mg daily I will repeat a urine microalbumin to creatinine ratio with her next visit  4  Hypertension  Blood pressure is under fair control on her current dose of lisinopril and furosemide  5  Hyperlipidemia  Most recent lipid profile is quite good on no medications  I have asked her to follow up in 3 months with preceding hemoglobin A1c, CMP, CBC, and urine microalbumin to creatinine ratio  CC: Diabetes type 2, blood pressure, lipid follow-up    History of Present Illness     HPI: Sanjuana Duncan is a 75y o  year old female with type 2 diabetes, insulin requiring with neuropathy and microalbuminuria for 19 years, hypertension, hyperlipidemia, lymphedema for follow-up visit  She is on oral agents and insulin at home and takes Levemir insulin 18-20 units at bedtime, Humalog insulin 2-7 units up to 4 times a day, 2-3 in the afternoon with snack, metformin 1000 mg twice a day  She denies any polyuria, polydipsia, polyphagia, and blurry vision  She has nocturia every 2-3 hours  She has chronic unchanged numbness and tingling of her feet  She denies chest pain or shortness of breath  She denies retinopathy, heart attack, stroke and claudication but does admit to neuropathy and nephropathy  Her   of metastatic cancer/pneumonia last month  Hypoglycemic episodes: Yes infrequent  Will have low blood sugars in am if not enough pm carbs  The patient's last eye exam was in 2019 with no retinopathy  The patient's last foot exam was in 2019 at the endocrine office visit  She plans to follow up with the podiatist  Last A1C was   Lab Results   Component Value Date    HGBA1C 7 1 (H) 2020     Blood Sugar/Glucometer/Pump/CGM review: checks blood sugars 3-4 times a day  Mostly 100s, can be up to 200s after lunch if forgot insulin dose    Before breakfast:     She has lymphedema and takes furosemide 40 mg daily as needed  She has unchanged lower extremity edema  She has hypertension and takes lisinopril 2 5 mg daily and furosemide 40 mg daily as needed  She denies headache or stroke-like symptoms  She has hyperlipidemia and takes fish oil 1000 mg daily  She denies chest pain or shortness of breath  Review of Systems   Constitutional: Positive for fatigue  Negative for unexpected weight change  Weight 11 lbs less than 2020  HENT: Negative for trouble swallowing  Eyes: Negative for visual disturbance  Respiratory: Negative for chest tightness and shortness of breath  Cardiovascular: Positive for leg swelling  Negative for chest pain  Gastrointestinal: Negative for abdominal pain, constipation, diarrhea and nausea  Endocrine: Negative for polydipsia, polyphagia and polyuria  Nocturia every 2-3 hours  Musculoskeletal: Positive for arthralgias  Left knee pain, for cortisone injection tomorrow, last one 2020  Skin: Negative for wound  Neurological: Positive for numbness  Negative for dizziness, weakness, light-headedness and headaches  Unchanged numbness and tingling of the feet  Right 2nd toe broken after wheelchair fell on it  Psychiatric/Behavioral: Negative for sleep disturbance         Historical Information   Past Medical History:   Diagnosis Date    Asthma     seasonal    Diabetes mellitus ([facility] [address] )     Osteoarthritis     Tremor, hereditary, benign      Past Surgical History:   Procedure Laterality Date     SECTION      3    TOE SURGERY Bilateral     5 th toe bone removed    TOTAL HIP ARTHROPLASTY Right     TUBAL LIGATION Bilateral      Social History   Social History     Substance and Sexual Activity   Alcohol Use Yes    Frequency: 2-4 times a month    Comment: socially     Social History     Substance and Sexual Activity   Drug Use No     Social History     Tobacco Use   Smoking Status Former Smoker    Packs/day: 1 00    Years: 15 00    Pack years: 15 00    Types: Cigarettes    Last attempt to quit: 2007    Years since quittin 1   Smokeless Tobacco Never Used     Family History:   Family History   Problem Relation Age of Onset    Cirrhosis Mother     Heart disease Mother     Esophageal varices Mother     Brain cancer Father     Prostate cancer Father     Stroke Father     Heart attack Father     Skin cancer Brother     COPD Brother     Diabetes type II Maternal Aunt     Diabetes type II Maternal Grandfather     Parkinsonism Maternal Grandfather     Coronary artery disease Brother     Prostate cancer Brother     Post-traumatic stress disorder Son     No Known Problems Son     No Known Problems Daughter     Parkinsonism Maternal Aunt     Diabetes type I Cousin        Meds/Allergies   Current Outpatient Medications   Medication Sig Dispense Refill    albuterol (PROVENTIL HFA) 90 mcg/act inhaler Inhale      aspirin 81 MG tablet Take 81 mg by mouth 2 (two) times a day      b complex vitamins capsule Take 1 capsule by mouth daily      BD ULTRA-FINE PEN NEEDLES 29G X 12 7MM MISC USE UP TO 5 TIMES A  each 3    Biotin 5000 MCG CAPS Take 5,000 capsules by mouth 2 (two) times a day      Calcium Carbonate (CALCIUM 600 PO) Take 2 tablets by mouth daily With vitamin d      docusate sodium (COLACE) 100 mg capsule Take 100 mg by mouth daily       ferrous sulfate 325 (65 Fe) mg tablet Take 325 mg by mouth 2 (two) times a day      folic acid (FOLVITE) 984 mcg tablet Take 800 mcg by mouth daily       furosemide (Lasix) 40 mg tablet Take 1 tablet (40 mg total) by mouth daily prn 90 tablet 3    insulin detemir (LEVEMIR FLEXTOUCH) 100 Units/mL injection pen Inject 18-20 Units under the skin daily at bedtime 10 pen 6    insulin lispro (HumaLOG KwikPen) 100 units/mL injection pen INJECT SUBCUTANEOUSLY 2-7  UNITS 4 TIMES DAILY AS  DIRECTED 30 mL 4  lisinopril (ZESTRIL) 2 5 mg tablet TAKE 1 TABLET BY MOUTH  DAILY 90 tablet 0    metFORMIN (GLUCOPHAGE) 1000 MG tablet Take 1 tablet (1,000 mg total) by mouth 2 (two) times a day with meals 180 tablet 1    Omega-3 Fatty Acids (FISH OIL) 1,000 mg Take 1,000 mg by mouth daily      TRUE METRIX BLOOD GLUCOSE TEST test strip Use as instructed up to 4 times a day 400 each 3    vitamin E, tocopherol, 400 units capsule Take 400 Units by mouth daily       No current facility-administered medications for this visit  Allergies   Allergen Reactions    Actos [Pioglitazone] Edema     Severe lower extremity    Gentamicin     Other     Oxycodone GI Intolerance    Sulfa Antibiotics        Objective   Vitals: Blood pressure 140/78, pulse 76, temperature 97 5 °F (36 4 °C), height 5' 7 32" (1 71 m), weight 110 kg (241 lb 12 8 oz)  Invasive Devices     None                 Physical Exam   Constitutional: She is oriented to person, place, and time  She appears well-developed and well-nourished  HENT:   Head: Normocephalic and atraumatic  Eyes: Conjunctivae and EOM are normal    Neck: Normal range of motion  Neck supple  No thyromegaly present  Thyroid normal in size  No carotid bruits  Cardiovascular: Normal rate, regular rhythm, normal heart sounds and intact distal pulses  Pulses are no weak pulses  No murmur heard  Pulses:       Dorsalis pedis pulses are 2+ on the right side, and 2+ on the left side  Posterior tibial pulses are 2+ on the right side, and 2+ on the left side  Pulmonary/Chest: Effort normal and breath sounds normal  She has no wheezes  Abdominal: Soft  Bowel sounds are normal  There is no tenderness  Musculoskeletal: Normal range of motion  She exhibits edema and deformity  Callus medial 1st toe  Chronic lymphedema  No ulcerations of the feet  Feet:   Right Foot:   Skin Integrity: Negative for ulcer, skin breakdown, erythema, warmth, callus or dry skin     Left Foot:   Skin Integrity: Negative for ulcer, skin breakdown, erythema, warmth, callus or dry skin  Lymphadenopathy:     She has no cervical adenopathy  Neurological: She is alert and oriented to person, place, and time  She has normal reflexes  vibration sensation diminished to the 1st toe DIP joint bilaterally  Microfilament sensation intact bilateral except to the left 1st toe  Skin: Skin is warm and dry  No rash noted  Vitals reviewed  Patient's shoes and socks removed  Right Foot/Ankle   Right Foot Inspection  Skin Exam: skin normal and skin intact no dry skin, no warmth, no callus, no erythema, no maceration, no abnormal color, no pre-ulcer, no ulcer and no callus                          Toe Exam: ROM and strength within normal limits and swelling  Sensory   Vibration: diminished    Monofilament testing: intact  Vascular  Capillary refills: < 3 seconds  The right DP pulse is 2+  The right PT pulse is 2+  Left Foot/Ankle  Left Foot Inspection  Skin Exam: skin normal and skin intactno dry skin, no warmth, no erythema, no maceration, normal color, no pre-ulcer, no ulcer and no callus                         Toe Exam: ROM and strength within normal limits and swelling                   Sensory   Vibration: diminished    Monofilament: intact  Vascular  Capillary refills: < 3 seconds  The left DP pulse is 2+  The left PT pulse is 2+  Assign Risk Category:  No deformity present; Loss of protective sensation; No weak pulses       Risk: 1        The history was obtained from the review of the chart and from the patient      Lab Results:    Most recent Alc is  Lab Results   Component Value Date    HGBA1C 7 1 (H) 07/16/2020           CMP shows a glucose of 130 fasting but was otherwise normal   Lab Results   Component Value Date    CREATININE 0 97 07/16/2020    CREATININE 1 05 01/08/2020    CREATININE 0 80 08/31/2019    BUN 24 07/16/2020     11/10/2015    K 4 4 07/16/2020     07/16/2020    CO2 29 07/16/2020 eGFR   Date Value Ref Range Status   07/16/2020 61 ml/min/1 73sq m Final       Lab Results   Component Value Date    ALT 17 07/16/2020    AST 14 07/16/2020    ALKPHOS 64 07/16/2020    BILITOT 0 42 11/10/2015     Total cholesterol 169, triglyceride 72, HDL 61, LDL 94  TSH is 2 77      Future Appointments   Date Time Provider Hafsa Calvin   11/2/2020 11:40 AM Dennis Hoffman MD [facility]

## 2020-07-27 NOTE — PATIENT INSTRUCTIONS
hgba1c is 7 1%  Th is a bit higher but not bad  No change in insulin for now  Continue to test blood sugars 3-4 times a day  Follow up in 3 months with blood work

## 2020-10-29 ENCOUNTER — LAB (OUTPATIENT)
Dept: LAB | Facility: CLINIC | Age: 65
End: 2020-10-29
Payer: MEDICARE

## 2020-10-29 DIAGNOSIS — E11.29 MICROALBUMINURIA DUE TO TYPE 2 DIABETES MELLITUS (HCC): ICD-10-CM

## 2020-10-29 DIAGNOSIS — I10 ESSENTIAL HYPERTENSION: ICD-10-CM

## 2020-10-29 DIAGNOSIS — E78.2 MIXED HYPERLIPIDEMIA: ICD-10-CM

## 2020-10-29 DIAGNOSIS — R80.9 MICROALBUMINURIA DUE TO TYPE 2 DIABETES MELLITUS (HCC): ICD-10-CM

## 2020-10-29 DIAGNOSIS — E11.40 TYPE 2 DIABETES MELLITUS WITH DIABETIC NEUROPATHY, WITH LONG-TERM CURRENT USE OF INSULIN (HCC): ICD-10-CM

## 2020-10-29 DIAGNOSIS — Z79.4 TYPE 2 DIABETES MELLITUS WITH DIABETIC NEUROPATHY, WITH LONG-TERM CURRENT USE OF INSULIN (HCC): ICD-10-CM

## 2020-10-29 LAB
ALBUMIN SERPL BCP-MCNC: 3.5 G/DL (ref 3.5–5)
ALP SERPL-CCNC: 58 U/L (ref 46–116)
ALT SERPL W P-5'-P-CCNC: 18 U/L (ref 12–78)
ANION GAP SERPL CALCULATED.3IONS-SCNC: 6 MMOL/L (ref 4–13)
AST SERPL W P-5'-P-CCNC: 17 U/L (ref 5–45)
BASOPHILS # BLD AUTO: 0.03 THOUSANDS/ΜL (ref 0–0.1)
BASOPHILS NFR BLD AUTO: 1 % (ref 0–1)
BILIRUB SERPL-MCNC: 0.38 MG/DL (ref 0.2–1)
BUN SERPL-MCNC: 12 MG/DL (ref 5–25)
CALCIUM SERPL-MCNC: 9.3 MG/DL (ref 8.3–10.1)
CHLORIDE SERPL-SCNC: 105 MMOL/L (ref 100–108)
CO2 SERPL-SCNC: 29 MMOL/L (ref 21–32)
CREAT SERPL-MCNC: 0.95 MG/DL (ref 0.6–1.3)
CREAT UR-MCNC: 67.7 MG/DL
EOSINOPHIL # BLD AUTO: 0.53 THOUSAND/ΜL (ref 0–0.61)
EOSINOPHIL NFR BLD AUTO: 8 % (ref 0–6)
ERYTHROCYTE [DISTWIDTH] IN BLOOD BY AUTOMATED COUNT: 11.9 % (ref 11.6–15.1)
GFR SERPL CREATININE-BSD FRML MDRD: 62 ML/MIN/1.73SQ M
GLUCOSE SERPL-MCNC: 72 MG/DL (ref 65–140)
HCT VFR BLD AUTO: 35.7 % (ref 34.8–46.1)
HGB BLD-MCNC: 11.4 G/DL (ref 11.5–15.4)
IMM GRANULOCYTES # BLD AUTO: 0.01 THOUSAND/UL (ref 0–0.2)
IMM GRANULOCYTES NFR BLD AUTO: 0 % (ref 0–2)
LYMPHOCYTES # BLD AUTO: 2.18 THOUSANDS/ΜL (ref 0.6–4.47)
LYMPHOCYTES NFR BLD AUTO: 33 % (ref 14–44)
MCH RBC QN AUTO: 31 PG (ref 26.8–34.3)
MCHC RBC AUTO-ENTMCNC: 31.9 G/DL (ref 31.4–37.4)
MCV RBC AUTO: 97 FL (ref 82–98)
MICROALBUMIN UR-MCNC: 5.3 MG/L (ref 0–20)
MICROALBUMIN/CREAT 24H UR: 8 MG/G CREATININE (ref 0–30)
MONOCYTES # BLD AUTO: 0.62 THOUSAND/ΜL (ref 0.17–1.22)
MONOCYTES NFR BLD AUTO: 9 % (ref 4–12)
NEUTROPHILS # BLD AUTO: 3.22 THOUSANDS/ΜL (ref 1.85–7.62)
NEUTS SEG NFR BLD AUTO: 49 % (ref 43–75)
NRBC BLD AUTO-RTO: 0 /100 WBCS
PLATELET # BLD AUTO: 289 THOUSANDS/UL (ref 149–390)
PMV BLD AUTO: 10 FL (ref 8.9–12.7)
POTASSIUM SERPL-SCNC: 4.4 MMOL/L (ref 3.5–5.3)
PROT SERPL-MCNC: 6.8 G/DL (ref 6.4–8.2)
RBC # BLD AUTO: 3.68 MILLION/UL (ref 3.81–5.12)
SODIUM SERPL-SCNC: 140 MMOL/L (ref 136–145)
WBC # BLD AUTO: 6.59 THOUSAND/UL (ref 4.31–10.16)

## 2020-10-29 PROCEDURE — 36415 COLL VENOUS BLD VENIPUNCTURE: CPT

## 2020-10-29 PROCEDURE — 80053 COMPREHEN METABOLIC PANEL: CPT

## 2020-10-29 PROCEDURE — 85025 COMPLETE CBC W/AUTO DIFF WBC: CPT

## 2020-10-29 PROCEDURE — 83036 HEMOGLOBIN GLYCOSYLATED A1C: CPT

## 2020-10-29 PROCEDURE — 82043 UR ALBUMIN QUANTITATIVE: CPT

## 2020-10-29 PROCEDURE — 82570 ASSAY OF URINE CREATININE: CPT

## 2020-10-30 LAB
EST. AVERAGE GLUCOSE BLD GHB EST-MCNC: 131 MG/DL
HBA1C MFR BLD: 6.2 %

## 2020-11-02 ENCOUNTER — OFFICE VISIT (OUTPATIENT)
Dept: ENDOCRINOLOGY | Facility: HOSPITAL | Age: 65
End: 2020-11-02
Payer: MEDICARE

## 2020-11-02 VITALS
WEIGHT: 252.4 LBS | DIASTOLIC BLOOD PRESSURE: 80 MMHG | HEIGHT: 67 IN | HEART RATE: 84 BPM | SYSTOLIC BLOOD PRESSURE: 132 MMHG | TEMPERATURE: 97.8 F | BODY MASS INDEX: 39.62 KG/M2

## 2020-11-02 DIAGNOSIS — E11.29 MICROALBUMINURIA DUE TO TYPE 2 DIABETES MELLITUS (HCC): ICD-10-CM

## 2020-11-02 DIAGNOSIS — E11.40 TYPE 2 DIABETES MELLITUS WITH DIABETIC NEUROPATHY, WITH LONG-TERM CURRENT USE OF INSULIN (HCC): Primary | ICD-10-CM

## 2020-11-02 DIAGNOSIS — R80.9 MICROALBUMINURIA DUE TO TYPE 2 DIABETES MELLITUS (HCC): ICD-10-CM

## 2020-11-02 DIAGNOSIS — Z79.4 TYPE 2 DIABETES MELLITUS WITH DIABETIC NEUROPATHY, WITH LONG-TERM CURRENT USE OF INSULIN (HCC): Primary | ICD-10-CM

## 2020-11-02 DIAGNOSIS — E78.2 MIXED HYPERLIPIDEMIA: ICD-10-CM

## 2020-11-02 DIAGNOSIS — I10 ESSENTIAL HYPERTENSION: ICD-10-CM

## 2020-11-02 PROCEDURE — 99215 OFFICE O/P EST HI 40 MIN: CPT | Performed by: INTERNAL MEDICINE

## 2020-11-07 DIAGNOSIS — Z79.4 TYPE 2 DIABETES MELLITUS WITH DIABETIC NEUROPATHY, WITH LONG-TERM CURRENT USE OF INSULIN (HCC): ICD-10-CM

## 2020-11-07 DIAGNOSIS — E11.40 TYPE 2 DIABETES MELLITUS WITH DIABETIC NEUROPATHY, WITH LONG-TERM CURRENT USE OF INSULIN (HCC): ICD-10-CM

## 2020-11-20 DIAGNOSIS — I10 ESSENTIAL HYPERTENSION: ICD-10-CM

## 2020-11-20 DIAGNOSIS — R80.9 MICROALBUMINURIA: ICD-10-CM

## 2020-11-23 RX ORDER — LISINOPRIL 2.5 MG/1
TABLET ORAL
Qty: 90 TABLET | Refills: 0 | Status: SHIPPED | OUTPATIENT
Start: 2020-11-23 | End: 2021-04-14 | Stop reason: SDUPTHER

## 2020-12-02 LAB
LEFT EYE DIABETIC RETINOPATHY: NORMAL
RIGHT EYE DIABETIC RETINOPATHY: NORMAL

## 2020-12-31 ENCOUNTER — TRANSCRIBE ORDERS (OUTPATIENT)
Dept: ADMINISTRATIVE | Facility: HOSPITAL | Age: 65
End: 2020-12-31

## 2020-12-31 DIAGNOSIS — M81.0 OSTEOPOROSIS, POST-MENOPAUSAL: Primary | ICD-10-CM

## 2021-01-19 ENCOUNTER — HOSPITAL ENCOUNTER (OUTPATIENT)
Dept: BONE DENSITY | Facility: CLINIC | Age: 66
Discharge: HOME | End: 2021-01-19
Payer: MEDICARE

## 2021-01-19 DIAGNOSIS — M81.0 OSTEOPOROSIS, POST-MENOPAUSAL: ICD-10-CM

## 2021-01-19 PROCEDURE — 77080 DXA BONE DENSITY AXIAL: CPT

## 2021-01-27 ENCOUNTER — APPOINTMENT (OUTPATIENT)
Dept: LAB | Facility: CLINIC | Age: 66
End: 2021-01-27
Payer: MEDICARE

## 2021-01-27 DIAGNOSIS — R80.9 MICROALBUMINURIA DUE TO TYPE 2 DIABETES MELLITUS (HCC): ICD-10-CM

## 2021-01-27 DIAGNOSIS — I10 ESSENTIAL HYPERTENSION: ICD-10-CM

## 2021-01-27 DIAGNOSIS — E11.40 TYPE 2 DIABETES MELLITUS WITH DIABETIC NEUROPATHY, WITH LONG-TERM CURRENT USE OF INSULIN (HCC): ICD-10-CM

## 2021-01-27 DIAGNOSIS — Z79.4 TYPE 2 DIABETES MELLITUS WITH DIABETIC NEUROPATHY, WITH LONG-TERM CURRENT USE OF INSULIN (HCC): ICD-10-CM

## 2021-01-27 DIAGNOSIS — E78.2 MIXED HYPERLIPIDEMIA: ICD-10-CM

## 2021-01-27 DIAGNOSIS — E11.29 MICROALBUMINURIA DUE TO TYPE 2 DIABETES MELLITUS (HCC): ICD-10-CM

## 2021-01-27 LAB
ALBUMIN SERPL BCP-MCNC: 3.7 G/DL (ref 3.5–5)
ALP SERPL-CCNC: 55 U/L (ref 46–116)
ALT SERPL W P-5'-P-CCNC: 17 U/L (ref 12–78)
ANION GAP SERPL CALCULATED.3IONS-SCNC: 3 MMOL/L (ref 4–13)
AST SERPL W P-5'-P-CCNC: 15 U/L (ref 5–45)
BILIRUB SERPL-MCNC: 0.44 MG/DL (ref 0.2–1)
BUN SERPL-MCNC: 17 MG/DL (ref 5–25)
CALCIUM SERPL-MCNC: 9.7 MG/DL (ref 8.3–10.1)
CHLORIDE SERPL-SCNC: 107 MMOL/L (ref 100–108)
CO2 SERPL-SCNC: 31 MMOL/L (ref 21–32)
CREAT SERPL-MCNC: 1.01 MG/DL (ref 0.6–1.3)
EST. AVERAGE GLUCOSE BLD GHB EST-MCNC: 137 MG/DL
GFR SERPL CREATININE-BSD FRML MDRD: 58 ML/MIN/1.73SQ M
GLUCOSE P FAST SERPL-MCNC: 109 MG/DL (ref 65–99)
HBA1C MFR BLD: 6.4 %
POTASSIUM SERPL-SCNC: 4.3 MMOL/L (ref 3.5–5.3)
PROT SERPL-MCNC: 6.7 G/DL (ref 6.4–8.2)
SODIUM SERPL-SCNC: 141 MMOL/L (ref 136–145)

## 2021-01-27 PROCEDURE — 36415 COLL VENOUS BLD VENIPUNCTURE: CPT

## 2021-01-27 PROCEDURE — 83036 HEMOGLOBIN GLYCOSYLATED A1C: CPT

## 2021-01-27 PROCEDURE — 80053 COMPREHEN METABOLIC PANEL: CPT

## 2021-02-09 ENCOUNTER — OFFICE VISIT (OUTPATIENT)
Dept: ENDOCRINOLOGY | Facility: HOSPITAL | Age: 66
End: 2021-02-09
Payer: MEDICARE

## 2021-02-09 VITALS
HEIGHT: 67 IN | DIASTOLIC BLOOD PRESSURE: 90 MMHG | BODY MASS INDEX: 39.87 KG/M2 | WEIGHT: 254 LBS | HEART RATE: 74 BPM | SYSTOLIC BLOOD PRESSURE: 144 MMHG

## 2021-02-09 DIAGNOSIS — Z79.4 TYPE 2 DIABETES MELLITUS WITH DIABETIC NEUROPATHY, WITH LONG-TERM CURRENT USE OF INSULIN (HCC): Primary | ICD-10-CM

## 2021-02-09 DIAGNOSIS — E11.40 TYPE 2 DIABETES MELLITUS WITH DIABETIC NEUROPATHY, WITH LONG-TERM CURRENT USE OF INSULIN (HCC): Primary | ICD-10-CM

## 2021-02-09 DIAGNOSIS — R80.9 MICROALBUMINURIA DUE TO TYPE 2 DIABETES MELLITUS (HCC): ICD-10-CM

## 2021-02-09 DIAGNOSIS — E11.29 MICROALBUMINURIA DUE TO TYPE 2 DIABETES MELLITUS (HCC): ICD-10-CM

## 2021-02-09 DIAGNOSIS — E78.2 MIXED HYPERLIPIDEMIA: ICD-10-CM

## 2021-02-09 DIAGNOSIS — I10 ESSENTIAL HYPERTENSION: ICD-10-CM

## 2021-02-09 PROCEDURE — 99214 OFFICE O/P EST MOD 30 MIN: CPT | Performed by: NURSE PRACTITIONER

## 2021-02-09 NOTE — PROGRESS NOTES
Aleks Harrison 77 y o  female MRN: [id number]    Encounter: [id number]      Assessment/Plan     Assessment: This is a 77y o -year-old female with type 2 diabetes with neuropathy, hypertension and hyperlipidemia         Plan:  1   Type 2 diabetes insulin requiring:  Her most recent hemoglobin A1c is stable at 6 4   She presents with no blood sugar records or meter for download in the office today  By her report, she has had rare and mild episodes of hypoglycemia over the past few months   For now, she will continue her current regimen   I have asked her to send a record of her blood sugars to the office in 2 weeks for review so further changes can be made to help her blood sugars throughout the day  Check hemoglobin A1c prior to next visit      2   Hypertension/microalbuminuria:  Continue lisinopril   Check comprehensive metabolic panel prior to next visit      3   Hyperlipidemia:  Continue Omega 3 fatty acid fish oil  Check fasting lipid panel prior to next visit  CC:  Type 2 Diabetes follow-up    History of Present Illness     HPI:  77y o  year old female with type 2 diabetes for 18 years   She is on oral agents and insulin at home and takes Metformin 1000 mg twice daily, Levemir insulin 18-20 units at bedtime, and Humalog insulin 4-6 units at breakfast and lunch, 2-3 units in the afternoon, and 5-7 units at supper  Her most recent hemoglobin A1c from January 27, 2021  is 6 4  She presents with no blood sugars or meter for download  She denies any polydipsia, and blurry vision   She has no polyphagia   She has 2 times per night nocturia and polyuria  She ones one or two episodes of hypoglycemia int he past few months  She has no chest pain or shortness of breath  She denies nephropathy, retinopathy, heart attack, stroke and claudication but does admit to neuropathy   Her  passed away in June 2020      Her most recent diabetic eye exam was December 2, 2020 and no retinopathy  She complains of some numbness and tingling to her feet at times  Her most recent diabetic foot exam was performed on 2020 at endocrine office visit      Her hypertension is treated with Lasix 40 mg daily and lisinopril 2 5 mg daily        For her hyperlipidemia, she is taking Omega 3 fatty acid fish oil 1000 mg daily        Review of Systems   Constitutional: Negative  Negative for chills, fatigue and fever  HENT: Negative  Negative for trouble swallowing and voice change  Eyes: Negative  Negative for visual disturbance  Respiratory: Negative  Negative for chest tightness and shortness of breath  Cardiovascular: Positive for leg swelling  Negative for chest pain  Gastrointestinal: Negative  Negative for abdominal pain, constipation, diarrhea and vomiting  Endocrine: Positive for polyuria ( at times)  Negative for cold intolerance, heat intolerance, polydipsia and polyphagia  Genitourinary: Negative  Musculoskeletal: Positive for arthralgias ( attributed to arthritis) and myalgias  Skin: Negative  Allergic/Immunologic: Negative  Neurological: Positive for numbness ( to feet at times)  Negative for dizziness, syncope, light-headedness and headaches  Hematological: Negative  Psychiatric/Behavioral: Negative  All other systems reviewed and are negative        Historical Information   Past Medical History:   Diagnosis Date    Asthma     seasonal    Diabetes mellitus ([facility] [place] [address] )     Osteoarthritis     Tremor, hereditary, benign      Past Surgical History:   Procedure Laterality Date     SECTION      3    TOE SURGERY Bilateral     5 th toe bone removed    TOTAL HIP ARTHROPLASTY Right     TUBAL LIGATION Bilateral      Social History   Social History     Substance and Sexual Activity   Alcohol Use Yes    Frequency: 2-4 times a month    Comment: socially     Social History     Substance and Sexual Activity   Drug Use No     Social History     Tobacco Use   Smoking Status Former Smoker  Packs/day: 1 00    Years: 15 00    Pack years: 15 00    Types: Cigarettes    Quit date: 2007    Years since quittin 7   Smokeless Tobacco Never Used     Family History:   Family History   Problem Relation Age of Onset    Cirrhosis Mother     Heart disease Mother     Esophageal varices Mother     Brain cancer Father     Prostate cancer Father     Stroke Father     Heart attack Father     Skin cancer Brother     COPD Brother     Diabetes type II Maternal Aunt     Diabetes type II Maternal Grandfather     Parkinsonism Maternal Grandfather     Coronary artery disease Brother     Prostate cancer Brother     Post-traumatic stress disorder Son     No Known Problems Son     No Known Problems Daughter     Parkinsonism Maternal Aunt     Diabetes type I Cousin        Meds/Allergies   Current Outpatient Medications   Medication Sig Dispense Refill    albuterol (PROVENTIL HFA) 90 mcg/act inhaler Inhale      aspirin 81 MG tablet Take 81 mg by mouth 2 (two) times a day      b complex vitamins capsule Take 1 capsule by mouth daily      BD ULTRA-FINE PEN NEEDLES 29G X 12 7MM MISC USE UP TO 5 TIMES A  each 3    Biotin 5000 MCG CAPS Take 5,000 capsules by mouth 2 (two) times a day      Calcium Carbonate (CALCIUM 600 PO) Take 2 tablets by mouth daily With vitamin d      docusate sodium (COLACE) 100 mg capsule Take 100 mg by mouth daily       ferrous sulfate 325 (65 Fe) mg tablet Take 325 mg by mouth 2 (two) times a day      folic acid (FOLVITE) 580 mcg tablet Take 800 mcg by mouth daily       furosemide (Lasix) 40 mg tablet Take 1 tablet (40 mg total) by mouth daily prn 90 tablet 3    insulin detemir (LEVEMIR FLEXTOUCH) 100 Units/mL injection pen Inject 18-20 Units under the skin daily at bedtime 10 pen 6    insulin lispro (HumaLOG KwikPen) 100 units/mL injection pen INJECT SUBCUTANEOUSLY 2-7  UNITS 4 TIMES DAILY AS  DIRECTED 30 mL 4    lisinopril (ZESTRIL) 2 5 mg tablet TAKE 1 TABLET BY MOUTH  DAILY 90 tablet 0    metFORMIN (GLUCOPHAGE) 1000 MG tablet TAKE 1 TABLET BY MOUTH  TWICE DAILY WITH MEALS 180 tablet 1    Omega-3 Fatty Acids (FISH OIL) 1,000 mg Take 1,000 mg by mouth daily      TRUE METRIX BLOOD GLUCOSE TEST test strip Use as instructed up to 4 times a day 400 each 3    vitamin E, tocopherol, 400 units capsule Take 400 Units by mouth daily       No current facility-administered medications for this visit  Allergies   Allergen Reactions    Actos [Pioglitazone] Edema     Severe lower extremity    Gentamicin     Other     Oxycodone GI Intolerance    Sulfa Antibiotics        Objective   Vitals: Blood pressure 144/90, pulse 74, height 5' 7 32" (1 71 m), weight 115 kg (254 lb)  Physical Exam  Vitals signs reviewed  Constitutional:       Appearance: She is well-developed  She is obese  HENT:      Head: Normocephalic and atraumatic  Eyes:      Conjunctiva/sclera: Conjunctivae normal       Pupils: Pupils are equal, round, and reactive to light  Comments:  Wears glasses   Neck:      Musculoskeletal: Normal range of motion and neck supple  Cardiovascular:      Rate and Rhythm: Normal rate and regular rhythm  Heart sounds: Normal heart sounds  Pulmonary:      Effort: Pulmonary effort is normal       Breath sounds: Normal breath sounds  Abdominal:      General: Bowel sounds are normal       Palpations: Abdomen is soft  Musculoskeletal: Normal range of motion  Right lower leg: Edema present  Left lower leg: Edema present  Skin:     General: Skin is warm and dry  Neurological:      Mental Status: She is alert and oriented to person, place, and time  Psychiatric:         Behavior: Behavior normal          Thought Content:  Thought content normal          Judgment: Judgment normal        Lab Results:   Lab Results   Component Value Date/Time    Hemoglobin A1C 6 4 (H) 01/27/2021 07:48 AM    Hemoglobin A1C 6 2 (H) 10/29/2020 08:53 AM Hemoglobin A1C 7 1 (H) 07/16/2020 07:14 AM    WBC 6 59 10/29/2020 08:53 AM    Hemoglobin 11 4 (L) 10/29/2020 08:53 AM    Hematocrit 35 7 10/29/2020 08:53 AM    MCV 97 10/29/2020 08:53 AM    Platelets 544 93/88/1754 08:53 AM    BUN 17 01/27/2021 07:48 AM    BUN 12 10/29/2020 08:53 AM    BUN 24 07/16/2020 07:14 AM    Potassium 4 3 01/27/2021 07:48 AM    Potassium 4 4 10/29/2020 08:53 AM    Potassium 4 4 07/16/2020 07:14 AM    Chloride 107 01/27/2021 07:48 AM    Chloride 105 10/29/2020 08:53 AM    Chloride 104 07/16/2020 07:14 AM    CO2 31 01/27/2021 07:48 AM    CO2 29 10/29/2020 08:53 AM    CO2 29 07/16/2020 07:14 AM    Creatinine 1 01 01/27/2021 07:48 AM    Creatinine 0 95 10/29/2020 08:53 AM    Creatinine 0 97 07/16/2020 07:14 AM    AST 15 01/27/2021 07:48 AM    AST 17 10/29/2020 08:53 AM    AST 14 07/16/2020 07:14 AM    ALT 17 01/27/2021 07:48 AM    ALT 18 10/29/2020 08:53 AM    ALT 17 07/16/2020 07:14 AM    Albumin 3 7 01/27/2021 07:48 AM    Albumin 3 5 10/29/2020 08:53 AM    Albumin 3 5 07/16/2020 07:14 AM    HDL, Direct 61 07/16/2020 07:14 AM    Triglycerides 72 07/16/2020 07:14 AM     Portions of the record may have been created with voice recognition software  Occasional wrong word or "sound a like" substitutions may have occurred due to the inherent limitations of voice recognition software  Read the chart carefully and recognize, using context, where substitutions have occurred

## 2021-02-10 ENCOUNTER — TELEPHONE (OUTPATIENT)
Dept: ADMINISTRATIVE | Facility: OTHER | Age: 66
End: 2021-02-10

## 2021-02-10 NOTE — LETTER
Diabetic Eye Exam Form    Date Requested: 02/10/21  Patient: Nely Vasquez  Patient : 1955   Referring Provider: Kvng Ramirez,     Dilated Retinal Exam, Optomap-Iris Exam, or Fundus Photography Done         Yes ([place] one above)         No     Date of Diabetic Eye Exam ______________________________  Left Eye      Exam did show retinopathy    Exam did not show retinopathy         Mild       Moderate       None       Proliferative       Severe     Right Eye     Exam did show retinopathy    Exam did not show retinopathy         Mild       Moderate       None       Proliferative       Severe     Comments __________________________________________________________    Practice Providing Exam ______________________________________________    Exam Performed By (print name) _______________________________________      Provider Signature ___________________________________________________      These reports are needed for  compliance    Please fax this completed form and a copy of the Diabetic Eye Exam report to our office located at [place] [address] as soon as possible to [phone] gonzalo Enriquez: Phone [phone]    We thank you for your assistance in treating our mutual patient

## 2021-02-10 NOTE — LETTER
Procedure Request Form: Colonoscopy      Date Requested: 21  Patient: Bella Suarez  Patient : 1955   Referring Provider: Daniel Valadez, DO        Date of Procedure ______________________________       The above patient has informed us that they have completed their   most recent Colonoscopy at your facility  Please complete   this form and attach all corresponding procedure reports/results  Comments __________________________________________________________  ____________________________________________________________________  ____________________________________________________________________  ____________________________________________________________________    Facility Completing Procedure _________________________________________    Form Completed By (print name) _______________________________________      Signature __________________________________________________________      These reports are needed for  compliance    Please fax this completed form and a copy of the procedure report to our office located at [place] [address] as soon as possible to [phone] gonzalo Mckeon: Phone [phone]    We thank you for your assistance in treating our mutual patient

## 2021-02-10 NOTE — TELEPHONE ENCOUNTER
----- Message from [facility] sent at 2/9/2021 12:08 PM EST -----  Regarding: Colonoscopy  02/09/21 12:08 PM    Hello, our patient Jerrie Gilford has had a Colonoscopy about 2-3 years ago with Dr Kathleen Atkinson and Ramila GARZA  Their number is [phone]      Thank you,  [address] [place] [address] [facility] Rosario Lopez

## 2021-02-10 NOTE — TELEPHONE ENCOUNTER
Upon review of the In Basket request and the patient's chart, initial outreach has been made via fax, please see Contacts section for details       Thank you  Trip Dominog MA

## 2021-02-18 NOTE — TELEPHONE ENCOUNTER
As a follow-up, a second attempt has been made for outreach via fax, please see Contacts section for details      Thank you  Brandin Calloway MA

## 2021-03-01 NOTE — TELEPHONE ENCOUNTER
As a final attempt, a third outreach has been made via telephone call  Please see Contacts section for details  This encounter will be closed and completed by end of day  Should we receive the requested information because of previous outreach attempts, the requested patient's chart will be updated appropriately       Thank you  Yash Muhammad MA

## 2021-03-04 DIAGNOSIS — Z23 ENCOUNTER FOR IMMUNIZATION: ICD-10-CM

## 2021-03-09 NOTE — TELEPHONE ENCOUNTER
Upon review of the In Basket request we were able to locate, review, and update the patient chart as requested for CRC: Colonoscopy  Any additional questions or concerns should be emailed to the Practice Liaisons via [email or web address] email, please do not reply via In Basket      Thank you  Nik Galdamez MA

## 2021-03-17 ENCOUNTER — IMMUNIZATIONS (OUTPATIENT)
Dept: FAMILY MEDICINE CLINIC | Facility: HOSPITAL | Age: 66
End: 2021-03-17

## 2021-03-17 DIAGNOSIS — Z23 ENCOUNTER FOR IMMUNIZATION: Primary | ICD-10-CM

## 2021-03-17 PROCEDURE — 0001A SARS-COV-2 / COVID-19 MRNA VACCINE (PFIZER-BIONTECH) 30 MCG: CPT

## 2021-03-17 PROCEDURE — 91300 SARS-COV-2 / COVID-19 MRNA VACCINE (PFIZER-BIONTECH) 30 MCG: CPT

## 2021-04-09 ENCOUNTER — IMMUNIZATIONS (OUTPATIENT)
Dept: FAMILY MEDICINE CLINIC | Facility: HOSPITAL | Age: 66
End: 2021-04-09

## 2021-04-09 DIAGNOSIS — Z23 ENCOUNTER FOR IMMUNIZATION: Primary | ICD-10-CM

## 2021-04-09 PROCEDURE — 91300 SARS-COV-2 / COVID-19 MRNA VACCINE (PFIZER-BIONTECH) 30 MCG: CPT

## 2021-04-09 PROCEDURE — 0002A SARS-COV-2 / COVID-19 MRNA VACCINE (PFIZER-BIONTECH) 30 MCG: CPT

## 2021-04-14 DIAGNOSIS — I10 ESSENTIAL HYPERTENSION: ICD-10-CM

## 2021-04-14 DIAGNOSIS — R80.9 MICROALBUMINURIA: ICD-10-CM

## 2021-04-14 RX ORDER — LISINOPRIL 2.5 MG/1
2.5 TABLET ORAL DAILY
Qty: 90 TABLET | Refills: 3 | Status: SHIPPED | OUTPATIENT
Start: 2021-04-14 | End: 2022-02-21

## 2021-05-03 ENCOUNTER — LAB (OUTPATIENT)
Dept: LAB | Facility: CLINIC | Age: 66
End: 2021-05-03
Payer: MEDICARE

## 2021-05-03 DIAGNOSIS — E78.2 MIXED HYPERLIPIDEMIA: ICD-10-CM

## 2021-05-03 DIAGNOSIS — E11.29 MICROALBUMINURIA DUE TO TYPE 2 DIABETES MELLITUS (HCC): ICD-10-CM

## 2021-05-03 DIAGNOSIS — Z79.4 TYPE 2 DIABETES MELLITUS WITH DIABETIC NEUROPATHY, WITH LONG-TERM CURRENT USE OF INSULIN (HCC): ICD-10-CM

## 2021-05-03 DIAGNOSIS — I10 ESSENTIAL HYPERTENSION: ICD-10-CM

## 2021-05-03 DIAGNOSIS — E11.40 TYPE 2 DIABETES MELLITUS WITH DIABETIC NEUROPATHY, WITH LONG-TERM CURRENT USE OF INSULIN (HCC): ICD-10-CM

## 2021-05-03 DIAGNOSIS — R80.9 MICROALBUMINURIA DUE TO TYPE 2 DIABETES MELLITUS (HCC): ICD-10-CM

## 2021-05-03 LAB
ALBUMIN SERPL BCP-MCNC: 3.5 G/DL (ref 3.5–5)
ALP SERPL-CCNC: 60 U/L (ref 46–116)
ALT SERPL W P-5'-P-CCNC: 19 U/L (ref 12–78)
ANION GAP SERPL CALCULATED.3IONS-SCNC: 4 MMOL/L (ref 4–13)
AST SERPL W P-5'-P-CCNC: 16 U/L (ref 5–45)
BASOPHILS # BLD AUTO: 0.02 THOUSANDS/ΜL (ref 0–0.1)
BASOPHILS NFR BLD AUTO: 0 % (ref 0–1)
BILIRUB SERPL-MCNC: 0.5 MG/DL (ref 0.2–1)
BUN SERPL-MCNC: 21 MG/DL (ref 5–25)
CALCIUM SERPL-MCNC: 9.6 MG/DL (ref 8.3–10.1)
CHLORIDE SERPL-SCNC: 103 MMOL/L (ref 100–108)
CHOLEST SERPL-MCNC: 182 MG/DL (ref 50–200)
CO2 SERPL-SCNC: 32 MMOL/L (ref 21–32)
CREAT SERPL-MCNC: 1.11 MG/DL (ref 0.6–1.3)
EOSINOPHIL # BLD AUTO: 0.49 THOUSAND/ΜL (ref 0–0.61)
EOSINOPHIL NFR BLD AUTO: 7 % (ref 0–6)
ERYTHROCYTE [DISTWIDTH] IN BLOOD BY AUTOMATED COUNT: 12.1 % (ref 11.6–15.1)
EST. AVERAGE GLUCOSE BLD GHB EST-MCNC: 126 MG/DL
GFR SERPL CREATININE-BSD FRML MDRD: 52 ML/MIN/1.73SQ M
GLUCOSE P FAST SERPL-MCNC: 76 MG/DL (ref 65–99)
HBA1C MFR BLD: 6 %
HCT VFR BLD AUTO: 36.9 % (ref 34.8–46.1)
HDLC SERPL-MCNC: 69 MG/DL
HGB BLD-MCNC: 11.7 G/DL (ref 11.5–15.4)
IMM GRANULOCYTES # BLD AUTO: 0.01 THOUSAND/UL (ref 0–0.2)
IMM GRANULOCYTES NFR BLD AUTO: 0 % (ref 0–2)
LDLC SERPL CALC-MCNC: 104 MG/DL (ref 0–100)
LYMPHOCYTES # BLD AUTO: 2.31 THOUSANDS/ΜL (ref 0.6–4.47)
LYMPHOCYTES NFR BLD AUTO: 33 % (ref 14–44)
MCH RBC QN AUTO: 30.9 PG (ref 26.8–34.3)
MCHC RBC AUTO-ENTMCNC: 31.7 G/DL (ref 31.4–37.4)
MCV RBC AUTO: 97 FL (ref 82–98)
MONOCYTES # BLD AUTO: 0.69 THOUSAND/ΜL (ref 0.17–1.22)
MONOCYTES NFR BLD AUTO: 10 % (ref 4–12)
NEUTROPHILS # BLD AUTO: 3.45 THOUSANDS/ΜL (ref 1.85–7.62)
NEUTS SEG NFR BLD AUTO: 50 % (ref 43–75)
NONHDLC SERPL-MCNC: 113 MG/DL
NRBC BLD AUTO-RTO: 0 /100 WBCS
PLATELET # BLD AUTO: 308 THOUSANDS/UL (ref 149–390)
PMV BLD AUTO: 10.2 FL (ref 8.9–12.7)
POTASSIUM SERPL-SCNC: 4.2 MMOL/L (ref 3.5–5.3)
PROT SERPL-MCNC: 6.8 G/DL (ref 6.4–8.2)
RBC # BLD AUTO: 3.79 MILLION/UL (ref 3.81–5.12)
SODIUM SERPL-SCNC: 139 MMOL/L (ref 136–145)
TRIGL SERPL-MCNC: 47 MG/DL
TSH SERPL DL<=0.05 MIU/L-ACNC: 2.85 UIU/ML (ref 0.36–3.74)
WBC # BLD AUTO: 6.97 THOUSAND/UL (ref 4.31–10.16)

## 2021-05-03 PROCEDURE — 84443 ASSAY THYROID STIM HORMONE: CPT

## 2021-05-03 PROCEDURE — 80061 LIPID PANEL: CPT

## 2021-05-03 PROCEDURE — 80053 COMPREHEN METABOLIC PANEL: CPT

## 2021-05-03 PROCEDURE — 36415 COLL VENOUS BLD VENIPUNCTURE: CPT

## 2021-05-03 PROCEDURE — 85025 COMPLETE CBC W/AUTO DIFF WBC: CPT

## 2021-05-03 PROCEDURE — 83036 HEMOGLOBIN GLYCOSYLATED A1C: CPT

## 2021-05-11 ENCOUNTER — OFFICE VISIT (OUTPATIENT)
Dept: ENDOCRINOLOGY | Facility: HOSPITAL | Age: 66
End: 2021-05-11
Payer: MEDICARE

## 2021-05-11 VITALS
WEIGHT: 263.8 LBS | DIASTOLIC BLOOD PRESSURE: 90 MMHG | HEIGHT: 67 IN | BODY MASS INDEX: 41.4 KG/M2 | HEART RATE: 85 BPM | SYSTOLIC BLOOD PRESSURE: 136 MMHG

## 2021-05-11 DIAGNOSIS — I10 ESSENTIAL HYPERTENSION: ICD-10-CM

## 2021-05-11 DIAGNOSIS — E11.40 TYPE 2 DIABETES MELLITUS WITH DIABETIC NEUROPATHY, WITH LONG-TERM CURRENT USE OF INSULIN (HCC): Primary | ICD-10-CM

## 2021-05-11 DIAGNOSIS — E66.01 OBESITY, MORBID (HCC): ICD-10-CM

## 2021-05-11 DIAGNOSIS — R80.9 MICROALBUMINURIA DUE TO TYPE 2 DIABETES MELLITUS (HCC): ICD-10-CM

## 2021-05-11 DIAGNOSIS — E78.2 MIXED HYPERLIPIDEMIA: ICD-10-CM

## 2021-05-11 DIAGNOSIS — E11.29 MICROALBUMINURIA DUE TO TYPE 2 DIABETES MELLITUS (HCC): ICD-10-CM

## 2021-05-11 DIAGNOSIS — Z79.4 TYPE 2 DIABETES MELLITUS WITH DIABETIC NEUROPATHY, WITH LONG-TERM CURRENT USE OF INSULIN (HCC): Primary | ICD-10-CM

## 2021-05-11 PROCEDURE — 99214 OFFICE O/P EST MOD 30 MIN: CPT | Performed by: NURSE PRACTITIONER

## 2021-05-11 RX ORDER — INSULIN LISPRO 100 [IU]/ML
INJECTION, SOLUTION INTRAVENOUS; SUBCUTANEOUS
Qty: 30 ML | Refills: 4 | Status: SHIPPED | OUTPATIENT
Start: 2021-05-11 | End: 2022-06-07

## 2021-05-11 RX ORDER — PEN NEEDLE, DIABETIC 29 G X1/2"
NEEDLE, DISPOSABLE MISCELLANEOUS
Qty: 450 EACH | Refills: 3 | Status: SHIPPED | OUTPATIENT
Start: 2021-05-11 | End: 2022-04-06

## 2021-05-11 NOTE — PROGRESS NOTES
Sonia Roca 77 y o  female MRN: [id number]    Encounter: [id number]      Assessment/Plan     Assessment: This is a 77y o -year-old female with type 2 diabetes with neuropathy, hypertension and hyperlipidemia      Plan:  1   Type 2 diabetes insulin requiring:  Her most recent hemoglobin A1c is stable at 6 0   She presents with no blood sugar records or meter for download in the office today   By her report, she has had rare and mild episodes of hypoglycemia over the past few months   For now, she will continue her current regimen   I have asked her to send a record of her blood sugars to the office in 2 weeks for review so further changes can be made to help her blood sugars throughout the day   Check hemoglobin A1c prior to next visit      2   Hypertension/microalbuminuria:  Continue lisinopril   Check comprehensive metabolic panel prior to next visit      3   Hyperlipidemia: Stable   Continue Omega 3 fatty acid fish oil       CC:  Type 2 Diabetes follow-up    History of Present Illness     HPI:  77y o  year old female with type 2 diabetes for approximately 18 years   She is on oral agents and insulin at home and takes Metformin 1000 mg twice daily, Levemir insulin 18-20 units at bedtime, and Humalog insulin 4-6 units at breakfast and lunch, 2-3 units in the afternoon, and 5-7 units at supper  Her most recent hemoglobin A1c from  May 3, 2021 is 6 0   She presents with no blood sugars or meter for download  She denies any polydipsia, and blurry vision   She has no polyphagia   She has 2 times per night nocturia and polyuria  She ones one or two episodes of hypoglycemia int he past few months  She has no chest pain or shortness of breath   She denies nephropathy, retinopathy, heart attack, stroke and claudication but does admit to neuropathy  Her  passed away in June 2020      Her most recent diabetic eye exam was December 2, 2020 and no retinopathy  She complains of some numbness and tingling to her feet at times   Her most recent diabetic foot exam was performed on 2020 at endocrine office visit      Her hypertension is treated with Lasix 40 mg daily and lisinopril 2 5 mg daily        For her hyperlipidemia, she is taking Omega 3 fatty acid fish oil 1000 mg daily        Review of Systems   Constitutional: Negative  Negative for chills, fatigue and fever  HENT: Negative  Negative for trouble swallowing and voice change  Eyes: Negative  Negative for visual disturbance  Respiratory: Negative  Negative for chest tightness and shortness of breath  Cardiovascular: Positive for leg swelling  Negative for chest pain  Gastrointestinal: Negative  Negative for abdominal pain, constipation, diarrhea and vomiting  Endocrine: Positive for polyuria  Negative for cold intolerance, heat intolerance, polydipsia and polyphagia  Genitourinary: Negative  Musculoskeletal: Positive for arthralgias ( attributed to arthritis) and myalgias  Skin: Negative  Allergic/Immunologic: Negative  Neurological: Positive for numbness ( to feet at times)  Negative for dizziness, syncope, light-headedness and headaches  Hematological: Negative  Psychiatric/Behavioral: Negative  All other systems reviewed and are negative        Historical Information   Past Medical History:   Diagnosis Date    Asthma     seasonal    [facility] mellitus ([facility] [address] )     Osteoarthritis     Tremor, hereditary, benign      Past Surgical History:   Procedure Laterality Date     SECTION      3    TOE SURGERY Bilateral     5 th toe bone removed    TOTAL HIP ARTHROPLASTY Right     TUBAL LIGATION Bilateral      Social History   Social History     Substance and Sexual Activity   Alcohol Use Yes    Frequency: 2-4 times a month    Comment: socially     Social History     Substance and Sexual Activity   Drug Use No     Social History     Tobacco Use   Smoking Status Former Smoker    Packs/day:     Years: 15     Pack years: 15 00    Types: Cigarettes    Quit date: 2007    Years since quittin 9   Smokeless Tobacco Never Used     Family History:   Family History   Problem Relation Age of Onset    Cirrhosis Mother     Heart disease Mother     Esophageal varices Mother     Brain cancer Father     Prostate cancer Father     Stroke Father     Heart attack Father     Skin cancer Brother     COPD Brother     Diabetes type II Maternal Aunt     Diabetes type II Maternal Grandfather     Parkinsonism Maternal Grandfather     Coronary artery disease Brother     Prostate cancer Brother     Post-traumatic stress disorder Son     No Known Problems Son     No Known Problems Daughter     Parkinsonism Maternal Aunt     Diabetes type I Cousin        Meds/Allergies   Current Outpatient Medications   Medication Sig Dispense Refill    albuterol (PROVENTIL HFA) 90 mcg/act inhaler Inhale      aspirin 81 MG tablet Take 81 mg by mouth 2 (two) times a day      b complex vitamins capsule Take 1 capsule by mouth daily      BD ULTRA-FINE PEN NEEDLES 29G X 12 7MM MISC USE UP TO 5 TIMES A  each 3    Biotin 5000 MCG CAPS Take 5,000 capsules by mouth 2 (two) times a day      Calcium Carbonate (CALCIUM 600 PO) Take 2 tablets by mouth daily With vitamin d      docusate sodium (COLACE) 100 mg capsule Take 100 mg by mouth daily       ferrous sulfate 325 (65 Fe) mg tablet Take 325 mg by mouth 2 (two) times a day      folic acid (FOLVITE) 865 mcg tablet Take 800 mcg by mouth daily       furosemide (Lasix) 40 mg tablet Take 1 tablet (40 mg total) by mouth daily prn 90 tablet 3    insulin detemir (LEVEMIR FLEXTOUCH) 100 Units/mL injection pen Inject 18-20 Units under the skin daily at bedtime 10 pen 6    insulin lispro (HumaLOG KwikPen) 100 units/mL injection pen INJECT SUBCUTANEOUSLY 2-7  UNITS 4 TIMES DAILY AS  DIRECTED 30 mL 4    lisinopril (ZESTRIL) 2 5 mg tablet Take 1 tablet (2 5 mg total) by mouth daily 90 tablet 3    metFORMIN (GLUCOPHAGE) 1000 MG tablet TAKE 1 TABLET BY MOUTH  TWICE DAILY WITH MEALS 180 tablet 1    Omega-3 Fatty Acids (FISH OIL) 1,000 mg Take 1,000 mg by mouth daily      TRUE METRIX BLOOD GLUCOSE TEST test strip Use as instructed up to 4 times a day 400 each 3    vitamin E, tocopherol, 400 units capsule Take 400 Units by mouth daily       No current facility-administered medications for this visit  Allergies   Allergen Reactions    Actos [Pioglitazone] Edema     Severe lower extremity    Gentamicin     Other     Oxycodone GI Intolerance    Sulfa Antibiotics        Objective   Vitals: Blood pressure 136/90, pulse 85, height 5' 7 32" (1 71 m), weight 120 kg (263 lb 12 8 oz)  Physical Exam  Vitals signs reviewed  Constitutional:       Appearance: She is well-developed  She is obese  HENT:      Head: Normocephalic and atraumatic  Eyes:      Conjunctiva/sclera: Conjunctivae normal       Pupils: Pupils are equal, round, and reactive to light  Comments:  Wears glasses   Neck:      Musculoskeletal: Normal range of motion and neck supple  Cardiovascular:      Rate and Rhythm: Normal rate and regular rhythm  Heart sounds: Normal heart sounds  Pulmonary:      Effort: Pulmonary effort is normal       Breath sounds: Normal breath sounds  Abdominal:      General: Bowel sounds are normal       Palpations: Abdomen is soft  Musculoskeletal: Normal range of motion  Skin:     General: Skin is warm and dry  Neurological:      Mental Status: She is alert and oriented to person, place, and time  Psychiatric:         Behavior: Behavior normal          Thought Content:  Thought content normal          Judgment: Judgment normal        Lab Results:   Lab Results   Component Value Date/Time    Hemoglobin A1C 6 0 (H) 05/03/2021 07:37 AM    Hemoglobin A1C 6 4 (H) 01/27/2021 07:48 AM    Hemoglobin A1C 6 2 (H) 10/29/2020 08:53 AM    WBC 6 97 05/03/2021 07:37 AM    WBC 6 59 10/29/2020 08:53 AM    Hemoglobin 11 7 05/03/2021 07:37 AM    Hemoglobin 11 4 (L) 10/29/2020 08:53 AM    Hematocrit 36 9 05/03/2021 07:37 AM    Hematocrit 35 7 10/29/2020 08:53 AM    MCV 97 05/03/2021 07:37 AM    MCV 97 10/29/2020 08:53 AM    Platelets 962 11/79/0872 07:37 AM    Platelets 621 79/86/4183 08:53 AM    BUN 21 05/03/2021 07:37 AM    BUN 17 01/27/2021 07:48 AM    BUN 12 10/29/2020 08:53 AM    Potassium 4 2 05/03/2021 07:37 AM    Potassium 4 3 01/27/2021 07:48 AM    Potassium 4 4 10/29/2020 08:53 AM    Chloride 103 05/03/2021 07:37 AM    Chloride 107 01/27/2021 07:48 AM    Chloride 105 10/29/2020 08:53 AM    CO2 32 05/03/2021 07:37 AM    CO2 31 01/27/2021 07:48 AM    CO2 29 10/29/2020 08:53 AM    Creatinine 1 11 05/03/2021 07:37 AM    Creatinine 1 01 01/27/2021 07:48 AM    Creatinine 0 95 10/29/2020 08:53 AM    AST 16 05/03/2021 07:37 AM    AST 15 01/27/2021 07:48 AM    AST 17 10/29/2020 08:53 AM    ALT 19 05/03/2021 07:37 AM    ALT 17 01/27/2021 07:48 AM    ALT 18 10/29/2020 08:53 AM    Albumin 3 5 05/03/2021 07:37 AM    Albumin 3 7 01/27/2021 07:48 AM    Albumin 3 5 10/29/2020 08:53 AM    HDL, Direct 69 05/03/2021 07:37 AM    HDL, Direct 61 07/16/2020 07:14 AM    Triglycerides 47 05/03/2021 07:37 AM    Triglycerides 72 07/16/2020 07:14 AM     Portions of the record may have been created with voice recognition software  Occasional wrong word or "sound a like" substitutions may have occurred due to the inherent limitations of voice recognition software  Read the chart carefully and recognize, using context, where substitutions have occurred

## 2021-06-09 DIAGNOSIS — I89.0 LYMPHEDEMA: ICD-10-CM

## 2021-06-09 RX ORDER — FUROSEMIDE 40 MG/1
TABLET ORAL
Qty: 90 TABLET | Refills: 3 | Status: SHIPPED | OUTPATIENT
Start: 2021-06-09

## 2021-08-12 ENCOUNTER — APPOINTMENT (OUTPATIENT)
Dept: LAB | Facility: CLINIC | Age: 66
End: 2021-08-12
Payer: MEDICARE

## 2021-08-18 ENCOUNTER — OFFICE VISIT (OUTPATIENT)
Dept: ENDOCRINOLOGY | Facility: HOSPITAL | Age: 66
End: 2021-08-18
Payer: MEDICARE

## 2021-08-18 VITALS
HEIGHT: 67 IN | SYSTOLIC BLOOD PRESSURE: 146 MMHG | BODY MASS INDEX: 41.84 KG/M2 | DIASTOLIC BLOOD PRESSURE: 90 MMHG | HEART RATE: 78 BPM | WEIGHT: 266.6 LBS

## 2021-08-18 DIAGNOSIS — E11.40 TYPE 2 DIABETES MELLITUS WITH DIABETIC NEUROPATHY, WITH LONG-TERM CURRENT USE OF INSULIN (HCC): Primary | ICD-10-CM

## 2021-08-18 DIAGNOSIS — Z79.4 TYPE 2 DIABETES MELLITUS WITH DIABETIC NEUROPATHY, WITH LONG-TERM CURRENT USE OF INSULIN (HCC): Primary | ICD-10-CM

## 2021-08-18 DIAGNOSIS — E11.29 MICROALBUMINURIA DUE TO TYPE 2 DIABETES MELLITUS (HCC): ICD-10-CM

## 2021-08-18 DIAGNOSIS — R80.9 MICROALBUMINURIA DUE TO TYPE 2 DIABETES MELLITUS (HCC): ICD-10-CM

## 2021-08-18 DIAGNOSIS — I10 ESSENTIAL HYPERTENSION: ICD-10-CM

## 2021-08-18 DIAGNOSIS — E78.2 MIXED HYPERLIPIDEMIA: ICD-10-CM

## 2021-08-18 PROCEDURE — 99214 OFFICE O/P EST MOD 30 MIN: CPT | Performed by: NURSE PRACTITIONER

## 2021-08-18 NOTE — PROGRESS NOTES
Elvie Napier 77 y o  female MRN: [id number]    Encounter: [id number]      Assessment/Plan     Assessment: This is a 77y o -year-old female with type 2 diabetes with neuropathy, hypertension and hyperlipidemia      Plan:  1   Type 2 diabetes insulin requiring:  Her most recent hemoglobin A1c is stable at 6  2   She presents with no blood sugar records or meter for download in the office today   By her report, she has had rare and mild episodes of hypoglycemia over the past few months   For now, she will continue her current regimen   I have asked her to send a record of her blood sugars to the office in 2 weeks for review so further changes can be made to help her blood sugars throughout the day   Check hemoglobin A1c prior to next visit      2   Hypertension/microalbuminuria:  Continue lisinopril   Check comprehensive metabolic panel prior to next visit      3   Hyperlipidemia: Continue Omega 3 fatty acid fish oil  Check fasting lipid panel prior to next visit  CC: Type 2 Diabetes follow up    History of Present Illness     HPI:  89 W  o  year old female with type 2 diabetes for approximately 18 years   She is on oral agents and insulin at home and takes Metformin 1000 mg twice daily, Levemir insulin 18-20 units at bedtime, and Humalog insulin 4-6 units at breakfast and lunch, 2-3 units in the afternoon, and 5-7 units at supper  Her most recent hemoglobin A1c from  August 12, 2021 is 6 2   She presents with no blood sugars or meter for download  She denies any polydipsia, and blurry vision   She has no polyphagia  Nannette Jass has 2-3 times per night nocturia and polyuria  She ones one or two episodes of hypoglycemia int he past few months  She has no chest pain or shortness of breath   She denies nephropathy, retinopathy, heart attack, stroke and claudication but does admit to neuropathy  Her  passed away in June 2020      Her most recent diabetic eye exam was December 2, 2020 and no retinopathy  She complains of some numbness and tingling to her feet at times   Her most recent diabetic foot exam was performed on 2020 at endocrine office visit      Her hypertension is treated with Lasix 40 mg daily and lisinopril 2 5 mg daily        For her hyperlipidemia, she is taking Omega 3 fatty acid fish oil 1000 mg daily        Review of Systems   Constitutional: Negative  Negative for chills, fatigue and fever  HENT: Negative  Negative for trouble swallowing and voice change  Eyes: Negative  Negative for visual disturbance  Respiratory: Negative  Negative for chest tightness and shortness of breath  Cardiovascular: Positive for leg swelling  Negative for chest pain  Gastrointestinal: Negative  Negative for abdominal pain, constipation, diarrhea and vomiting  Endocrine: Positive for polyuria (2-3 times per night nocturia)  Negative for cold intolerance, heat intolerance, polydipsia and polyphagia  Genitourinary: Negative  Musculoskeletal: Positive for arthralgias (attributed to arthritis) and myalgias  Skin: Negative  Allergic/Immunologic: Negative  Neurological: Positive for numbness (in feet at times)  Negative for dizziness, syncope, light-headedness and headaches  Hematological: Negative  Psychiatric/Behavioral: Negative  All other systems reviewed and are negative        Historical Information   Past Medical History:   Diagnosis Date    Asthma     seasonal    Diabetes mellitus ([facility] [address] )     Osteoarthritis     Tremor, hereditary, benign      Past Surgical History:   Procedure Laterality Date     SECTION      3    TOE SURGERY Bilateral     5 th toe bone removed    TOTAL HIP ARTHROPLASTY Right     TUBAL LIGATION Bilateral      Social History   Social History     Substance and Sexual Activity   Alcohol Use Yes    Comment: socially     Social History     Substance and Sexual Activity   Drug Use No     Social History     Tobacco Use   Smoking Status Former Smoker  Packs/day: 1 00    Years: 15 00    Pack years: 15 00    Types: Cigarettes    Quit date: 2007    Years since quittin 2   Smokeless Tobacco Never Used     Family History:   Family History   Problem Relation Age of Onset    Cirrhosis Mother     Heart disease Mother     Esophageal varices Mother     Brain cancer Father     Prostate cancer Father     Stroke Father     Heart attack Father     Skin cancer Brother     COPD Brother     Diabetes type II Maternal Aunt     Diabetes type II Maternal Grandfather     Parkinsonism Maternal Grandfather     Coronary artery disease Brother     Prostate cancer Brother     Post-traumatic stress disorder Son     No Known Problems Son     No Known Problems Daughter     Parkinsonism Maternal Aunt     Diabetes type I Cousin        Meds/Allergies   Current Outpatient Medications   Medication Sig Dispense Refill    albuterol (PROVENTIL HFA) 90 mcg/act inhaler Inhale      aspirin 81 MG tablet Take 81 mg by mouth 2 (two) times a day      b complex vitamins capsule Take 1 capsule by mouth daily      BD ULTRA-FINE PEN NEEDLES 29G X 12 7MM MISC Utilize 4 times daily 450 each 3    Biotin 5000 MCG CAPS Take 5,000 capsules by mouth 2 (two) times a day      Calcium Carbonate (CALCIUM 600 PO) Take 2 tablets by mouth daily With vitamin d      docusate sodium (COLACE) 100 mg capsule Take 100 mg by mouth daily       ferrous sulfate 325 (65 Fe) mg tablet Take 325 mg by mouth 2 (two) times a day      folic acid (FOLVITE) 466 mcg tablet Take 800 mcg by mouth daily       furosemide (LASIX) 40 mg tablet TAKE 1 TABLET BY MOUTH  DAILY AS NEEDED 90 tablet 3    insulin detemir (LEVEMIR FLEXTOUCH) 100 Units/mL injection pen Inject 18-20 Units under the skin daily at bedtime 10 pen 6    insulin lispro (HumaLOG KwikPen) 100 units/mL injection pen INJECT SUBCUTANEOUSLY 2-7  UNITS 4 TIMES DAILY AS  DIRECTED 30 mL 4    lisinopril (ZESTRIL) 2 5 mg tablet Take 1 tablet (2 5 mg total) by mouth daily 90 tablet 3    metFORMIN (GLUCOPHAGE) 1000 MG tablet Take 1 tablet (1,000 mg total) by mouth 2 (two) times a day with meals 180 tablet 3    Omega-3 Fatty Acids (FISH OIL) 1,000 mg Take 1,000 mg by mouth daily      TRUE METRIX BLOOD GLUCOSE TEST test strip Use as instructed up to 4 times a day 400 each 3    vitamin E, tocopherol, 400 units capsule Take 400 Units by mouth daily       No current facility-administered medications for this visit  Allergies   Allergen Reactions    Actos [Pioglitazone] Edema     Severe lower extremity    Gentamicin     Other     Oxycodone GI Intolerance    Sulfa Antibiotics        Objective   Vitals: Blood pressure 146/90, pulse 78, height 5' 7 32" (1 71 m), weight 121 kg (266 lb 9 6 oz)  Physical Exam  Vitals reviewed  Constitutional:       Appearance: She is well-developed  She is obese  HENT:      Head: Normocephalic and atraumatic  Eyes:      Conjunctiva/sclera: Conjunctivae normal       Pupils: Pupils are equal, round, and reactive to light  Comments: Wears glasses   Cardiovascular:      Rate and Rhythm: Normal rate and regular rhythm  Heart sounds: Normal heart sounds  Pulmonary:      Effort: Pulmonary effort is normal       Breath sounds: Normal breath sounds  Abdominal:      General: Bowel sounds are normal       Palpations: Abdomen is soft  Musculoskeletal:         General: Normal range of motion  Cervical back: Normal range of motion and neck supple  Right lower leg: Edema present  Left lower leg: Edema present  Skin:     General: Skin is warm and dry  Neurological:      Mental Status: She is alert and oriented to person, place, and time  Psychiatric:         Behavior: Behavior normal          Thought Content:  Thought content normal          Judgment: Judgment normal        Lab Results:   Lab Results   Component Value Date/Time    Hemoglobin A1C 6 2 (H) 08/12/2021 08:50 AM    Hemoglobin A1C 6 0 (H) 05/03/2021 07:37 AM    Hemoglobin A1C 6 4 (H) 01/27/2021 07:48 AM    WBC 6 97 05/03/2021 07:37 AM    WBC 6 59 10/29/2020 08:53 AM    Hemoglobin 11 7 05/03/2021 07:37 AM    Hemoglobin 11 4 (L) 10/29/2020 08:53 AM    Hematocrit 36 9 05/03/2021 07:37 AM    Hematocrit 35 7 10/29/2020 08:53 AM    MCV 97 05/03/2021 07:37 AM    MCV 97 10/29/2020 08:53 AM    Platelets 547 14/00/8509 07:37 AM    Platelets 202 82/49/1811 08:53 AM    BUN 18 08/12/2021 08:50 AM    BUN 21 05/03/2021 07:37 AM    BUN 17 01/27/2021 07:48 AM    Potassium 5 2 08/12/2021 08:50 AM    Potassium 4 2 05/03/2021 07:37 AM    Potassium 4 3 01/27/2021 07:48 AM    Chloride 103 08/12/2021 08:50 AM    Chloride 103 05/03/2021 07:37 AM    Chloride 107 01/27/2021 07:48 AM    CO2 29 08/12/2021 08:50 AM    CO2 32 05/03/2021 07:37 AM    CO2 31 01/27/2021 07:48 AM    Creatinine 1 25 08/12/2021 08:50 AM    Creatinine 1 11 05/03/2021 07:37 AM    Creatinine 1 01 01/27/2021 07:48 AM    AST 13 08/12/2021 08:50 AM    AST 16 05/03/2021 07:37 AM    AST 15 01/27/2021 07:48 AM    ALT 15 08/12/2021 08:50 AM    ALT 19 05/03/2021 07:37 AM    ALT 17 01/27/2021 07:48 AM    Albumin 3 4 (L) 08/12/2021 08:50 AM    Albumin 3 5 05/03/2021 07:37 AM    Albumin 3 7 01/27/2021 07:48 AM    HDL, Direct 69 05/03/2021 07:37 AM    Triglycerides 47 05/03/2021 07:37 AM     Portions of the record may have been created with voice recognition software  Occasional wrong word or "sound a like" substitutions may have occurred due to the inherent limitations of voice recognition software  Read the chart carefully and recognize, using context, where substitutions have occurred

## 2021-10-02 DIAGNOSIS — E11.40 TYPE 2 DIABETES MELLITUS WITH DIABETIC NEUROPATHY, WITH LONG-TERM CURRENT USE OF INSULIN (HCC): ICD-10-CM

## 2021-10-02 DIAGNOSIS — Z79.4 TYPE 2 DIABETES MELLITUS WITH DIABETIC NEUROPATHY, WITH LONG-TERM CURRENT USE OF INSULIN (HCC): ICD-10-CM

## 2021-10-03 RX ORDER — INSULIN DETEMIR 100 [IU]/ML
INJECTION, SOLUTION SUBCUTANEOUS
Qty: 30 ML | Refills: 5 | Status: SHIPPED | OUTPATIENT
Start: 2021-10-03

## 2021-11-17 ENCOUNTER — LAB (OUTPATIENT)
Dept: LAB | Facility: CLINIC | Age: 66
End: 2021-11-17
Payer: MEDICARE

## 2021-11-17 DIAGNOSIS — R80.9 MICROALBUMINURIA DUE TO TYPE 2 DIABETES MELLITUS (HCC): ICD-10-CM

## 2021-11-17 DIAGNOSIS — E11.40 TYPE 2 DIABETES MELLITUS WITH DIABETIC NEUROPATHY, WITH LONG-TERM CURRENT USE OF INSULIN (HCC): ICD-10-CM

## 2021-11-17 DIAGNOSIS — E11.29 MICROALBUMINURIA DUE TO TYPE 2 DIABETES MELLITUS (HCC): ICD-10-CM

## 2021-11-17 DIAGNOSIS — I10 ESSENTIAL HYPERTENSION: ICD-10-CM

## 2021-11-17 DIAGNOSIS — Z79.4 TYPE 2 DIABETES MELLITUS WITH DIABETIC NEUROPATHY, WITH LONG-TERM CURRENT USE OF INSULIN (HCC): ICD-10-CM

## 2021-11-17 DIAGNOSIS — E78.2 MIXED HYPERLIPIDEMIA: ICD-10-CM

## 2021-11-17 LAB
ALBUMIN SERPL BCP-MCNC: 2.9 G/DL (ref 3.5–5)
ALP SERPL-CCNC: 89 U/L (ref 46–116)
ALT SERPL W P-5'-P-CCNC: 21 U/L (ref 12–78)
ANION GAP SERPL CALCULATED.3IONS-SCNC: 7 MMOL/L (ref 4–13)
AST SERPL W P-5'-P-CCNC: 25 U/L (ref 5–45)
BASOPHILS # BLD AUTO: 0.03 THOUSANDS/ΜL (ref 0–0.1)
BASOPHILS NFR BLD AUTO: 0 % (ref 0–1)
BILIRUB SERPL-MCNC: 0.44 MG/DL (ref 0.2–1)
BUN SERPL-MCNC: 21 MG/DL (ref 5–25)
CALCIUM ALBUM COR SERPL-MCNC: 10.9 MG/DL (ref 8.3–10.1)
CALCIUM SERPL-MCNC: 10 MG/DL (ref 8.3–10.1)
CHLORIDE SERPL-SCNC: 105 MMOL/L (ref 100–108)
CHOLEST SERPL-MCNC: 133 MG/DL (ref 50–200)
CO2 SERPL-SCNC: 23 MMOL/L (ref 21–32)
CREAT SERPL-MCNC: 1.09 MG/DL (ref 0.6–1.3)
CREAT UR-MCNC: 294 MG/DL
EOSINOPHIL # BLD AUTO: 0.28 THOUSAND/ΜL (ref 0–0.61)
EOSINOPHIL NFR BLD AUTO: 4 % (ref 0–6)
ERYTHROCYTE [DISTWIDTH] IN BLOOD BY AUTOMATED COUNT: 12.4 % (ref 11.6–15.1)
EST. AVERAGE GLUCOSE BLD GHB EST-MCNC: 146 MG/DL
GFR SERPL CREATININE-BSD FRML MDRD: 52 ML/MIN/1.73SQ M
GLUCOSE P FAST SERPL-MCNC: 255 MG/DL (ref 65–99)
HBA1C MFR BLD: 6.7 %
HCT VFR BLD AUTO: 30.2 % (ref 34.8–46.1)
HDLC SERPL-MCNC: 55 MG/DL
HGB BLD-MCNC: 9.5 G/DL (ref 11.5–15.4)
IMM GRANULOCYTES # BLD AUTO: 0.02 THOUSAND/UL (ref 0–0.2)
IMM GRANULOCYTES NFR BLD AUTO: 0 % (ref 0–2)
LDLC SERPL CALC-MCNC: 61 MG/DL (ref 0–100)
LYMPHOCYTES # BLD AUTO: 2.03 THOUSANDS/ΜL (ref 0.6–4.47)
LYMPHOCYTES NFR BLD AUTO: 27 % (ref 14–44)
MCH RBC QN AUTO: 31.3 PG (ref 26.8–34.3)
MCHC RBC AUTO-ENTMCNC: 31.5 G/DL (ref 31.4–37.4)
MCV RBC AUTO: 99 FL (ref 82–98)
MICROALBUMIN UR-MCNC: 17.2 MG/L (ref 0–20)
MICROALBUMIN/CREAT 24H UR: 6 MG/G CREATININE (ref 0–30)
MONOCYTES # BLD AUTO: 0.65 THOUSAND/ΜL (ref 0.17–1.22)
MONOCYTES NFR BLD AUTO: 9 % (ref 4–12)
NEUTROPHILS # BLD AUTO: 4.55 THOUSANDS/ΜL (ref 1.85–7.62)
NEUTS SEG NFR BLD AUTO: 60 % (ref 43–75)
NONHDLC SERPL-MCNC: 78 MG/DL
NRBC BLD AUTO-RTO: 0 /100 WBCS
PLATELET # BLD AUTO: 553 THOUSANDS/UL (ref 149–390)
PMV BLD AUTO: 9.3 FL (ref 8.9–12.7)
POTASSIUM SERPL-SCNC: 5.2 MMOL/L (ref 3.5–5.3)
PROT SERPL-MCNC: 7 G/DL (ref 6.4–8.2)
RBC # BLD AUTO: 3.04 MILLION/UL (ref 3.81–5.12)
SODIUM SERPL-SCNC: 135 MMOL/L (ref 136–145)
TRIGL SERPL-MCNC: 87 MG/DL
WBC # BLD AUTO: 7.56 THOUSAND/UL (ref 4.31–10.16)

## 2021-11-17 PROCEDURE — 83036 HEMOGLOBIN GLYCOSYLATED A1C: CPT

## 2021-11-17 PROCEDURE — 80053 COMPREHEN METABOLIC PANEL: CPT

## 2021-11-17 PROCEDURE — 80061 LIPID PANEL: CPT

## 2021-11-17 PROCEDURE — 36415 COLL VENOUS BLD VENIPUNCTURE: CPT

## 2021-11-17 PROCEDURE — 82570 ASSAY OF URINE CREATININE: CPT

## 2021-11-17 PROCEDURE — 85025 COMPLETE CBC W/AUTO DIFF WBC: CPT

## 2021-11-17 PROCEDURE — 82043 UR ALBUMIN QUANTITATIVE: CPT

## 2021-11-23 ENCOUNTER — OFFICE VISIT (OUTPATIENT)
Dept: ENDOCRINOLOGY | Facility: HOSPITAL | Age: 66
End: 2021-11-23
Payer: MEDICARE

## 2021-11-23 VITALS
HEART RATE: 92 BPM | WEIGHT: 259.8 LBS | HEIGHT: 67 IN | SYSTOLIC BLOOD PRESSURE: 124 MMHG | BODY MASS INDEX: 40.78 KG/M2 | DIASTOLIC BLOOD PRESSURE: 82 MMHG

## 2021-11-23 DIAGNOSIS — E11.29 MICROALBUMINURIA DUE TO TYPE 2 DIABETES MELLITUS (HCC): ICD-10-CM

## 2021-11-23 DIAGNOSIS — R80.9 MICROALBUMINURIA DUE TO TYPE 2 DIABETES MELLITUS (HCC): ICD-10-CM

## 2021-11-23 DIAGNOSIS — Z79.4 TYPE 2 DIABETES MELLITUS WITH DIABETIC NEUROPATHY, WITH LONG-TERM CURRENT USE OF INSULIN (HCC): Primary | ICD-10-CM

## 2021-11-23 DIAGNOSIS — E11.40 TYPE 2 DIABETES MELLITUS WITH DIABETIC NEUROPATHY, WITH LONG-TERM CURRENT USE OF INSULIN (HCC): Primary | ICD-10-CM

## 2021-11-23 DIAGNOSIS — E78.2 MIXED HYPERLIPIDEMIA: ICD-10-CM

## 2021-11-23 DIAGNOSIS — I10 ESSENTIAL HYPERTENSION: ICD-10-CM

## 2021-11-23 PROCEDURE — 99215 OFFICE O/P EST HI 40 MIN: CPT | Performed by: INTERNAL MEDICINE

## 2022-02-20 DIAGNOSIS — I10 ESSENTIAL HYPERTENSION: ICD-10-CM

## 2022-02-20 DIAGNOSIS — R80.9 MICROALBUMINURIA: ICD-10-CM

## 2022-02-21 RX ORDER — LISINOPRIL 2.5 MG/1
TABLET ORAL
Qty: 90 TABLET | Refills: 3 | Status: SHIPPED | OUTPATIENT
Start: 2022-02-21

## 2022-02-23 ENCOUNTER — APPOINTMENT (OUTPATIENT)
Dept: LAB | Facility: CLINIC | Age: 67
End: 2022-02-23
Payer: MEDICARE

## 2022-02-23 DIAGNOSIS — E11.40 TYPE 2 DIABETES MELLITUS WITH DIABETIC NEUROPATHY, WITH LONG-TERM CURRENT USE OF INSULIN (HCC): ICD-10-CM

## 2022-02-23 DIAGNOSIS — Z79.4 TYPE 2 DIABETES MELLITUS WITH DIABETIC NEUROPATHY, WITH LONG-TERM CURRENT USE OF INSULIN (HCC): ICD-10-CM

## 2022-02-23 DIAGNOSIS — E11.9 DIABETES MELLITUS, STABLE (HCC): ICD-10-CM

## 2022-02-23 DIAGNOSIS — E78.2 MIXED HYPERLIPIDEMIA: ICD-10-CM

## 2022-02-23 DIAGNOSIS — R80.9 MICROALBUMINURIA DUE TO TYPE 2 DIABETES MELLITUS (HCC): ICD-10-CM

## 2022-02-23 DIAGNOSIS — E11.29 MICROALBUMINURIA DUE TO TYPE 2 DIABETES MELLITUS (HCC): ICD-10-CM

## 2022-02-23 DIAGNOSIS — I10 ESSENTIAL HYPERTENSION: ICD-10-CM

## 2022-02-23 LAB
ALBUMIN SERPL BCP-MCNC: 3.5 G/DL (ref 3.5–5)
ALP SERPL-CCNC: 55 U/L (ref 46–116)
ALT SERPL W P-5'-P-CCNC: 19 U/L (ref 12–78)
ANION GAP SERPL CALCULATED.3IONS-SCNC: 6 MMOL/L (ref 4–13)
AST SERPL W P-5'-P-CCNC: 13 U/L (ref 5–45)
BASOPHILS # BLD AUTO: 0.03 THOUSANDS/ΜL (ref 0–0.1)
BASOPHILS NFR BLD AUTO: 1 % (ref 0–1)
BILIRUB DIRECT SERPL-MCNC: 0.13 MG/DL (ref 0–0.2)
BILIRUB SERPL-MCNC: 0.62 MG/DL (ref 0.2–1)
BUN SERPL-MCNC: 19 MG/DL (ref 5–25)
CALCIUM SERPL-MCNC: 9.9 MG/DL (ref 8.3–10.1)
CHLORIDE SERPL-SCNC: 104 MMOL/L (ref 100–108)
CHOLEST SERPL-MCNC: 176 MG/DL
CK SERPL-CCNC: 46 U/L (ref 26–192)
CO2 SERPL-SCNC: 27 MMOL/L (ref 21–32)
CREAT SERPL-MCNC: 1.01 MG/DL (ref 0.6–1.3)
EOSINOPHIL # BLD AUTO: 0.39 THOUSAND/ΜL (ref 0–0.61)
EOSINOPHIL NFR BLD AUTO: 7 % (ref 0–6)
ERYTHROCYTE [DISTWIDTH] IN BLOOD BY AUTOMATED COUNT: 12.4 % (ref 11.6–15.1)
EST. AVERAGE GLUCOSE BLD GHB EST-MCNC: 131 MG/DL
GFR SERPL CREATININE-BSD FRML MDRD: 57 ML/MIN/1.73SQ M
GLUCOSE P FAST SERPL-MCNC: 108 MG/DL (ref 65–99)
HBA1C MFR BLD: 6.2 %
HCT VFR BLD AUTO: 34.1 % (ref 34.8–46.1)
HDLC SERPL-MCNC: 77 MG/DL
HGB BLD-MCNC: 10.8 G/DL (ref 11.5–15.4)
IMM GRANULOCYTES # BLD AUTO: 0.01 THOUSAND/UL (ref 0–0.2)
IMM GRANULOCYTES NFR BLD AUTO: 0 % (ref 0–2)
LDLC SERPL CALC-MCNC: 91 MG/DL (ref 0–100)
LYMPHOCYTES # BLD AUTO: 1.64 THOUSANDS/ΜL (ref 0.6–4.47)
LYMPHOCYTES NFR BLD AUTO: 28 % (ref 14–44)
MCH RBC QN AUTO: 29.9 PG (ref 26.8–34.3)
MCHC RBC AUTO-ENTMCNC: 31.7 G/DL (ref 31.4–37.4)
MCV RBC AUTO: 95 FL (ref 82–98)
MONOCYTES # BLD AUTO: 0.66 THOUSAND/ΜL (ref 0.17–1.22)
MONOCYTES NFR BLD AUTO: 11 % (ref 4–12)
NEUTROPHILS # BLD AUTO: 3.12 THOUSANDS/ΜL (ref 1.85–7.62)
NEUTS SEG NFR BLD AUTO: 53 % (ref 43–75)
NRBC BLD AUTO-RTO: 0 /100 WBCS
PLATELET # BLD AUTO: 323 THOUSANDS/UL (ref 149–390)
PMV BLD AUTO: 10 FL (ref 8.9–12.7)
POTASSIUM SERPL-SCNC: 4.6 MMOL/L (ref 3.5–5.3)
PROT SERPL-MCNC: 7.2 G/DL (ref 6.4–8.2)
RBC # BLD AUTO: 3.61 MILLION/UL (ref 3.81–5.12)
SODIUM SERPL-SCNC: 137 MMOL/L (ref 136–145)
TRIGL SERPL-MCNC: 42 MG/DL
TSH SERPL DL<=0.05 MIU/L-ACNC: 1.94 UIU/ML (ref 0.36–3.74)
WBC # BLD AUTO: 5.85 THOUSAND/UL (ref 4.31–10.16)

## 2022-02-23 PROCEDURE — 36415 COLL VENOUS BLD VENIPUNCTURE: CPT

## 2022-02-23 PROCEDURE — 80061 LIPID PANEL: CPT

## 2022-02-23 PROCEDURE — 82248 BILIRUBIN DIRECT: CPT

## 2022-02-23 PROCEDURE — 84443 ASSAY THYROID STIM HORMONE: CPT

## 2022-02-23 PROCEDURE — 83036 HEMOGLOBIN GLYCOSYLATED A1C: CPT

## 2022-02-23 PROCEDURE — 80053 COMPREHEN METABOLIC PANEL: CPT

## 2022-02-23 PROCEDURE — 85025 COMPLETE CBC W/AUTO DIFF WBC: CPT

## 2022-02-23 PROCEDURE — 82550 ASSAY OF CK (CPK): CPT

## 2022-03-02 ENCOUNTER — OFFICE VISIT (OUTPATIENT)
Dept: ENDOCRINOLOGY | Facility: HOSPITAL | Age: 67
End: 2022-03-02
Payer: MEDICARE

## 2022-03-02 VITALS
HEART RATE: 78 BPM | WEIGHT: 252 LBS | DIASTOLIC BLOOD PRESSURE: 100 MMHG | BODY MASS INDEX: 39.55 KG/M2 | HEIGHT: 67 IN | SYSTOLIC BLOOD PRESSURE: 162 MMHG

## 2022-03-02 DIAGNOSIS — I10 ESSENTIAL HYPERTENSION: ICD-10-CM

## 2022-03-02 DIAGNOSIS — N18.31 STAGE 3A CHRONIC KIDNEY DISEASE (HCC): ICD-10-CM

## 2022-03-02 DIAGNOSIS — E66.01 OBESITY, MORBID (HCC): ICD-10-CM

## 2022-03-02 DIAGNOSIS — Z79.4 TYPE 2 DIABETES MELLITUS WITH DIABETIC NEUROPATHY, WITH LONG-TERM CURRENT USE OF INSULIN (HCC): Primary | ICD-10-CM

## 2022-03-02 DIAGNOSIS — E11.40 TYPE 2 DIABETES MELLITUS WITH DIABETIC NEUROPATHY, WITH LONG-TERM CURRENT USE OF INSULIN (HCC): Primary | ICD-10-CM

## 2022-03-02 DIAGNOSIS — R80.9 MICROALBUMINURIA DUE TO TYPE 2 DIABETES MELLITUS (HCC): ICD-10-CM

## 2022-03-02 DIAGNOSIS — E78.2 MIXED HYPERLIPIDEMIA: ICD-10-CM

## 2022-03-02 DIAGNOSIS — R80.9 MICROALBUMINURIA: ICD-10-CM

## 2022-03-02 DIAGNOSIS — E11.29 MICROALBUMINURIA DUE TO TYPE 2 DIABETES MELLITUS (HCC): ICD-10-CM

## 2022-03-02 PROCEDURE — 99214 OFFICE O/P EST MOD 30 MIN: CPT | Performed by: NURSE PRACTITIONER

## 2022-03-02 RX ORDER — FLASH GLUCOSE SENSOR
1 KIT MISCELLANEOUS
Qty: 2 EACH | Refills: 6 | Status: SHIPPED | OUTPATIENT
Start: 2022-03-02

## 2022-03-02 RX ORDER — LISINOPRIL 5 MG/1
5 TABLET ORAL DAILY
COMMUNITY

## 2022-03-02 RX ORDER — FLASH GLUCOSE SCANNING READER
1 EACH MISCELLANEOUS
Qty: 1 EACH | Refills: 0 | Status: SHIPPED | OUTPATIENT
Start: 2022-03-02

## 2022-03-02 NOTE — PATIENT INSTRUCTIONS
Be mindful of diet       Stay active and stay hydrated       For now, continue current regimen      Please check your blood sugars 3-4 times daily and forward a record to the office in 1-2 weeks for review and further adjustment, as needed       Continue lasix and lisinopril for treatment of your blood pressure  Please contact cardiology for guidance on persistent hypertension      Continue Omega 3 fatty acid fish oil daily       Obtain lab work as prescribed

## 2022-03-02 NOTE — PROGRESS NOTES
Carly Romero 74 y o  female MRN: [id number]    Encounter: [id number]      Assessment/Plan     Assessment: This is a 74y o -year-old female with type 2 diabetes with neuropathy, hypertension and hyperlipidemia  Plan:  1   Type 2 diabetes insulin requiring:  Her most recent hemoglobin A1c is stable at 6  2   She presents with no blood sugar records or meter for download in the office today   For now, she will continue her current regimen   I have asked her to send a record of her blood sugars to the office in 2 weeks for review so further changes can be made to help her blood sugars throughout the day   Check hemoglobin A1c prior to next visit      2   Hypertension/microalbuminuria: Blood pressure is elevated in the office today  I have asked her to follow up with cardiology for guidance  Continue lisinopril   Check comprehensive metabolic panel prior to next visit      3   Hyperlipidemia: Continue Omega 3 fatty acid fish oil  Check fasting lipid panel prior to next visit  CC:   Type 2 Diabetes follow-up    History of Present Illness     HPI:  72 y  o  year old female with type 2 diabetes for approximately 18 years   She is on oral agents and insulin at home and takes Metformin 1000 mg twice daily, Levemir insulin 18-20 units at bedtime, and Humalog insulin 4-6 units at breakfast and lunch, 2-3 units in the afternoon, and 5-7 units at supper  Her most recent hemoglobin A1c from February 23, 2022 is 6 2   She presents with no blood sugars or meter for download  She denies any polydipsia, and blurry vision   She has no polyphagia  Walls Tam has 2-3 times per night nocturia and polyuria  She ones one or two episodes of hypoglycemia occasionally (rarely) past few months  She has no chest pain or shortness of breath   She denies nephropathy, retinopathy, heart attack, stroke and claudication but does admit to neuropathy  Her  passed away in June 2020      Her most recent diabetic eye exam was December 2,  and no retinopathy  She complains of some numbness and tingling to her feet at times   Her most recent diabetic foot exam was performed on 2020 at endocrine office visit      Her hypertension is treated with Lasix 40 mg daily and lisinopril 5 mg daily  Cardiology recent increased Lisinopril      For her hyperlipidemia, she is taking Omega 3 fatty acid fish oil 1000 mg daily        Review of Systems   Constitutional: Negative  Negative for chills, fatigue and fever  HENT: Negative  Negative for trouble swallowing and voice change  Eyes: Negative  Negative for visual disturbance  Respiratory: Negative  Negative for chest tightness and shortness of breath  Cardiovascular: Positive for leg swelling  Negative for chest pain  Gastrointestinal: Negative  Negative for abdominal pain, constipation, diarrhea and vomiting  Endocrine: Positive for polyuria ( 2-3 times per night nocturia)  Negative for cold intolerance, heat intolerance, polydipsia and polyphagia  Genitourinary: Negative  Musculoskeletal: Positive for arthralgias ( attributed to arthritis) and myalgias  Skin: Negative  Allergic/Immunologic: Negative  Neurological: Negative  Negative for dizziness, syncope, light-headedness and headaches  Hematological: Negative  Psychiatric/Behavioral: Negative  All other systems reviewed and are negative        Historical Information   Past Medical History:   Diagnosis Date    Asthma     seasonal    Diabetes mellitus ([facility] [address] )     Osteoarthritis     Tremor, hereditary, benign      Past Surgical History:   Procedure Laterality Date     SECTION      3    REPLACEMENT TOTAL KNEE Left 2021    TOE SURGERY Bilateral     5 th toe bone removed    TOTAL HIP ARTHROPLASTY Right     TUBAL LIGATION Bilateral      Social History   Social History     Substance and Sexual Activity   Alcohol Use Yes    Comment: socially     Social History     Substance and Sexual Activity Drug Use No     Social History     Tobacco Use   Smoking Status Former Smoker    Packs/day: 1 00    Years: 15 00    Pack years: 15 00    Types: Cigarettes    Quit date: 2007    Years since quittin 7   Smokeless Tobacco Never Used     Family History:   Family History   Problem Relation Age of Onset    Cirrhosis Mother     Heart disease Mother     Esophageal varices Mother     Brain cancer Father     Prostate cancer Father     Stroke Father     Heart attack Father     Skin cancer Brother     COPD Brother     Diabetes type II Maternal Aunt     Diabetes type II Maternal Grandfather     Parkinsonism Maternal Grandfather     Coronary artery disease Brother     Prostate cancer Brother     Post-traumatic stress disorder Son     No Known Problems Son     No Known Problems Daughter     Parkinsonism Maternal Aunt     Diabetes type I Cousin        Meds/Allergies   Current Outpatient Medications   Medication Sig Dispense Refill    albuterol (PROVENTIL HFA) 90 mcg/act inhaler Inhale      aspirin 81 MG tablet Take 81 mg by mouth 2 (two) times a day      BD ULTRA-FINE PEN NEEDLES 29G X 12 7MM MISC Utilize 4 times daily 450 each 3    Calcium Carbonate (CALCIUM 600 PO) Take 2 tablets by mouth daily With vitamin d      docusate sodium (COLACE) 100 mg capsule Take 100 mg by mouth daily       ferrous sulfate 325 (65 Fe) mg tablet Take 325 mg by mouth 2 (two) times a day      furosemide (LASIX) 40 mg tablet TAKE 1 TABLET BY MOUTH  DAILY AS NEEDED 90 tablet 3    insulin lispro (HumaLOG KwikPen) 100 units/mL injection pen INJECT SUBCUTANEOUSLY 2-7  UNITS 4 TIMES DAILY AS  DIRECTED 30 mL 4    Levemir FlexTouch 100 units/mL injection pen INJECT 18 TO 20 UNITS  SUBCUTANEOUSLY DAILY AT  BEDTIME 30 mL 5    lisinopril (ZESTRIL) 5 mg tablet Take 5 mg by mouth daily      metFORMIN (GLUCOPHAGE) 1000 MG tablet Take 1 tablet (1,000 mg total) by mouth 2 (two) times a day with meals 180 tablet 3    Multiple Vitamins-Minerals (WOMENS 50+ MULTI VITAMIN/MIN PO) Take by mouth      Omega-3 Fatty Acids (FISH OIL) 1,000 mg Take 1,000 mg by mouth daily      TRUE METRIX BLOOD GLUCOSE TEST test strip Use as instructed up to 4 times a day 400 each 3    vitamin E, tocopherol, 400 units capsule Take 400 Units by mouth daily      lisinopril (ZESTRIL) 2 5 mg tablet TAKE 1 TABLET BY MOUTH  DAILY (Patient not taking: Reported on 3/2/2022) 90 tablet 3     No current facility-administered medications for this visit  Allergies   Allergen Reactions    Actos [Pioglitazone] Edema     Severe lower extremity    Gentamicin     Other     Oxycodone GI Intolerance    Sulfa Antibiotics        Objective   Vitals: Blood pressure 162/100, pulse 78, height 5' 7 32" (1 71 m), weight 114 kg (252 lb)  Physical Exam  Vitals reviewed  Constitutional:       Appearance: She is well-developed  She is obese  HENT:      Head: Normocephalic and atraumatic  Eyes:      Conjunctiva/sclera: Conjunctivae normal       Pupils: Pupils are equal, round, and reactive to light  Comments:  Wears glasses   Cardiovascular:      Rate and Rhythm: Normal rate and regular rhythm  Heart sounds: Normal heart sounds  Pulmonary:      Effort: Pulmonary effort is normal       Breath sounds: Normal breath sounds  Abdominal:      General: Bowel sounds are normal       Palpations: Abdomen is soft  Musculoskeletal:         General: Normal range of motion  Cervical back: Normal range of motion and neck supple  Right lower leg: Edema present  Left lower leg: Edema present  Skin:     General: Skin is warm and dry  Neurological:      Mental Status: She is alert and oriented to person, place, and time  Psychiatric:         Behavior: Behavior normal          Thought Content:  Thought content normal          Judgment: Judgment normal        Lab Results:   Lab Results   Component Value Date/Time    Hemoglobin A1C 6 2 (H) 02/23/2022 09:12 AM    Hemoglobin A1C 6 7 (H) 11/17/2021 08:32 AM    Hemoglobin A1C 6 2 (H) 08/12/2021 08:50 AM    WBC 5 85 02/23/2022 09:12 AM    WBC 7 56 11/17/2021 08:32 AM    WBC 6 97 05/03/2021 07:37 AM    Hemoglobin 10 8 (L) 02/23/2022 09:12 AM    Hemoglobin 9 5 (L) 11/17/2021 08:32 AM    Hemoglobin 11 7 05/03/2021 07:37 AM    Hematocrit 34 1 (L) 02/23/2022 09:12 AM    Hematocrit 30 2 (L) 11/17/2021 08:32 AM    Hematocrit 36 9 05/03/2021 07:37 AM    MCV 95 02/23/2022 09:12 AM    MCV 99 (H) 11/17/2021 08:32 AM    MCV 97 05/03/2021 07:37 AM    Platelets 818 36/24/1254 09:12 AM    Platelets 415 (H) 37/59/4835 08:32 AM    Platelets 735 29/88/5807 07:37 AM    BUN 19 02/23/2022 09:12 AM    BUN 21 11/17/2021 08:32 AM    BUN 18 08/12/2021 08:50 AM    Potassium 4 6 02/23/2022 09:12 AM    Potassium 5 2 11/17/2021 08:32 AM    Potassium 5 2 08/12/2021 08:50 AM    Chloride 104 02/23/2022 09:12 AM    Chloride 105 11/17/2021 08:32 AM    Chloride 103 08/12/2021 08:50 AM    CO2 27 02/23/2022 09:12 AM    CO2 23 11/17/2021 08:32 AM    CO2 29 08/12/2021 08:50 AM    Creatinine 1 01 02/23/2022 09:12 AM    Creatinine 1 09 11/17/2021 08:32 AM    Creatinine 1 25 08/12/2021 08:50 AM    AST 13 02/23/2022 09:12 AM    AST 25 11/17/2021 08:32 AM    AST 13 08/12/2021 08:50 AM    ALT 19 02/23/2022 09:12 AM    ALT 21 11/17/2021 08:32 AM    ALT 15 08/12/2021 08:50 AM    Albumin 3 5 02/23/2022 09:12 AM    Albumin 2 9 (L) 11/17/2021 08:32 AM    Albumin 3 4 (L) 08/12/2021 08:50 AM    HDL, Direct 77 02/23/2022 09:12 AM    HDL, Direct 55 11/17/2021 08:32 AM    HDL, Direct 69 05/03/2021 07:37 AM    Triglycerides 42 02/23/2022 09:12 AM    Triglycerides 87 11/17/2021 08:32 AM    Triglycerides 47 05/03/2021 07:37 AM       Portions of the record may have been created with voice recognition software  Occasional wrong word or "sound a like" substitutions may have occurred due to the inherent limitations of voice recognition software   Read the chart carefully and recognize, using context, where substitutions have occurred

## 2022-03-07 ENCOUNTER — DOCUMENTATION (OUTPATIENT)
Dept: ENDOCRINOLOGY | Facility: HOSPITAL | Age: 67
End: 2022-03-07

## 2022-03-07 NOTE — PROGRESS NOTES
Order for [employer] supplies and last chart note faxed to [address] [facility] at [phone]

## 2022-03-22 DIAGNOSIS — E11.40 TYPE 2 DIABETES MELLITUS WITH DIABETIC NEUROPATHY, WITH LONG-TERM CURRENT USE OF INSULIN (HCC): ICD-10-CM

## 2022-03-22 DIAGNOSIS — Z79.4 TYPE 2 DIABETES MELLITUS WITH DIABETIC NEUROPATHY, WITH LONG-TERM CURRENT USE OF INSULIN (HCC): ICD-10-CM

## 2022-04-06 DIAGNOSIS — E11.40 TYPE 2 DIABETES MELLITUS WITH DIABETIC NEUROPATHY, WITH LONG-TERM CURRENT USE OF INSULIN (HCC): ICD-10-CM

## 2022-04-06 DIAGNOSIS — Z79.4 TYPE 2 DIABETES MELLITUS WITH DIABETIC NEUROPATHY, WITH LONG-TERM CURRENT USE OF INSULIN (HCC): ICD-10-CM

## 2022-04-06 RX ORDER — PEN NEEDLE, DIABETIC 29 G X1/2"
NEEDLE, DISPOSABLE MISCELLANEOUS
Qty: 360 EACH | Refills: 3 | Status: SHIPPED | OUTPATIENT
Start: 2022-04-06

## 2022-04-08 ENCOUNTER — TELEPHONE (OUTPATIENT)
Dept: ENDOCRINOLOGY | Facility: HOSPITAL | Age: 67
End: 2022-04-08

## 2022-04-08 NOTE — TELEPHONE ENCOUNTER
Received voicemail from patient  She reports the [place] sensor that was placed in our office on Tuesday is no longer working  She will replace the sensor and has reached out to [facility] as well  She left the message as a FYI

## 2022-05-31 ENCOUNTER — DOCUMENTATION (OUTPATIENT)
Dept: ENDOCRINOLOGY | Facility: HOSPITAL | Age: 67
End: 2022-05-31

## 2022-06-05 DIAGNOSIS — Z79.4 TYPE 2 DIABETES MELLITUS WITH DIABETIC NEUROPATHY, WITH LONG-TERM CURRENT USE OF INSULIN (HCC): ICD-10-CM

## 2022-06-05 DIAGNOSIS — E11.40 TYPE 2 DIABETES MELLITUS WITH DIABETIC NEUROPATHY, WITH LONG-TERM CURRENT USE OF INSULIN (HCC): ICD-10-CM

## 2022-06-07 RX ORDER — INSULIN LISPRO 100 [IU]/ML
INJECTION, SOLUTION INTRAVENOUS; SUBCUTANEOUS
Qty: 30 ML | Refills: 4 | Status: SHIPPED | OUTPATIENT
Start: 2022-06-07

## 2022-06-10 ENCOUNTER — LAB (OUTPATIENT)
Dept: LAB | Facility: CLINIC | Age: 67
End: 2022-06-10
Payer: MEDICARE

## 2022-06-10 DIAGNOSIS — Z79.4 TYPE 2 DIABETES MELLITUS WITH DIABETIC NEUROPATHY, WITH LONG-TERM CURRENT USE OF INSULIN (HCC): ICD-10-CM

## 2022-06-10 DIAGNOSIS — E11.29 MICROALBUMINURIA DUE TO TYPE 2 DIABETES MELLITUS (HCC): ICD-10-CM

## 2022-06-10 DIAGNOSIS — I10 ESSENTIAL HYPERTENSION: ICD-10-CM

## 2022-06-10 DIAGNOSIS — R80.9 MICROALBUMINURIA: ICD-10-CM

## 2022-06-10 DIAGNOSIS — E11.40 TYPE 2 DIABETES MELLITUS WITH DIABETIC NEUROPATHY, WITH LONG-TERM CURRENT USE OF INSULIN (HCC): ICD-10-CM

## 2022-06-10 DIAGNOSIS — E78.2 MIXED HYPERLIPIDEMIA: ICD-10-CM

## 2022-06-10 DIAGNOSIS — R80.9 MICROALBUMINURIA DUE TO TYPE 2 DIABETES MELLITUS (HCC): ICD-10-CM

## 2022-06-10 LAB
ALBUMIN SERPL BCP-MCNC: 3.4 G/DL (ref 3.5–5)
ALP SERPL-CCNC: 59 U/L (ref 46–116)
ALT SERPL W P-5'-P-CCNC: 18 U/L (ref 12–78)
ANION GAP SERPL CALCULATED.3IONS-SCNC: 1 MMOL/L (ref 4–13)
AST SERPL W P-5'-P-CCNC: 18 U/L (ref 5–45)
BASOPHILS # BLD AUTO: 0.04 THOUSANDS/ΜL (ref 0–0.1)
BASOPHILS NFR BLD AUTO: 1 % (ref 0–1)
BILIRUB SERPL-MCNC: 0.51 MG/DL (ref 0.2–1)
BUN SERPL-MCNC: 14 MG/DL (ref 5–25)
CALCIUM ALBUM COR SERPL-MCNC: 10.6 MG/DL (ref 8.3–10.1)
CALCIUM SERPL-MCNC: 10.1 MG/DL (ref 8.3–10.1)
CHLORIDE SERPL-SCNC: 106 MMOL/L (ref 100–108)
CO2 SERPL-SCNC: 31 MMOL/L (ref 21–32)
CREAT SERPL-MCNC: 1.08 MG/DL (ref 0.6–1.3)
EOSINOPHIL # BLD AUTO: 0.54 THOUSAND/ΜL (ref 0–0.61)
EOSINOPHIL NFR BLD AUTO: 9 % (ref 0–6)
ERYTHROCYTE [DISTWIDTH] IN BLOOD BY AUTOMATED COUNT: 12.4 % (ref 11.6–15.1)
EST. AVERAGE GLUCOSE BLD GHB EST-MCNC: 114 MG/DL
GFR SERPL CREATININE-BSD FRML MDRD: 52 ML/MIN/1.73SQ M
GLUCOSE P FAST SERPL-MCNC: 79 MG/DL (ref 65–99)
HBA1C MFR BLD: 5.6 %
HCT VFR BLD AUTO: 34.4 % (ref 34.8–46.1)
HGB BLD-MCNC: 11.3 G/DL (ref 11.5–15.4)
IMM GRANULOCYTES # BLD AUTO: 0.01 THOUSAND/UL (ref 0–0.2)
IMM GRANULOCYTES NFR BLD AUTO: 0 % (ref 0–2)
LYMPHOCYTES # BLD AUTO: 2.22 THOUSANDS/ΜL (ref 0.6–4.47)
LYMPHOCYTES NFR BLD AUTO: 36 % (ref 14–44)
MCH RBC QN AUTO: 31.1 PG (ref 26.8–34.3)
MCHC RBC AUTO-ENTMCNC: 32.8 G/DL (ref 31.4–37.4)
MCV RBC AUTO: 95 FL (ref 82–98)
MONOCYTES # BLD AUTO: 0.59 THOUSAND/ΜL (ref 0.17–1.22)
MONOCYTES NFR BLD AUTO: 10 % (ref 4–12)
NEUTROPHILS # BLD AUTO: 2.7 THOUSANDS/ΜL (ref 1.85–7.62)
NEUTS SEG NFR BLD AUTO: 44 % (ref 43–75)
NRBC BLD AUTO-RTO: 0 /100 WBCS
PLATELET # BLD AUTO: 285 THOUSANDS/UL (ref 149–390)
PMV BLD AUTO: 9.8 FL (ref 8.9–12.7)
POTASSIUM SERPL-SCNC: 4.5 MMOL/L (ref 3.5–5.3)
PROT SERPL-MCNC: 6.8 G/DL (ref 6.4–8.2)
RBC # BLD AUTO: 3.63 MILLION/UL (ref 3.81–5.12)
SODIUM SERPL-SCNC: 138 MMOL/L (ref 136–145)
WBC # BLD AUTO: 6.1 THOUSAND/UL (ref 4.31–10.16)

## 2022-06-10 PROCEDURE — 83036 HEMOGLOBIN GLYCOSYLATED A1C: CPT

## 2022-06-10 PROCEDURE — 36415 COLL VENOUS BLD VENIPUNCTURE: CPT

## 2022-06-10 PROCEDURE — 85025 COMPLETE CBC W/AUTO DIFF WBC: CPT

## 2022-06-10 PROCEDURE — 80053 COMPREHEN METABOLIC PANEL: CPT

## 2022-06-14 ENCOUNTER — OFFICE VISIT (OUTPATIENT)
Dept: ENDOCRINOLOGY | Facility: HOSPITAL | Age: 67
End: 2022-06-14
Payer: MEDICARE

## 2022-06-14 VITALS
HEIGHT: 67 IN | SYSTOLIC BLOOD PRESSURE: 136 MMHG | DIASTOLIC BLOOD PRESSURE: 90 MMHG | WEIGHT: 251 LBS | BODY MASS INDEX: 39.39 KG/M2 | HEART RATE: 71 BPM

## 2022-06-14 DIAGNOSIS — E78.2 MIXED HYPERLIPIDEMIA: ICD-10-CM

## 2022-06-14 DIAGNOSIS — R80.9 MICROALBUMINURIA DUE TO TYPE 2 DIABETES MELLITUS (HCC): ICD-10-CM

## 2022-06-14 DIAGNOSIS — E11.29 MICROALBUMINURIA DUE TO TYPE 2 DIABETES MELLITUS (HCC): ICD-10-CM

## 2022-06-14 DIAGNOSIS — N18.31 STAGE 3A CHRONIC KIDNEY DISEASE (HCC): ICD-10-CM

## 2022-06-14 DIAGNOSIS — E11.40 TYPE 2 DIABETES MELLITUS WITH DIABETIC NEUROPATHY, WITH LONG-TERM CURRENT USE OF INSULIN (HCC): Primary | ICD-10-CM

## 2022-06-14 DIAGNOSIS — I10 ESSENTIAL HYPERTENSION: ICD-10-CM

## 2022-06-14 DIAGNOSIS — Z79.4 TYPE 2 DIABETES MELLITUS WITH DIABETIC NEUROPATHY, WITH LONG-TERM CURRENT USE OF INSULIN (HCC): Primary | ICD-10-CM

## 2022-06-14 PROCEDURE — 99214 OFFICE O/P EST MOD 30 MIN: CPT | Performed by: NURSE PRACTITIONER

## 2022-06-14 RX ORDER — ROSUVASTATIN CALCIUM 10 MG/1
10 TABLET, COATED ORAL DAILY
COMMUNITY
Start: 2022-05-13

## 2022-06-14 NOTE — PATIENT INSTRUCTIONS
Be mindful of diet  Stay active and stay hydrated  For now, continue current regimen with Metformin and Humalog  Take your Humalog BEFORE MEALS!!!!!!!!!!!!    Decrease Levemir to 15 units  Please check your blood sugars 3-4 times daily and forward a record to the office in 1-2 weeks for review and further adjustment, as needed  Continue lasix and lisinopril for treatment of your blood pressure  Continue Omega 3 fatty acid fish oil daily  Obtain lab work as prescribed  Your calcium was elevated on recent lab work  Take one calcium tablet daily

## 2022-06-14 NOTE — PROGRESS NOTES
Jerrie Gilford 74 y o  female MRN: [id number]    Encounter: [id number]      Assessment/Plan     Assessment: This is a 74y o -year-old female with type 2 diabetes with neuropathy, hypertension and hyperlipidemia  Plan:  1   Type 2 diabetes insulin requiring:  Her most recent hemoglobin A1c is 5 6  She is experiencing some hypoglycemia overnight and throughout the day  I have asked her to decrease her Levemir to 15 units and reminded her to take her Humalog approximately 10 minutes before her meals as she has been taking it after her meals which is most likely contributing to her hypoglycemia  I have asked her to send a record of her blood sugars to the office in 2 weeks for review so further changes can be made to help her blood sugars throughout the day   Check hemoglobin A1c prior to next visit      2   Hypertension/microalbuminuria:  Managed by Cardiology  Continue lisinopril and Lasix   Check comprehensive metabolic panel prior to next visit      3   Hyperlipidemia: Continue Omega 3 fatty acid fish oil  Check fasting lipid panel prior to next visit  CC:  Type 2 Diabetes follow-up    History of Present Illness     HPI:  72 y  o  year old female with type 2 diabetes for approximately 18 years   She is on oral agents and insulin at home and takes Metformin 1000 mg twice daily, Levemir insulin 18-20 units at bedtime, and Humalog insulin 4-6 units at breakfast and lunch, 2-3 units in the afternoon, and 5-7 units at supper  Her most recent hemoglobin A1c from Diane 10, 2022 is 5 6  She denies any polydipsia, and blurry vision  Annamarie Fitting has no polyphagia  Annamarie Fitting has 2-3 times per night nocturia and polyuria  She notes hypoglycemia after taking Humalog after meals and sometimes over night  She has no chest pain or shortness of breath   She denies nephropathy, retinopathy, heart attack, stroke and claudication but does admit to neuropathy  Her  passed away in June 2020      Her most recent diabetic eye exam was 1932 ZRV no retinopathy  She complains of some numbness and tingling to her feet at times   Her most recent diabetic foot exam was performed on 2021 at endocrine office visit      Her hypertension is treated with Lasix 40 mg daily and lisinopril 5 mg daily       For her hyperlipidemia, she is taking Omega 3 fatty acid fish oil 1000 mg daily           Review of Systems   Constitutional: Negative  Negative for chills, fatigue and fever  HENT: Negative  Negative for trouble swallowing and voice change  Eyes: Negative  Negative for photophobia, pain, discharge, redness, itching and visual disturbance  Respiratory: Negative  Negative for chest tightness and shortness of breath  Cardiovascular: Positive for leg swelling (Lymphedema)  Negative for chest pain  Gastrointestinal: Negative  Negative for abdominal pain, constipation, diarrhea and vomiting  Endocrine: Positive for polyuria ( twice per night nocturia)  Negative for cold intolerance, heat intolerance, polydipsia and polyphagia  Genitourinary: Negative  Musculoskeletal: Positive for arthralgias ( arthritis)  Skin: Negative  Allergic/Immunologic: Negative  Neurological: Negative  Negative for dizziness, syncope, light-headedness and headaches  Hematological: Negative  Psychiatric/Behavioral: Negative  All other systems reviewed and are negative        Historical Information   Past Medical History:   Diagnosis Date    Asthma     seasonal    Diabetes mellitus ([facility] [address] )     Osteoarthritis     Tremor, hereditary, benign      Past Surgical History:   Procedure Laterality Date     SECTION      3    REPLACEMENT TOTAL KNEE Left 2021    TOE SURGERY Bilateral     5 th toe bone removed    TOTAL HIP ARTHROPLASTY Right     TUBAL LIGATION Bilateral      Social History   Social History     Substance and Sexual Activity   Alcohol Use Yes    Comment: socially     Social History     Substance and Sexual Activity   Drug Use No     Social History     Tobacco Use   Smoking Status Former Smoker    Packs/day: 1 00    Years: 15 00    Pack years: 15 00    Types: Cigarettes    Quit date: 6/1/2007    Years since quitting: 15 0   Smokeless Tobacco Never Used     Family History:   Family History   Problem Relation Age of Onset    Cirrhosis Mother     Heart disease Mother     Esophageal varices Mother     Brain cancer Father     Prostate cancer Father     Stroke Father     Heart attack Father     Skin cancer Brother     COPD Brother     Diabetes type II Maternal Aunt     Diabetes type II Maternal Grandfather     Parkinsonism Maternal Grandfather     Coronary artery disease Brother     Prostate cancer Brother     Post-traumatic stress disorder Son     No Known Problems Son     No Known Problems Daughter     Parkinsonism Maternal Aunt     Diabetes type I Cousin        Meds/Allergies   Current Outpatient Medications   Medication Sig Dispense Refill    albuterol (PROVENTIL HFA) 90 mcg/act inhaler Inhale      aspirin 81 MG tablet Take 81 mg by mouth 2 (two) times a day      BD ULTRA-FINE PEN NEEDLES 29G X 12 7MM MISC USE 4 TIMES DAILY 360 each 3    Calcium Carbonate (CALCIUM 600 PO) Take 2 tablets by mouth daily With vitamin d      Continuous Blood Gluc  (FreeStyle Mae 14 Day [place]) BARRIE Use 1 Device 4 (four) times a day (before meals and at bedtime) 1 each 0    Continuous Blood Gluc Sensor (FreeStyle Mae 14 Day Sensor) MISC Use 1 application every 14 (fourteen) days 2 each 6    docusate sodium (COLACE) 100 mg capsule Take 100 mg by mouth daily       ferrous sulfate 325 (65 Fe) mg tablet Take 325 mg by mouth 2 (two) times a day      furosemide (LASIX) 40 mg tablet TAKE 1 TABLET BY MOUTH  DAILY AS NEEDED 90 tablet 3    insulin lispro (HumaLOG KwikPen) 100 units/mL injection pen INJECT SUBCUTANEOUSLY 2 TO  7 UNITS 4 TIMES DAILY AS  DIRECTED 30 mL 4    Levemir FlexTouch 100 units/mL injection pen INJECT 18 TO 20 UNITS  SUBCUTANEOUSLY DAILY AT  BEDTIME 30 mL 5    lisinopril (ZESTRIL) 2 5 mg tablet TAKE 1 TABLET BY MOUTH  DAILY (Patient not taking: Reported on 3/2/2022) 90 tablet 3    lisinopril (ZESTRIL) 5 mg tablet Take 5 mg by mouth daily      metFORMIN (GLUCOPHAGE) 1000 MG tablet TAKE 1 TABLET BY MOUTH  TWICE DAILY WITH MEALS 180 tablet 3    Multiple Vitamins-Minerals (WOMENS 50+ MULTI VITAMIN/MIN PO) Take by mouth      Omega-3 Fatty Acids (FISH OIL) 1,000 mg Take 1,000 mg by mouth daily      TRUE METRIX BLOOD GLUCOSE TEST test strip Use as instructed up to 4 times a day 400 each 3    vitamin E, tocopherol, 400 units capsule Take 400 Units by mouth daily       No current facility-administered medications for this visit  Allergies   Allergen Reactions    Actos [Pioglitazone] Edema     Severe lower extremity    Gentamicin     Other     Oxycodone GI Intolerance    Sulfa Antibiotics        Objective   Vitals: Blood pressure 136/90, pulse 71, height 5' 7 32" (1 71 m), weight 114 kg (251 lb)  Physical Exam  Vitals reviewed  Constitutional:       Appearance: She is well-developed  She is obese  HENT:      Head: Normocephalic and atraumatic  Eyes:      Conjunctiva/sclera: Conjunctivae normal       Pupils: Pupils are equal, round, and reactive to light  Comments: Wears glasses   Cardiovascular:      Rate and Rhythm: Normal rate and regular rhythm  Heart sounds: Normal heart sounds  Pulmonary:      Effort: Pulmonary effort is normal       Breath sounds: Normal breath sounds  Abdominal:      General: Bowel sounds are normal       Palpations: Abdomen is soft  Musculoskeletal:         General: Normal range of motion  Cervical back: Normal range of motion and neck supple  Right lower leg: Edema present  Left lower leg: Edema present  Skin:     General: Skin is warm and dry     Neurological:      Mental Status: She is alert and oriented to person, place, and time  Psychiatric:         Behavior: Behavior normal          Thought Content:  Thought content normal          Judgment: Judgment normal        Lab Results:   Lab Results   Component Value Date/Time    Hemoglobin A1C 5 6 06/10/2022 09:11 AM    Hemoglobin A1C 6 2 (H) 02/23/2022 09:12 AM    Hemoglobin A1C 6 7 (H) 11/17/2021 08:32 AM    WBC 6 10 06/10/2022 09:11 AM    WBC 5 85 02/23/2022 09:12 AM    WBC 7 56 11/17/2021 08:32 AM    Hemoglobin 11 3 (L) 06/10/2022 09:11 AM    Hemoglobin 10 8 (L) 02/23/2022 09:12 AM    Hemoglobin 9 5 (L) 11/17/2021 08:32 AM    Hematocrit 34 4 (L) 06/10/2022 09:11 AM    Hematocrit 34 1 (L) 02/23/2022 09:12 AM    Hematocrit 30 2 (L) 11/17/2021 08:32 AM    MCV 95 06/10/2022 09:11 AM    MCV 95 02/23/2022 09:12 AM    MCV 99 (H) 11/17/2021 08:32 AM    Platelets 159 16/42/8958 09:11 AM    Platelets 148 90/90/7162 09:12 AM    Platelets 593 (H) 85/61/4549 08:32 AM    BUN 14 06/10/2022 09:11 AM    BUN 19 02/23/2022 09:12 AM    BUN 21 11/17/2021 08:32 AM    Potassium 4 5 06/10/2022 09:11 AM    Potassium 4 6 02/23/2022 09:12 AM    Potassium 5 2 11/17/2021 08:32 AM    Chloride 106 06/10/2022 09:11 AM    Chloride 104 02/23/2022 09:12 AM    Chloride 105 11/17/2021 08:32 AM    CO2 31 06/10/2022 09:11 AM    CO2 27 02/23/2022 09:12 AM    CO2 23 11/17/2021 08:32 AM    Creatinine 1 08 06/10/2022 09:11 AM    Creatinine 1 01 02/23/2022 09:12 AM    Creatinine 1 09 11/17/2021 08:32 AM    AST 18 06/10/2022 09:11 AM    AST 13 02/23/2022 09:12 AM    AST 25 11/17/2021 08:32 AM    ALT 18 06/10/2022 09:11 AM    ALT 19 02/23/2022 09:12 AM    ALT 21 11/17/2021 08:32 AM    Albumin 3 4 (L) 06/10/2022 09:11 AM    Albumin 3 5 02/23/2022 09:12 AM    Albumin 2 9 (L) 11/17/2021 08:32 AM    HDL, Direct 77 02/23/2022 09:12 AM    HDL, Direct 55 11/17/2021 08:32 AM    Triglycerides 42 02/23/2022 09:12 AM    Triglycerides 87 11/17/2021 08:32 AM     Portions of the record may have been created with voice recognition software  Occasional wrong word or "sound a like" substitutions may have occurred due to the inherent limitations of voice recognition software  Read the chart carefully and recognize, using context, where substitutions have occurred

## 2022-06-24 ENCOUNTER — DOCUMENTATION (OUTPATIENT)
Dept: ENDOCRINOLOGY | Facility: HOSPITAL | Age: 67
End: 2022-06-24

## 2022-07-12 NOTE — RESULT ENCOUNTER NOTE
To be discussed at upcoming office visit  [Negative] : Heme/Lymph [Fever] : no fever [Weight Gain (___ Lbs)] : no recent weight gain [Chills] : no chills [Feeling Fatigued] : not feeling fatigued [Weight Loss (___ Lbs)] : no recent weight loss [SOB] : no shortness of breath [Dyspnea on exertion] : not dyspnea during exertion [Chest Discomfort] : no chest discomfort [Orthopnea] : no orthopnea [Syncope] : no syncope [Cough] : no cough

## 2022-09-08 ENCOUNTER — APPOINTMENT (OUTPATIENT)
Dept: LAB | Facility: CLINIC | Age: 67
End: 2022-09-08
Payer: MEDICARE

## 2022-09-08 DIAGNOSIS — E78.2 MIXED HYPERLIPIDEMIA: ICD-10-CM

## 2022-09-08 DIAGNOSIS — E11.29 MICROALBUMINURIA DUE TO TYPE 2 DIABETES MELLITUS (HCC): ICD-10-CM

## 2022-09-08 DIAGNOSIS — Z79.4 TYPE 2 DIABETES MELLITUS WITH DIABETIC NEUROPATHY, WITH LONG-TERM CURRENT USE OF INSULIN (HCC): ICD-10-CM

## 2022-09-08 DIAGNOSIS — R80.9 MICROALBUMINURIA DUE TO TYPE 2 DIABETES MELLITUS (HCC): ICD-10-CM

## 2022-09-08 DIAGNOSIS — N18.31 STAGE 3A CHRONIC KIDNEY DISEASE (HCC): ICD-10-CM

## 2022-09-08 DIAGNOSIS — I10 ESSENTIAL HYPERTENSION: ICD-10-CM

## 2022-09-08 DIAGNOSIS — E11.40 TYPE 2 DIABETES MELLITUS WITH DIABETIC NEUROPATHY, WITH LONG-TERM CURRENT USE OF INSULIN (HCC): ICD-10-CM

## 2022-09-08 LAB
ALBUMIN SERPL BCP-MCNC: 3.5 G/DL (ref 3.5–5)
ALP SERPL-CCNC: 56 U/L (ref 46–116)
ALT SERPL W P-5'-P-CCNC: 20 U/L (ref 12–78)
ANION GAP SERPL CALCULATED.3IONS-SCNC: 3 MMOL/L (ref 4–13)
AST SERPL W P-5'-P-CCNC: 13 U/L (ref 5–45)
BASOPHILS # BLD AUTO: 0.03 THOUSANDS/ΜL (ref 0–0.1)
BASOPHILS NFR BLD AUTO: 1 % (ref 0–1)
BILIRUB SERPL-MCNC: 0.52 MG/DL (ref 0.2–1)
BUN SERPL-MCNC: 12 MG/DL (ref 5–25)
CALCIUM SERPL-MCNC: 9.4 MG/DL (ref 8.3–10.1)
CHLORIDE SERPL-SCNC: 105 MMOL/L (ref 96–108)
CHOLEST SERPL-MCNC: 122 MG/DL
CO2 SERPL-SCNC: 29 MMOL/L (ref 21–32)
CREAT SERPL-MCNC: 1.09 MG/DL (ref 0.6–1.3)
EOSINOPHIL # BLD AUTO: 0.35 THOUSAND/ΜL (ref 0–0.61)
EOSINOPHIL NFR BLD AUTO: 6 % (ref 0–6)
ERYTHROCYTE [DISTWIDTH] IN BLOOD BY AUTOMATED COUNT: 11.9 % (ref 11.6–15.1)
EST. AVERAGE GLUCOSE BLD GHB EST-MCNC: 126 MG/DL
GFR SERPL CREATININE-BSD FRML MDRD: 51 ML/MIN/1.73SQ M
GLUCOSE P FAST SERPL-MCNC: 131 MG/DL (ref 65–99)
HBA1C MFR BLD: 6 %
HCT VFR BLD AUTO: 34.3 % (ref 34.8–46.1)
HDLC SERPL-MCNC: 62 MG/DL
HGB BLD-MCNC: 11.2 G/DL (ref 11.5–15.4)
IMM GRANULOCYTES # BLD AUTO: 0.02 THOUSAND/UL (ref 0–0.2)
IMM GRANULOCYTES NFR BLD AUTO: 0 % (ref 0–2)
LDLC SERPL CALC-MCNC: 50 MG/DL (ref 0–100)
LYMPHOCYTES # BLD AUTO: 1.84 THOUSANDS/ΜL (ref 0.6–4.47)
LYMPHOCYTES NFR BLD AUTO: 29 % (ref 14–44)
MCH RBC QN AUTO: 30.9 PG (ref 26.8–34.3)
MCHC RBC AUTO-ENTMCNC: 32.7 G/DL (ref 31.4–37.4)
MCV RBC AUTO: 95 FL (ref 82–98)
MONOCYTES # BLD AUTO: 0.51 THOUSAND/ΜL (ref 0.17–1.22)
MONOCYTES NFR BLD AUTO: 8 % (ref 4–12)
NEUTROPHILS # BLD AUTO: 3.57 THOUSANDS/ΜL (ref 1.85–7.62)
NEUTS SEG NFR BLD AUTO: 56 % (ref 43–75)
NONHDLC SERPL-MCNC: 60 MG/DL
NRBC BLD AUTO-RTO: 0 /100 WBCS
PLATELET # BLD AUTO: 275 THOUSANDS/UL (ref 149–390)
PMV BLD AUTO: 9.5 FL (ref 8.9–12.7)
POTASSIUM SERPL-SCNC: 4.6 MMOL/L (ref 3.5–5.3)
PROT SERPL-MCNC: 6.9 G/DL (ref 6.4–8.4)
RBC # BLD AUTO: 3.63 MILLION/UL (ref 3.81–5.12)
SODIUM SERPL-SCNC: 137 MMOL/L (ref 135–147)
TRIGL SERPL-MCNC: 50 MG/DL
TSH SERPL DL<=0.05 MIU/L-ACNC: 2.46 UIU/ML (ref 0.45–4.5)
WBC # BLD AUTO: 6.32 THOUSAND/UL (ref 4.31–10.16)

## 2022-09-08 PROCEDURE — 84443 ASSAY THYROID STIM HORMONE: CPT

## 2022-09-08 PROCEDURE — 80053 COMPREHEN METABOLIC PANEL: CPT

## 2022-09-08 PROCEDURE — 36415 COLL VENOUS BLD VENIPUNCTURE: CPT

## 2022-09-08 PROCEDURE — 80061 LIPID PANEL: CPT

## 2022-09-08 PROCEDURE — 85025 COMPLETE CBC W/AUTO DIFF WBC: CPT

## 2022-09-08 PROCEDURE — 83036 HEMOGLOBIN GLYCOSYLATED A1C: CPT

## 2022-09-14 ENCOUNTER — OFFICE VISIT (OUTPATIENT)
Dept: ENDOCRINOLOGY | Facility: HOSPITAL | Age: 67
End: 2022-09-14
Payer: MEDICARE

## 2022-09-14 VITALS
WEIGHT: 247.2 LBS | HEIGHT: 67 IN | SYSTOLIC BLOOD PRESSURE: 128 MMHG | BODY MASS INDEX: 38.8 KG/M2 | DIASTOLIC BLOOD PRESSURE: 80 MMHG | HEART RATE: 84 BPM

## 2022-09-14 DIAGNOSIS — E11.40 TYPE 2 DIABETES MELLITUS WITH DIABETIC NEUROPATHY, WITH LONG-TERM CURRENT USE OF INSULIN (HCC): Primary | ICD-10-CM

## 2022-09-14 DIAGNOSIS — I10 ESSENTIAL HYPERTENSION: ICD-10-CM

## 2022-09-14 DIAGNOSIS — E78.2 MIXED HYPERLIPIDEMIA: ICD-10-CM

## 2022-09-14 DIAGNOSIS — Z79.4 TYPE 2 DIABETES MELLITUS WITH DIABETIC NEUROPATHY, WITH LONG-TERM CURRENT USE OF INSULIN (HCC): Primary | ICD-10-CM

## 2022-09-14 DIAGNOSIS — R80.9 MICROALBUMINURIA DUE TO TYPE 2 DIABETES MELLITUS (HCC): ICD-10-CM

## 2022-09-14 DIAGNOSIS — E11.29 MICROALBUMINURIA DUE TO TYPE 2 DIABETES MELLITUS (HCC): ICD-10-CM

## 2022-09-14 PROCEDURE — 95251 CONT GLUC MNTR ANALYSIS I&R: CPT | Performed by: NURSE PRACTITIONER

## 2022-09-14 PROCEDURE — 99214 OFFICE O/P EST MOD 30 MIN: CPT | Performed by: NURSE PRACTITIONER

## 2022-09-14 NOTE — PATIENT INSTRUCTIONS
Be mindful of diet  Stay active and stay hydrated  For now, continue current regimen with Metformin  Take your Humalog BEFORE MEALS!!!!!!!!!!!!    Please stay with 4 units before meals  Decrease Levemir to 10 units  Please check your blood sugars 3-4 times daily and forward a record to the office in 1-2 weeks for review and further adjustment, as needed  Continue lasix and lisinopril for treatment of your blood pressure  Continue Omega 3 fatty acid fish oil daily  Obtain lab work as prescribed

## 2022-09-14 NOTE — PROGRESS NOTES
Angeline Martinez 69 y o  female MRN: [id number]    Encounter: [id number]      Assessment/Plan     Assessment: This is a 69y o -year-old female with type 2 diabetes with neuropathy, hypertension and hyperlipidemia  Plan:  1   Type 2 diabetes insulin requiring:  Her most recent hemoglobin A1c is 6 0  She is experiencing some hypoglycemia overnight and throughout the day  I have asked her to decrease her Levemir to 10 units and to decrease her Humalog at meals to a consistent 4 units  Also reminded her to take her Humalog approximately 10 minutes before her meals as she has been taking it after her meals at times which is most likely contributing to her hypoglycemia  I have asked her to send a record of her blood sugars to the office in 2 weeks for review so further changes can be made to help her blood sugars throughout the day   Check hemoglobin A1c prior to next visit      2   Hypertension/microalbuminuria:  She is normotensiove in the office today  Managed by Cardiology  Continue lisinopril and Lasix   Check comprehensive metabolic panel prior to next visit      3   Hyperlipidemia: Continue Omega 3 fatty acid fish oil  Check fasting lipid panel prior to next visit  CC:  Type 2 Diabetes follow-up    History of Present Illness     HPI:  72 y  o  year old female with type 2 diabetes for approximately 18 years   She is on oral agents and insulin at home and takes Metformin 1000 mg twice daily, Levemir insulin 15 units at bedtime, and Humalog insulin 4-6 units at breakfast and lunch, 2-3 units in the afternoon, and 5-7 units at supper  Her most recent hemoglobin A1c from [facility] 10, 2022 is 5 6  She denies any polydipsia, and blurry vision  Susu Israel has no polyphagia  Susu Israel has 2-3 times per night nocturia and polyuria  She notes hypoglycemia after taking Humalog after meals and sometimes over night  She has no chest pain or shortness of breath   She denies nephropathy, retinopathy, heart attack, stroke and claudication but does admit to neuropathy  Her  passed away in 2020  Downloaded her Freestyle Mae from  through 2022 reveals an average glucose of 126  She is in target range 66% of the time with 17% low        Her most recent diabetic eye exam was 2020 and no retinopathy  She complains of some numbness and tingling to her feet at times   Her most recent diabetic foot exam was performed on 2021 at endocrine office visit      Her hypertension is treated with Lasix 40 mg daily and lisinopril 5 mg daily       For her hyperlipidemia, she is taking Omega 3 fatty acid fish oil 1000 mg daily        Review of Systems   Constitutional: Negative  Negative for chills, fatigue and fever  HENT: Negative  Negative for trouble swallowing and voice change  Eyes: Negative  Negative for visual disturbance  Respiratory: Negative  Negative for chest tightness and shortness of breath  Cardiovascular: Positive for leg swelling (Lymphedema)  Negative for chest pain  Gastrointestinal: Negative  Negative for abdominal pain, constipation, diarrhea and vomiting  Endocrine: Positive for polyuria ( twice per night nocturia)  Negative for cold intolerance, heat intolerance, polydipsia and polyphagia  Genitourinary: Negative  Musculoskeletal: Positive for arthralgias  Skin: Negative  Allergic/Immunologic: Negative  Neurological: Negative  Negative for dizziness, syncope, light-headedness and headaches  Hematological: Negative  Psychiatric/Behavioral: Negative  All other systems reviewed and are negative        Historical Information   Past Medical History:   Diagnosis Date    Asthma     seasonal    Diabetes mellitus (HCC)     Osteoarthritis     Tremor, hereditary, benign      Past Surgical History:   Procedure Laterality Date     SECTION      3    REPLACEMENT TOTAL KNEE Left 2021    TOE SURGERY Bilateral     5 th toe bone removed    TOTAL HIP ARTHROPLASTY Right     TUBAL LIGATION Bilateral      Social History   Social History     Substance and Sexual Activity   Alcohol Use Yes    Alcohol/week: 1 0 standard drink    Types: 1 Standard drinks or equivalent per week    Comment: socially     Social History     Substance and Sexual Activity   Drug Use No     Social History     Tobacco Use   Smoking Status Former Smoker    Packs/day: 1 00    Years: 15 00    Pack years: 15     Types: Cigarettes    Start date: 5/10/1992   Praveen Plasencia Quit date: 2007    Years since quitting: 15 2   Smokeless Tobacco Never Used     Family History:   Family History   Problem Relation Age of Onset    Cirrhosis Mother     Heart disease Mother     Esophageal varices Mother     Brain cancer Father     Prostate cancer Father     Stroke Father     Heart attack Father     Hypertension Father            Praveen Plasencia Skin cancer Brother     COPD Brother     Diabetes type II Maternal Aunt     Diabetes type II Maternal Grandfather             Parkinsonism Maternal Grandfather     Coronary artery disease Brother     Prostate cancer Brother     Post-traumatic stress disorder Son     No Known Problems Son     No Known Problems Daughter     Parkinsonism Maternal Aunt     Diabetes type II Maternal Aunt             Diabetes type I Cousin        Meds/Allergies   Current Outpatient Medications   Medication Sig Dispense Refill    albuterol (PROVENTIL HFA,VENTOLIN HFA) 90 mcg/act inhaler Inhale      aspirin 81 MG tablet Take 81 mg by mouth daily      BD ULTRA-FINE PEN NEEDLES 29G X 12 7MM MISC USE 4 TIMES DAILY 360 each 3    Calcium Carbonate (CALCIUM 600 PO) Take 2 tablets by mouth daily With vitamin d      Continuous Blood Gluc  (FreeStyle Mae 14 Day [place]) BARRIE Use 1 Device 4 (four) times a day (before meals and at bedtime) 1 each 0    Continuous Blood Gluc Sensor (FreeStyle Mae 14 Day Sensor) MISC Use 1 application every 14 (fourteen) days 2 each 6    docusate sodium (COLACE) 100 mg capsule Take 100 mg by mouth daily       ferrous sulfate 325 (65 Fe) mg tablet Take 325 mg by mouth 2 (two) times a day      furosemide (LASIX) 40 mg tablet TAKE 1 TABLET BY MOUTH  DAILY AS NEEDED 90 tablet 3    insulin lispro (HumaLOG KwikPen) 100 units/mL injection pen INJECT SUBCUTANEOUSLY 2 TO  7 UNITS 4 TIMES DAILY AS  DIRECTED 30 mL 4    Levemir FlexTouch 100 units/mL injection pen INJECT 18 TO 20 UNITS  SUBCUTANEOUSLY DAILY AT  BEDTIME 30 mL 5    lisinopril (ZESTRIL) 5 mg tablet Take 5 mg by mouth daily      metFORMIN (GLUCOPHAGE) 1000 MG tablet TAKE 1 TABLET BY MOUTH  TWICE DAILY WITH MEALS 180 tablet 3    Multiple Vitamins-Minerals (WOMENS 50+ MULTI VITAMIN/MIN PO) Take by mouth      Omega-3 Fatty Acids (FISH OIL) 1,000 mg Take 1,000 mg by mouth daily      rosuvastatin (CRESTOR) 10 MG tablet Take 10 mg by mouth daily      vitamin E, tocopherol, 400 units capsule Take 400 Units by mouth daily       No current facility-administered medications for this visit  Allergies   Allergen Reactions    Actos [Pioglitazone] Edema     Severe lower extremity    Gentamicin     Other     Oxycodone GI Intolerance    Sulfa Antibiotics        Objective   Vitals: Blood pressure 128/80, pulse 84, height 5' 7 32" (1 71 m), weight 112 kg (247 lb 3 2 oz)  Physical Exam  Vitals reviewed  Constitutional:       Appearance: She is well-developed  She is obese  HENT:      Head: Normocephalic and atraumatic  Eyes:      Conjunctiva/sclera: Conjunctivae normal       Pupils: Pupils are equal, round, and reactive to light  Comments: Wears glasses   Cardiovascular:      Rate and Rhythm: Normal rate and regular rhythm  Heart sounds: Normal heart sounds  Pulmonary:      Effort: Pulmonary effort is normal       Breath sounds: Normal breath sounds  Abdominal:      General: Bowel sounds are normal       Palpations: Abdomen is soft  Musculoskeletal:         General: Normal range of motion  Cervical back: Normal range of motion and neck supple  Right lower leg: Edema present  Left lower leg: Edema present  Skin:     General: Skin is warm and dry  Neurological:      Mental Status: She is alert and oriented to person, place, and time  Psychiatric:         Behavior: Behavior normal          Thought Content:  Thought content normal          Judgment: Judgment normal        Lab Results:   Lab Results   Component Value Date/Time    Hemoglobin A1C 6 0 (H) 09/08/2022 09:09 AM    Hemoglobin A1C 5 6 06/10/2022 09:11 AM    Hemoglobin A1C 6 2 (H) 02/23/2022 09:12 AM    WBC 6 32 09/08/2022 09:09 AM    WBC 6 10 06/10/2022 09:11 AM    WBC 5 85 02/23/2022 09:12 AM    Hemoglobin 11 2 (L) 09/08/2022 09:09 AM    Hemoglobin 11 3 (L) 06/10/2022 09:11 AM    Hemoglobin 10 8 (L) 02/23/2022 09:12 AM    Hematocrit 34 3 (L) 09/08/2022 09:09 AM    Hematocrit 34 4 (L) 06/10/2022 09:11 AM    Hematocrit 34 1 (L) 02/23/2022 09:12 AM    MCV 95 09/08/2022 09:09 AM    MCV 95 06/10/2022 09:11 AM    MCV 95 02/23/2022 09:12 AM    Platelets 233 03/90/3524 09:09 AM    Platelets 178 92/21/9001 09:11 AM    Platelets 432 33/85/4141 09:12 AM    BUN 12 09/08/2022 09:09 AM    BUN 14 06/10/2022 09:11 AM    BUN 19 02/23/2022 09:12 AM    Potassium 4 6 09/08/2022 09:09 AM    Potassium 4 5 06/10/2022 09:11 AM    Potassium 4 6 02/23/2022 09:12 AM    Chloride 105 09/08/2022 09:09 AM    Chloride 106 06/10/2022 09:11 AM    Chloride 104 02/23/2022 09:12 AM    CO2 29 09/08/2022 09:09 AM    CO2 31 06/10/2022 09:11 AM    CO2 27 02/23/2022 09:12 AM    Creatinine 1 09 09/08/2022 09:09 AM    Creatinine 1 08 06/10/2022 09:11 AM    Creatinine 1 01 02/23/2022 09:12 AM    AST 13 09/08/2022 09:09 AM    AST 18 06/10/2022 09:11 AM    AST 13 02/23/2022 09:12 AM    ALT 20 09/08/2022 09:09 AM    ALT 18 06/10/2022 09:11 AM    ALT 19 02/23/2022 09:12 AM    Albumin 3 5 09/08/2022 09:09 AM Albumin 3 4 (L) 06/10/2022 09:11 AM    Albumin 3 5 02/23/2022 09:12 AM    HDL, Direct 62 09/08/2022 09:09 AM    HDL, Direct 77 02/23/2022 09:12 AM    HDL, Direct 55 11/17/2021 08:32 AM    Triglycerides 50 09/08/2022 09:09 AM    Triglycerides 42 02/23/2022 09:12 AM    Triglycerides 87 11/17/2021 08:32 AM       Portions of the record may have been created with voice recognition software  Occasional wrong word or "sound a like" substitutions may have occurred due to the inherent limitations of voice recognition software  Read the chart carefully and recognize, using context, where substitutions have occurred

## 2022-12-08 DIAGNOSIS — Z79.4 TYPE 2 DIABETES MELLITUS WITH DIABETIC NEUROPATHY, WITH LONG-TERM CURRENT USE OF INSULIN (HCC): ICD-10-CM

## 2022-12-08 DIAGNOSIS — E11.40 TYPE 2 DIABETES MELLITUS WITH DIABETIC NEUROPATHY, WITH LONG-TERM CURRENT USE OF INSULIN (HCC): ICD-10-CM

## 2022-12-08 LAB
LEFT EYE DIABETIC RETINOPATHY: NORMAL
RIGHT EYE DIABETIC RETINOPATHY: NORMAL

## 2022-12-08 RX ORDER — INSULIN DETEMIR 100 [IU]/ML
INJECTION, SOLUTION SUBCUTANEOUS
Qty: 30 ML | Refills: 4 | Status: SHIPPED | OUTPATIENT
Start: 2022-12-08

## 2022-12-09 ENCOUNTER — APPOINTMENT (OUTPATIENT)
Dept: LAB | Facility: CLINIC | Age: 67
End: 2022-12-09

## 2022-12-09 DIAGNOSIS — E11.40 TYPE 2 DIABETES MELLITUS WITH DIABETIC NEUROPATHY, WITH LONG-TERM CURRENT USE OF INSULIN (HCC): ICD-10-CM

## 2022-12-09 DIAGNOSIS — I10 ESSENTIAL HYPERTENSION: ICD-10-CM

## 2022-12-09 DIAGNOSIS — Z79.4 TYPE 2 DIABETES MELLITUS WITH DIABETIC NEUROPATHY, WITH LONG-TERM CURRENT USE OF INSULIN (HCC): ICD-10-CM

## 2022-12-09 DIAGNOSIS — E11.29 MICROALBUMINURIA DUE TO TYPE 2 DIABETES MELLITUS (HCC): ICD-10-CM

## 2022-12-09 DIAGNOSIS — R80.9 MICROALBUMINURIA DUE TO TYPE 2 DIABETES MELLITUS (HCC): ICD-10-CM

## 2022-12-09 DIAGNOSIS — E78.2 MIXED HYPERLIPIDEMIA: ICD-10-CM

## 2022-12-09 LAB
ALBUMIN SERPL BCP-MCNC: 3.5 G/DL (ref 3.5–5)
ALP SERPL-CCNC: 63 U/L (ref 46–116)
ALT SERPL W P-5'-P-CCNC: 21 U/L (ref 12–78)
ANION GAP SERPL CALCULATED.3IONS-SCNC: 6 MMOL/L (ref 4–13)
AST SERPL W P-5'-P-CCNC: 17 U/L (ref 5–45)
BASOPHILS # BLD AUTO: 0.04 THOUSANDS/ÂΜL (ref 0–0.1)
BASOPHILS NFR BLD AUTO: 1 % (ref 0–1)
BILIRUB SERPL-MCNC: 0.33 MG/DL (ref 0.2–1)
BUN SERPL-MCNC: 14 MG/DL (ref 5–25)
CALCIUM SERPL-MCNC: 9.7 MG/DL (ref 8.3–10.1)
CHLORIDE SERPL-SCNC: 103 MMOL/L (ref 96–108)
CO2 SERPL-SCNC: 28 MMOL/L (ref 21–32)
CREAT SERPL-MCNC: 1.16 MG/DL (ref 0.6–1.3)
EOSINOPHIL # BLD AUTO: 0.23 THOUSAND/ÂΜL (ref 0–0.61)
EOSINOPHIL NFR BLD AUTO: 3 % (ref 0–6)
ERYTHROCYTE [DISTWIDTH] IN BLOOD BY AUTOMATED COUNT: 11.9 % (ref 11.6–15.1)
GFR SERPL CREATININE-BSD FRML MDRD: 48 ML/MIN/1.73SQ M
GLUCOSE SERPL-MCNC: 149 MG/DL (ref 65–140)
HCT VFR BLD AUTO: 33.6 % (ref 34.8–46.1)
HGB BLD-MCNC: 10.5 G/DL (ref 11.5–15.4)
IMM GRANULOCYTES # BLD AUTO: 0.03 THOUSAND/UL (ref 0–0.2)
IMM GRANULOCYTES NFR BLD AUTO: 0 % (ref 0–2)
LYMPHOCYTES # BLD AUTO: 2.09 THOUSANDS/ÂΜL (ref 0.6–4.47)
LYMPHOCYTES NFR BLD AUTO: 25 % (ref 14–44)
MCH RBC QN AUTO: 30.5 PG (ref 26.8–34.3)
MCHC RBC AUTO-ENTMCNC: 31.3 G/DL (ref 31.4–37.4)
MCV RBC AUTO: 98 FL (ref 82–98)
MONOCYTES # BLD AUTO: 0.62 THOUSAND/ÂΜL (ref 0.17–1.22)
MONOCYTES NFR BLD AUTO: 7 % (ref 4–12)
NEUTROPHILS # BLD AUTO: 5.33 THOUSANDS/ÂΜL (ref 1.85–7.62)
NEUTS SEG NFR BLD AUTO: 64 % (ref 43–75)
NRBC BLD AUTO-RTO: 0 /100 WBCS
PLATELET # BLD AUTO: 315 THOUSANDS/UL (ref 149–390)
PMV BLD AUTO: 10.1 FL (ref 8.9–12.7)
POTASSIUM SERPL-SCNC: 4.7 MMOL/L (ref 3.5–5.3)
PROT SERPL-MCNC: 7.3 G/DL (ref 6.4–8.4)
RBC # BLD AUTO: 3.44 MILLION/UL (ref 3.81–5.12)
SODIUM SERPL-SCNC: 137 MMOL/L (ref 135–147)
WBC # BLD AUTO: 8.34 THOUSAND/UL (ref 4.31–10.16)

## 2022-12-10 LAB
CREAT UR-MCNC: 46.2 MG/DL
EST. AVERAGE GLUCOSE BLD GHB EST-MCNC: 134 MG/DL
HBA1C MFR BLD: 6.3 %
MICROALBUMIN UR-MCNC: 5.2 MG/L (ref 0–20)
MICROALBUMIN/CREAT 24H UR: 11 MG/G CREATININE (ref 0–30)

## 2022-12-28 ENCOUNTER — OFFICE VISIT (OUTPATIENT)
Dept: ENDOCRINOLOGY | Facility: HOSPITAL | Age: 67
End: 2022-12-28

## 2022-12-28 VITALS
SYSTOLIC BLOOD PRESSURE: 134 MMHG | HEIGHT: 67 IN | BODY MASS INDEX: 36.07 KG/M2 | HEART RATE: 88 BPM | WEIGHT: 229.8 LBS | DIASTOLIC BLOOD PRESSURE: 90 MMHG

## 2022-12-28 DIAGNOSIS — I10 ESSENTIAL HYPERTENSION: ICD-10-CM

## 2022-12-28 DIAGNOSIS — R80.9 MICROALBUMINURIA DUE TO TYPE 2 DIABETES MELLITUS (HCC): ICD-10-CM

## 2022-12-28 DIAGNOSIS — E78.2 MIXED HYPERLIPIDEMIA: ICD-10-CM

## 2022-12-28 DIAGNOSIS — I89.0 LYMPHEDEMA: ICD-10-CM

## 2022-12-28 DIAGNOSIS — Z79.4 TYPE 2 DIABETES MELLITUS WITH DIABETIC NEUROPATHY, WITH LONG-TERM CURRENT USE OF INSULIN (HCC): Primary | ICD-10-CM

## 2022-12-28 DIAGNOSIS — E11.29 MICROALBUMINURIA DUE TO TYPE 2 DIABETES MELLITUS (HCC): ICD-10-CM

## 2022-12-28 DIAGNOSIS — E11.40 TYPE 2 DIABETES MELLITUS WITH DIABETIC NEUROPATHY, WITH LONG-TERM CURRENT USE OF INSULIN (HCC): Primary | ICD-10-CM

## 2022-12-28 RX ORDER — CELECOXIB 100 MG/1
100 CAPSULE ORAL 2 TIMES DAILY
COMMUNITY
Start: 2022-12-22

## 2022-12-28 NOTE — PROGRESS NOTES
Brittny Moura 69 y o  female MRN: [id number]    Encounter: [id number]      Assessment/Plan     Assessment: This is a 69y o -year-old female with type 2 diabetes with neuropathy, hypertension and hyperlipidemia  Plan:  1   Type 2 diabetes insulin requiring:  Her most recent hemoglobin A1c is 6  3   Reviewed her freestyle jerry at the time of the visit  Nila Kendrick is experiencing some glycemia following breakfast   Some lower blood sugars in afternoon and evening  Have asked her to increase her Humalog to 8 units with breakfast, 5 units with dinner  Continue Lantus at current dose and metformin at current dose  I have asked her to send a record of her blood sugars to the office in 2 weeks for review so further changes can be made to help her blood sugars throughout the day   Check hemoglobin A1c prior to next visit      2   Hypertension/microalbuminuria: Microalbuminuria is normal   Managed by Cardiology  Rodrick Membreno lisinopril and Lasix   Check comprehensive metabolic panel prior to next visit      3   Hyperlipidemia: Continue Omega 3 fatty acid fish oil  Check fasting lipid panel prior to next visit  CC: Type 2 Diabetes follow-up    History of Present Illness     HPI:  65 y o  year old female with type 2 diabetes for approximately 18 years   She is on oral agents and insulin at home and takes Metformin 1000 mg twice daily, Levemir insulin 10 units at bedtime, and Humalog insulin 6 unit at meals  Her most recent hemoglobin A1c from December 9, 2022 is 6 3  She denies any polydipsia, and blurry vision   She has no polyphagia   She has 2-3 times per night nocturia and polyuria  She notes hypoglycemia after taking Humalog after sometimes over night  She has no chest pain or shortness of breath   She denies nephropathy, retinopathy, heart attack, stroke and claudication but does admit to neuropathy  Her  passed away in June 2020      Downloaded her [place] from December 15 through December 28, 2022 reveals an average glucose of 148  She is in target range 61% of the time, with 17% hyperglycemia with 12% low        Her most recent diabetic eye exam was 2022 and no retinopathy  She complains of some numbness and tingling to her feet at times   Her most recent diabetic foot exam was performed on 2021 at endocrine office visit      Her hypertension is treated with Lasix 40 mg daily and lisinopril 5 mg daily       For her hyperlipidemia, she is taking Omega 3 fatty acid fish oil 1000 mg daily        Review of Systems   Constitutional: Negative  Negative for chills, fatigue and fever  HENT: Negative  Negative for trouble swallowing and voice change  Eyes: Negative  Negative for photophobia, pain, discharge, redness, itching and visual disturbance  Respiratory: Negative  Negative for chest tightness and shortness of breath  Cardiovascular: Positive for leg swelling  Chest pain: lynmphedema  Gastrointestinal: Negative  Negative for abdominal pain, constipation, diarrhea and vomiting  Endocrine: Positive for polyuria (up to twice per night nocturia)  Negative for cold intolerance, heat intolerance, polydipsia and polyphagia  Genitourinary: Negative  Musculoskeletal: Positive for arthralgias and back pain  Skin: Negative  Allergic/Immunologic: Negative  Neurological: Negative  Negative for dizziness, syncope, light-headedness and headaches  Hematological: Negative  Psychiatric/Behavioral: Negative  All other systems reviewed and are negative        Historical Information   Past Medical History:   Diagnosis Date   • Asthma     seasonal   • Diabetes mellitus ([facility] [address] )    • Osteoarthritis    • Tremor, hereditary, benign      Past Surgical History:   Procedure Laterality Date   •  SECTION      3   • REPLACEMENT TOTAL KNEE Left 2021   • TOE SURGERY Bilateral     5 th toe bone removed   • TOTAL HIP ARTHROPLASTY Right    • TUBAL LIGATION Bilateral Social History   Social History     Substance and Sexual Activity   Alcohol Use Yes   • Alcohol/week: 1 0 standard drink   • Types: 1 Standard drinks or equivalent per week    Comment: socially     Social History     Substance and Sexual Activity   Drug Use No     Social History     Tobacco Use   Smoking Status Former   • Packs/day: 1 00   • Years: 15 00   • Pack years: 15 00   • Types: Cigarettes   • Start date: 5/10/1992   • Quit date: 2007   • Years since quitting: 15 5   Smokeless Tobacco Never     Family History:   Family History   Problem Relation Age of Onset   • Cirrhosis Mother    • Heart disease Mother    • Esophageal varices Mother    • Brain cancer Father    • Prostate cancer Father    • Stroke Father    • Heart attack Father    • Hypertension Father            • Skin cancer Brother    • COPD Brother    • Diabetes type II Maternal Aunt    • Diabetes type II Maternal Grandfather            • Parkinsonism Maternal Grandfather    • Coronary artery disease Brother    • Prostate cancer Brother    • Post-traumatic stress disorder Son    • No Known Problems Son    • No Known Problems Daughter    • Parkinsonism Maternal Aunt    • Diabetes type II Maternal Aunt            • Diabetes type I Cousin        Meds/Allergies   Current Outpatient Medications   Medication Sig Dispense Refill   • albuterol (PROVENTIL HFA,VENTOLIN HFA) 90 mcg/act inhaler Inhale     • aspirin 81 MG tablet Take 81 mg by mouth daily     • BD ULTRA-FINE PEN NEEDLES 29G X 12 7MM MISC USE 4 TIMES DAILY 360 each 3   • Calcium Carbonate (CALCIUM 600 PO) Take 2 tablets by mouth daily With vitamin d     • Continuous Blood Gluc  (FreeStyle Mae 14 Day [place]) BARRIE Use 1 Device 4 (four) times a day (before meals and at bedtime) 1 each 0   • Continuous Blood Gluc Sensor (FreeStyle Mae 14 Day Sensor) MISC Use 1 application every 14 (fourteen) days 2 each 6   • docusate sodium (COLACE) 100 mg capsule Take 100 mg by mouth daily      • ferrous sulfate 325 (65 Fe) mg tablet Take 325 mg by mouth 2 (two) times a day     • furosemide (LASIX) 40 mg tablet TAKE 1 TABLET BY MOUTH  DAILY AS NEEDED 90 tablet 3   • insulin lispro (HumaLOG KwikPen) 100 units/mL injection pen INJECT SUBCUTANEOUSLY 2 TO  7 UNITS 4 TIMES DAILY AS  DIRECTED 30 mL 4   • Levemir FlexTouch 100 units/mL injection pen INJECT 18 TO 20 UNITS  SUBCUTANEOUSLY DAILY AT  BEDTIME 30 mL 4   • lisinopril (ZESTRIL) 5 mg tablet Take 5 mg by mouth daily     • metFORMIN (GLUCOPHAGE) 1000 MG tablet TAKE 1 TABLET BY MOUTH  TWICE DAILY WITH MEALS 180 tablet 3   • Multiple Vitamins-Minerals (WOMENS 50+ MULTI VITAMIN/MIN PO) Take by mouth     • Omega-3 Fatty Acids (FISH OIL) 1,000 mg Take 1,000 mg by mouth daily     • rosuvastatin (CRESTOR) 10 MG tablet Take 10 mg by mouth daily     • vitamin E, tocopherol, 400 units capsule Take 400 Units by mouth daily       No current facility-administered medications for this visit  Allergies   Allergen Reactions   • Actos [Pioglitazone] Edema     Severe lower extremity   • Gentamicin    • Other    • Oxycodone GI Intolerance   • Sulfa Antibiotics        Objective   Vitals: There were no vitals taken for this visit  Physical Exam  Vitals reviewed  Constitutional:       Appearance: She is well-developed  She is obese  HENT:      Head: Normocephalic and atraumatic  Eyes:      Conjunctiva/sclera: Conjunctivae normal       Pupils: Pupils are equal, round, and reactive to light  Comments: Wears glasses   Cardiovascular:      Rate and Rhythm: Normal rate and regular rhythm  Pulses: no weak pulses          Dorsalis pedis pulses are 1+ on the right side and 1+ on the left side  Posterior tibial pulses are 1+ on the right side and 1+ on the left side  Heart sounds: Normal heart sounds  Pulmonary:      Effort: Pulmonary effort is normal       Breath sounds: Normal breath sounds     Abdominal:      General: Bowel sounds are normal       Palpations: Abdomen is soft  Musculoskeletal:         General: Normal range of motion  Cervical back: Normal range of motion and neck supple  Right lower leg: Edema present  Left lower leg: Edema present  Feet:      Right foot:      Skin integrity: No ulcer, skin breakdown, erythema, warmth, callus or dry skin  Left foot:      Skin integrity: No ulcer, skin breakdown, erythema, warmth, callus or dry skin  Skin:     General: Skin is warm and dry  Neurological:      Mental Status: She is alert and oriented to person, place, and time  Psychiatric:         Behavior: Behavior normal          Thought Content: Thought content normal          Judgment: Judgment normal        Patient's shoes and socks removed  Right Foot/Ankle   Right Foot Inspection  Skin Exam: skin normal and skin intact  No dry skin, no warmth, no callus, no erythema, no maceration, no abnormal color, no pre-ulcer, no ulcer and no callus  Toe Exam: ROM and strength within normal limits and swelling  Sensory   Monofilament testing: intact    Vascular  Capillary refills: < 3 seconds  The right DP pulse is 1+  The right PT pulse is 1+  Left Foot/Ankle  Left Foot Inspection  Skin Exam: skin normal and skin intact  No dry skin, no warmth, no erythema, no maceration, normal color, no pre-ulcer, no ulcer and no callus  Toe Exam: ROM and strength within normal limits and swelling  Sensory   Monofilament testing: intact    Vascular  Capillary refills: < 3 seconds  The left DP pulse is 1+  The left PT pulse is 1+       Assign Risk Category  No deformity present  No loss of protective sensation  No weak pulses  Risk: 0    Lab Results:   Lab Results   Component Value Date/Time    Hemoglobin A1C 6 3 (H) 12/09/2022 02:45 PM    Hemoglobin A1C 6 0 (H) 09/08/2022 09:09 AM    Hemoglobin A1C 5 6 06/10/2022 09:11 AM    WBC 8 34 12/09/2022 02:45 PM    WBC 6 32 09/08/2022 09:09 AM    WBC 6 10 06/10/2022 09:11 AM    Hemoglobin 10 5 (L) 12/09/2022 02:45 PM    Hemoglobin 11 2 (L) 09/08/2022 09:09 AM    Hemoglobin 11 3 (L) 06/10/2022 09:11 AM    Hematocrit 33 6 (L) 12/09/2022 02:45 PM    Hematocrit 34 3 (L) 09/08/2022 09:09 AM    Hematocrit 34 4 (L) 06/10/2022 09:11 AM    MCV 98 12/09/2022 02:45 PM    MCV 95 09/08/2022 09:09 AM    MCV 95 06/10/2022 09:11 AM    Platelets 677 30/78/7748 02:45 PM    Platelets 794 23/24/7006 09:09 AM    Platelets 833 56/64/6687 09:11 AM    BUN 14 12/09/2022 02:45 PM    BUN 12 09/08/2022 09:09 AM    BUN 14 06/10/2022 09:11 AM    Potassium 4 7 12/09/2022 02:45 PM    Potassium 4 6 09/08/2022 09:09 AM    Potassium 4 5 06/10/2022 09:11 AM    Chloride 103 12/09/2022 02:45 PM    Chloride 105 09/08/2022 09:09 AM    Chloride 106 06/10/2022 09:11 AM    CO2 28 12/09/2022 02:45 PM    CO2 29 09/08/2022 09:09 AM    CO2 31 06/10/2022 09:11 AM    Creatinine 1 16 12/09/2022 02:45 PM    Creatinine 1 09 09/08/2022 09:09 AM    Creatinine 1 08 06/10/2022 09:11 AM    AST 17 12/09/2022 02:45 PM    AST 13 09/08/2022 09:09 AM    AST 18 06/10/2022 09:11 AM    ALT 21 12/09/2022 02:45 PM    ALT 20 09/08/2022 09:09 AM    ALT 18 06/10/2022 09:11 AM    Albumin 3 5 12/09/2022 02:45 PM    Albumin 3 5 09/08/2022 09:09 AM    Albumin 3 4 (L) 06/10/2022 09:11 AM    HDL, Direct 62 09/08/2022 09:09 AM    HDL, Direct 77 02/23/2022 09:12 AM    Triglycerides 50 09/08/2022 09:09 AM    Triglycerides 42 02/23/2022 09:12 AM       Portions of the record may have been created with voice recognition software  Occasional wrong word or "sound a like" substitutions may have occurred due to the inherent limitations of voice recognition software  Read the chart carefully and recognize, using context, where substitutions have occurred

## 2022-12-28 NOTE — PATIENT INSTRUCTIONS
Be mindful of diet  Stay active and stay hydrated  For now, continue current regimen with Metformin  Take your Humalog BEFORE MEALS!!!!!!!!!!!! Increase Humalog to 8 units at breakfast,    Adjust to 5 units at lunch and dinner with 2 units at mid-afternoon snack  Continue Levemir 10 units  Please check your blood sugars 3-4 times daily and forward a record to the office in 1-2 weeks for review and further adjustment, as needed  Continue lasix and lisinopril for treatment of your blood pressure  Continue Omega 3 fatty acid fish oil daily  Obtain lab work as prescribed

## 2023-01-27 ENCOUNTER — DOCUMENTATION (OUTPATIENT)
Dept: ENDOCRINOLOGY | Facility: HOSPITAL | Age: 68
End: 2023-01-27

## 2023-03-20 ENCOUNTER — LAB (OUTPATIENT)
Dept: LAB | Facility: CLINIC | Age: 68
End: 2023-03-20

## 2023-03-20 DIAGNOSIS — I10 ESSENTIAL HYPERTENSION: ICD-10-CM

## 2023-03-20 DIAGNOSIS — E11.29 MICROALBUMINURIA DUE TO TYPE 2 DIABETES MELLITUS (HCC): ICD-10-CM

## 2023-03-20 DIAGNOSIS — E11.40 TYPE 2 DIABETES MELLITUS WITH DIABETIC NEUROPATHY, WITH LONG-TERM CURRENT USE OF INSULIN (HCC): ICD-10-CM

## 2023-03-20 DIAGNOSIS — Z79.4 TYPE 2 DIABETES MELLITUS WITH DIABETIC NEUROPATHY, WITH LONG-TERM CURRENT USE OF INSULIN (HCC): ICD-10-CM

## 2023-03-20 DIAGNOSIS — R80.9 MICROALBUMINURIA DUE TO TYPE 2 DIABETES MELLITUS (HCC): ICD-10-CM

## 2023-03-20 DIAGNOSIS — I89.0 LYMPHEDEMA: ICD-10-CM

## 2023-03-20 DIAGNOSIS — E78.2 MIXED HYPERLIPIDEMIA: ICD-10-CM

## 2023-03-20 LAB
ALBUMIN SERPL BCP-MCNC: 3.6 G/DL (ref 3.5–5)
ALP SERPL-CCNC: 53 U/L (ref 46–116)
ALT SERPL W P-5'-P-CCNC: 21 U/L (ref 12–78)
ANION GAP SERPL CALCULATED.3IONS-SCNC: -1 MMOL/L (ref 4–13)
AST SERPL W P-5'-P-CCNC: 19 U/L (ref 5–45)
BASOPHILS # BLD AUTO: 0.03 THOUSANDS/ÂΜL (ref 0–0.1)
BASOPHILS NFR BLD AUTO: 1 % (ref 0–1)
BILIRUB SERPL-MCNC: 0.71 MG/DL (ref 0.2–1)
BUN SERPL-MCNC: 16 MG/DL (ref 5–25)
CALCIUM SERPL-MCNC: 9.5 MG/DL (ref 8.3–10.1)
CHLORIDE SERPL-SCNC: 105 MMOL/L (ref 96–108)
CHOLEST SERPL-MCNC: 138 MG/DL
CO2 SERPL-SCNC: 32 MMOL/L (ref 21–32)
CREAT SERPL-MCNC: 1.15 MG/DL (ref 0.6–1.3)
EOSINOPHIL # BLD AUTO: 0.45 THOUSAND/ÂΜL (ref 0–0.61)
EOSINOPHIL NFR BLD AUTO: 8 % (ref 0–6)
ERYTHROCYTE [DISTWIDTH] IN BLOOD BY AUTOMATED COUNT: 11.6 % (ref 11.6–15.1)
GFR SERPL CREATININE-BSD FRML MDRD: 48 ML/MIN/1.73SQ M
GLUCOSE P FAST SERPL-MCNC: 191 MG/DL (ref 65–99)
HCT VFR BLD AUTO: 35.4 % (ref 34.8–46.1)
HDLC SERPL-MCNC: 70 MG/DL
HGB BLD-MCNC: 11.4 G/DL (ref 11.5–15.4)
IMM GRANULOCYTES # BLD AUTO: 0.01 THOUSAND/UL (ref 0–0.2)
IMM GRANULOCYTES NFR BLD AUTO: 0 % (ref 0–2)
LDLC SERPL CALC-MCNC: 55 MG/DL (ref 0–100)
LYMPHOCYTES # BLD AUTO: 2.1 THOUSANDS/ÂΜL (ref 0.6–4.47)
LYMPHOCYTES NFR BLD AUTO: 36 % (ref 14–44)
MCH RBC QN AUTO: 30.2 PG (ref 26.8–34.3)
MCHC RBC AUTO-ENTMCNC: 32.2 G/DL (ref 31.4–37.4)
MCV RBC AUTO: 94 FL (ref 82–98)
MONOCYTES # BLD AUTO: 0.6 THOUSAND/ÂΜL (ref 0.17–1.22)
MONOCYTES NFR BLD AUTO: 10 % (ref 4–12)
NEUTROPHILS # BLD AUTO: 2.73 THOUSANDS/ÂΜL (ref 1.85–7.62)
NEUTS SEG NFR BLD AUTO: 45 % (ref 43–75)
NONHDLC SERPL-MCNC: 68 MG/DL
NRBC BLD AUTO-RTO: 0 /100 WBCS
PLATELET # BLD AUTO: 281 THOUSANDS/UL (ref 149–390)
PMV BLD AUTO: 9.7 FL (ref 8.9–12.7)
POTASSIUM SERPL-SCNC: 4.7 MMOL/L (ref 3.5–5.3)
PROT SERPL-MCNC: 6.8 G/DL (ref 6.4–8.4)
RBC # BLD AUTO: 3.77 MILLION/UL (ref 3.81–5.12)
SODIUM SERPL-SCNC: 136 MMOL/L (ref 135–147)
TRIGL SERPL-MCNC: 67 MG/DL
WBC # BLD AUTO: 5.92 THOUSAND/UL (ref 4.31–10.16)

## 2023-03-21 LAB
EST. AVERAGE GLUCOSE BLD GHB EST-MCNC: 143 MG/DL
HBA1C MFR BLD: 6.6 %

## 2023-03-30 ENCOUNTER — OFFICE VISIT (OUTPATIENT)
Dept: ENDOCRINOLOGY | Facility: HOSPITAL | Age: 68
End: 2023-03-30

## 2023-03-30 VITALS
DIASTOLIC BLOOD PRESSURE: 94 MMHG | WEIGHT: 228.6 LBS | HEART RATE: 58 BPM | HEIGHT: 67 IN | BODY MASS INDEX: 35.88 KG/M2 | SYSTOLIC BLOOD PRESSURE: 140 MMHG

## 2023-03-30 DIAGNOSIS — E11.40 TYPE 2 DIABETES MELLITUS WITH DIABETIC NEUROPATHY, WITH LONG-TERM CURRENT USE OF INSULIN (HCC): Primary | ICD-10-CM

## 2023-03-30 DIAGNOSIS — I10 ESSENTIAL HYPERTENSION: ICD-10-CM

## 2023-03-30 DIAGNOSIS — E11.29 MICROALBUMINURIA DUE TO TYPE 2 DIABETES MELLITUS (HCC): ICD-10-CM

## 2023-03-30 DIAGNOSIS — R80.9 MICROALBUMINURIA DUE TO TYPE 2 DIABETES MELLITUS (HCC): ICD-10-CM

## 2023-03-30 DIAGNOSIS — E78.2 MIXED HYPERLIPIDEMIA: ICD-10-CM

## 2023-03-30 DIAGNOSIS — Z79.4 TYPE 2 DIABETES MELLITUS WITH DIABETIC NEUROPATHY, WITH LONG-TERM CURRENT USE OF INSULIN (HCC): Primary | ICD-10-CM

## 2023-03-30 RX ORDER — APIXABAN 5 MG/1
5 TABLET, FILM COATED ORAL 2 TIMES DAILY
COMMUNITY
Start: 2023-03-06

## 2023-03-30 NOTE — PROGRESS NOTES
Yury Tovar 69 y o  female MRN: [id number]    Encounter: [id number]      Assessment/Plan     Assessment: This is a 69y o -year-old female with type 2 diabetes with neuropathy, hypertension and hyperlipidemia  Plan:  1   Type 2 diabetes insulin requiring:  Her most recent hemoglobin A1c is 6  3   Reviewed her freestyle jerry at the time of the visit  Iraj Thorne is experiencing some glycemia following breakfast   Some lower blood sugars in afternoon and evening  Have asked her to increase her Humalog to 10 units with breakfast, 4 units with dinner  Continue Lantus at current dose and metformin at current dose  I have asked her to send a record of her blood sugars to the office in 2 weeks for review so further changes can be made to help her blood sugars throughout the day   Check hemoglobin A1c prior to next visit      2   Hypertension/microalbuminuria: Microalbuminuria is normal   Managed by Cardiology  Ceci Esters lisinopril and Lasix   Check comprehensive metabolic panel prior to next visit      3   Hyperlipidemia: Stable   Continue Omega 3 fatty acid fish oil         CC: Type 2 Diabetes follow up    History of Present Illness     HPI:  65 y o  year old female with type 2 diabetes for approximately 18 years   She is on oral agents and insulin at home and takes Metformin 1000 mg twice daily, Levemir insulin 10 units at bedtime, and Humalog insulin 6 unit at meals  Her most recent hemoglobin A1c from March 20, 2023 is 6 6  She denies any polydipsia, and blurry vision   She has no polyphagia   She has 2-3 times per night nocturia and polyuria  She notes hypoglycemia after taking Humalog after sometimes over night  She has no chest pain or shortness of breath   She denies nephropathy, retinopathy, heart attack, stroke and claudication but does admit to neuropathy  Her  passed away in June 2020      Downloaded her Freestyle [place]af[place] from March 17 through March 30, 2023 reveals an average glucose of 158   She is in target range 64% of the time, with 30% hyperglycemia with 6% low        Her most recent diabetic eye exam was 2022 and no retinopathy  She complains of some numbness and tingling to her feet at times   Her most recent diabetic foot exam was performed on 2021 at endocrine office visit      Her hypertension is treated with Lasix 40 mg daily and lisinopril 5 mg daily       For her hyperlipidemia, she is taking Omega 3 fatty acid fish oil 1000 mg daily  Review of Systems   Constitutional: Negative  Negative for chills, fatigue and fever  HENT: Negative  Negative for trouble swallowing and voice change  Eyes: Negative  Negative for photophobia, pain, discharge, redness, itching and visual disturbance  Respiratory: Negative  Negative for chest tightness and shortness of breath  Cardiovascular: Positive for leg swelling (lymphedema)  Negative for chest pain  Gastrointestinal: Negative  Negative for abdominal pain, constipation, diarrhea and vomiting  Endocrine: Positive for polyuria (1-2 times per night nocturia)  Negative for cold intolerance, heat intolerance, polydipsia and polyphagia  Genitourinary: Negative  Musculoskeletal: Positive for arthralgias and myalgias  Skin: Negative  Allergic/Immunologic: Negative  Neurological: Negative  Negative for dizziness, syncope, light-headedness and headaches  Hematological: Negative  Psychiatric/Behavioral: Negative  All other systems reviewed and are negative        Historical Information   Past Medical History:   Diagnosis Date   • Asthma     seasonal   • Diabetes mellitus ([facility] [address] )    • Osteoarthritis    • Tremor, hereditary, benign      Past Surgical History:   Procedure Laterality Date   •  SECTION      3   • REPLACEMENT TOTAL KNEE Left 2021   • TOE SURGERY Bilateral     5 th toe bone removed   • TOTAL HIP ARTHROPLASTY Right    • TUBAL LIGATION Bilateral      Social History   Social History Substance and Sexual Activity   Alcohol Use Yes   • Alcohol/week: 1 0 standard drink   • Types: 1 Standard drinks or equivalent per week    Comment: socially     Social History     Substance and Sexual Activity   Drug Use No     Social History     Tobacco Use   Smoking Status Former   • Packs/day: 1 00   • Years: 15 00   • Pack years: 15 00   • Types: Cigarettes   • Start date: 5/10/1992   • Quit date: 2007   • Years since quitting: 15 8   Smokeless Tobacco Never     Family History:   Family History   Problem Relation Age of Onset   • Cirrhosis Mother    • Heart disease Mother    • Esophageal varices Mother    • Brain cancer Father    • Prostate cancer Father    • Stroke Father    • Heart attack Father    • Hypertension Father            • Skin cancer Brother    • COPD Brother    • Diabetes type II Maternal Aunt    • Diabetes type II Maternal Grandfather            • Parkinsonism Maternal Grandfather    • Coronary artery disease Brother    • Prostate cancer Brother    • Post-traumatic stress disorder Son    • No Known Problems Son    • No Known Problems Daughter    • Parkinsonism Maternal Aunt    • Diabetes type II Maternal Aunt            • Diabetes type I Cousin        Meds/Allergies   Current Outpatient Medications   Medication Sig Dispense Refill   • albuterol (PROVENTIL HFA,VENTOLIN HFA) 90 mcg/act inhaler Inhale     • BD ULTRA-FINE PEN NEEDLES 29G X 12 7MM MISC USE 4 TIMES DAILY 360 each 3   • Calcium Carbonate (CALCIUM 600 PO) Take 1 tablet by mouth daily With vitamin d     • Continuous Blood Gluc  (FreeStyle Mae 14 Day [place]) BARRIE Use 1 Device 4 (four) times a day (before meals and at bedtime) 1 each 0   • Continuous Blood Gluc Sensor (FreeStyle Mae 14 Day Sensor) MISC Use 1 application every 14 (fourteen) days 2 each 6   • docusate sodium (COLACE) 100 mg capsule Take 100 mg by mouth daily      • Eliquis 5 MG Take 5 mg by mouth 2 (two) times a day     • ferrous "sulfate 325 (65 Fe) mg tablet Take 325 mg by mouth 2 (two) times a day     • furosemide (LASIX) 40 mg tablet TAKE 1 TABLET BY MOUTH  DAILY AS NEEDED 90 tablet 3   • insulin lispro (HumaLOG KwikPen) 100 units/mL injection pen INJECT SUBCUTANEOUSLY 2 TO  7 UNITS 4 TIMES DAILY AS  DIRECTED 30 mL 4   • Levemir FlexTouch 100 units/mL injection pen INJECT 18 TO 20 UNITS  SUBCUTANEOUSLY DAILY AT  BEDTIME (Patient taking differently: 10 units at night ) 30 mL 4   • lisinopril (ZESTRIL) 10 mg tablet Take 10 mg by mouth daily     • Magnesium 400 MG CAPS Take by mouth in the morning     • metFORMIN (GLUCOPHAGE) 1000 MG tablet TAKE 1 TABLET BY MOUTH  TWICE DAILY WITH MEALS 180 tablet 3   • metoprolol tartrate (LOPRESSOR) 25 mg tablet Take 25 mg by mouth 2 (two) times a day     • Multiple Vitamins-Minerals (WOMENS 50+ MULTI VITAMIN/MIN PO) Take by mouth     • Omega-3 Fatty Acids (FISH OIL) 1,000 mg Take 1,000 mg by mouth daily     • rosuvastatin (CRESTOR) 10 MG tablet Take 10 mg by mouth daily     • vitamin E, tocopherol, 400 units capsule Take 400 Units by mouth daily       No current facility-administered medications for this visit  Allergies   Allergen Reactions   • Actos [Pioglitazone] Edema     Severe lower extremity   • Gentamicin    • Other    • Oxycodone GI Intolerance   • Sulfa Antibiotics        Objective   Vitals: Blood pressure 140/94, pulse 58, height 5' 7 32\" (1 71 m), weight 104 kg (228 lb 9 6 oz)  Physical Exam  Vitals reviewed  Constitutional:       Appearance: She is well-developed  She is obese  HENT:      Head: Normocephalic and atraumatic  Eyes:      Conjunctiva/sclera: Conjunctivae normal       Pupils: Pupils are equal, round, and reactive to light  Comments: Wears glasses   Cardiovascular:      Rate and Rhythm: Normal rate and regular rhythm  Heart sounds: Normal heart sounds  Pulmonary:      Effort: Pulmonary effort is normal       Breath sounds: Normal breath sounds     Abdominal: " General: Bowel sounds are normal       Palpations: Abdomen is soft  Musculoskeletal:         General: Normal range of motion  Cervical back: Normal range of motion and neck supple  Right lower leg: Edema present  Left lower leg: Edema present  Skin:     General: Skin is warm and dry  Neurological:      Mental Status: She is alert and oriented to person, place, and time  Psychiatric:         Behavior: Behavior normal          Thought Content:  Thought content normal          Judgment: Judgment normal        Lab Results:   Lab Results   Component Value Date/Time    Hemoglobin A1C 6 6 (H) 03/20/2023 10:17 AM    Hemoglobin A1C 6 3 (H) 12/09/2022 02:45 PM    Hemoglobin A1C 6 0 (H) 09/08/2022 09:09 AM    WBC 5 92 03/20/2023 10:17 AM    WBC 8 34 12/09/2022 02:45 PM    WBC 6 32 09/08/2022 09:09 AM    Hemoglobin 11 4 (L) 03/20/2023 10:17 AM    Hemoglobin 10 5 (L) 12/09/2022 02:45 PM    Hemoglobin 11 2 (L) 09/08/2022 09:09 AM    Hematocrit 35 4 03/20/2023 10:17 AM    Hematocrit 33 6 (L) 12/09/2022 02:45 PM    Hematocrit 34 3 (L) 09/08/2022 09:09 AM    MCV 94 03/20/2023 10:17 AM    MCV 98 12/09/2022 02:45 PM    MCV 95 09/08/2022 09:09 AM    Platelets 866 50/94/8935 10:17 AM    Platelets 061 89/27/0874 02:45 PM    Platelets 134 31/32/3801 09:09 AM    BUN 16 03/20/2023 10:17 AM    BUN 14 12/09/2022 02:45 PM    BUN 12 09/08/2022 09:09 AM    Potassium 4 7 03/20/2023 10:17 AM    Potassium 4 7 12/09/2022 02:45 PM    Potassium 4 6 09/08/2022 09:09 AM    Chloride 105 03/20/2023 10:17 AM    Chloride 103 12/09/2022 02:45 PM    Chloride 105 09/08/2022 09:09 AM    CO2 32 03/20/2023 10:17 AM    CO2 28 12/09/2022 02:45 PM    CO2 29 09/08/2022 09:09 AM    Creatinine 1 15 03/20/2023 10:17 AM    Creatinine 1 16 12/09/2022 02:45 PM    Creatinine 1 09 09/08/2022 09:09 AM    AST 19 03/20/2023 10:17 AM    AST 17 12/09/2022 02:45 PM    AST 13 09/08/2022 09:09 AM    ALT 21 03/20/2023 10:17 AM    ALT 21 12/09/2022 02:45 PM "   ALT 20 09/08/2022 09:09 AM    Albumin 3 6 03/20/2023 10:17 AM    Albumin 3 5 12/09/2022 02:45 PM    Albumin 3 5 09/08/2022 09:09 AM    HDL, Direct 70 03/20/2023 10:17 AM    HDL, Direct 62 09/08/2022 09:09 AM    Triglycerides 67 03/20/2023 10:17 AM    Triglycerides 50 09/08/2022 09:09 AM     Portions of the record may have been created with voice recognition software  Occasional wrong word or \"sound a like\" substitutions may have occurred due to the inherent limitations of voice recognition software  Read the chart carefully and recognize, using context, where substitutions have occurred    "

## 2023-03-30 NOTE — PATIENT INSTRUCTIONS
Be mindful of diet  Stay active and stay hydrated  For now, continue current regimen with Metformin  Take your Humalog 15 MINUTES BEFORE MEALS !!!!!!!!!!!! Increase Humalog to 10 units at breakfast      Continue Humalog 5 units at lunch and with 2 units at mid-afternoon snack  Decrease Humalog to 4 units at dinner  Continue Levemir 10 units  Please check your blood sugars 3-4 times daily and forward a record to the office in 1-2 weeks for review and further adjustment, as needed  Continue lasix and lisinopril for treatment of your blood pressure  Continue Omega 3 fatty acid fish oil daily  Obtain lab work as prescribed

## 2023-04-22 DIAGNOSIS — Z79.4 TYPE 2 DIABETES MELLITUS WITH DIABETIC NEUROPATHY, WITH LONG-TERM CURRENT USE OF INSULIN (HCC): ICD-10-CM

## 2023-04-22 DIAGNOSIS — E11.40 TYPE 2 DIABETES MELLITUS WITH DIABETIC NEUROPATHY, WITH LONG-TERM CURRENT USE OF INSULIN (HCC): ICD-10-CM

## 2023-04-24 RX ORDER — PEN NEEDLE, DIABETIC 29 G X1/2"
NEEDLE, DISPOSABLE MISCELLANEOUS
Qty: 360 EACH | Refills: 3 | Status: SHIPPED | OUTPATIENT
Start: 2023-04-24

## 2023-06-08 DIAGNOSIS — Z79.4 TYPE 2 DIABETES MELLITUS WITH DIABETIC NEUROPATHY, WITH LONG-TERM CURRENT USE OF INSULIN (HCC): ICD-10-CM

## 2023-06-08 DIAGNOSIS — E11.40 TYPE 2 DIABETES MELLITUS WITH DIABETIC NEUROPATHY, WITH LONG-TERM CURRENT USE OF INSULIN (HCC): ICD-10-CM

## 2023-06-08 RX ORDER — INSULIN LISPRO 100 [IU]/ML
INJECTION, SOLUTION INTRAVENOUS; SUBCUTANEOUS
Qty: 30 ML | Refills: 4 | Status: SHIPPED | OUTPATIENT
Start: 2023-06-08 | End: 2023-06-12

## 2023-06-12 DIAGNOSIS — Z79.4 TYPE 2 DIABETES MELLITUS WITH DIABETIC NEUROPATHY, WITH LONG-TERM CURRENT USE OF INSULIN (HCC): ICD-10-CM

## 2023-06-12 DIAGNOSIS — E11.40 TYPE 2 DIABETES MELLITUS WITH DIABETIC NEUROPATHY, WITH LONG-TERM CURRENT USE OF INSULIN (HCC): ICD-10-CM

## 2023-06-12 RX ORDER — INSULIN LISPRO 100 [IU]/ML
INJECTION, SOLUTION INTRAVENOUS; SUBCUTANEOUS
Qty: 30 ML | Refills: 4 | Status: SHIPPED | OUTPATIENT
Start: 2023-06-12

## 2023-06-23 LAB
DME PARACHUTE DELIVERY DATE ACTUAL: NORMAL
DME PARACHUTE DELIVERY DATE REQUESTED: NORMAL
DME PARACHUTE ITEM DESCRIPTION: NORMAL
DME PARACHUTE ITEM DESCRIPTION: NORMAL
DME PARACHUTE ORDER STATUS: NORMAL
DME PARACHUTE SUPPLIER NAME: NORMAL
DME PARACHUTE SUPPLIER PHONE: NORMAL

## 2023-07-10 ENCOUNTER — LAB (OUTPATIENT)
Dept: LAB | Facility: CLINIC | Age: 68
End: 2023-07-10
Payer: MEDICARE

## 2023-07-10 DIAGNOSIS — R80.9 MICROALBUMINURIA DUE TO TYPE 2 DIABETES MELLITUS (HCC): ICD-10-CM

## 2023-07-10 DIAGNOSIS — I10 ESSENTIAL HYPERTENSION: ICD-10-CM

## 2023-07-10 DIAGNOSIS — E11.40 TYPE 2 DIABETES MELLITUS WITH DIABETIC NEUROPATHY, WITH LONG-TERM CURRENT USE OF INSULIN (HCC): ICD-10-CM

## 2023-07-10 DIAGNOSIS — E78.2 MIXED HYPERLIPIDEMIA: ICD-10-CM

## 2023-07-10 DIAGNOSIS — E11.29 MICROALBUMINURIA DUE TO TYPE 2 DIABETES MELLITUS (HCC): ICD-10-CM

## 2023-07-10 DIAGNOSIS — Z79.4 TYPE 2 DIABETES MELLITUS WITH DIABETIC NEUROPATHY, WITH LONG-TERM CURRENT USE OF INSULIN (HCC): ICD-10-CM

## 2023-07-10 LAB
ALBUMIN SERPL BCP-MCNC: 3.6 G/DL (ref 3.5–5)
ALP SERPL-CCNC: 54 U/L (ref 46–116)
ALT SERPL W P-5'-P-CCNC: 18 U/L (ref 12–78)
ANION GAP SERPL CALCULATED.3IONS-SCNC: 4 MMOL/L
AST SERPL W P-5'-P-CCNC: 19 U/L (ref 5–45)
BASOPHILS # BLD AUTO: 0.03 THOUSANDS/ÂΜL (ref 0–0.1)
BASOPHILS NFR BLD AUTO: 1 % (ref 0–1)
BILIRUB SERPL-MCNC: 0.45 MG/DL (ref 0.2–1)
BUN SERPL-MCNC: 20 MG/DL (ref 5–25)
CALCIUM SERPL-MCNC: 9.7 MG/DL (ref 8.3–10.1)
CHLORIDE SERPL-SCNC: 107 MMOL/L (ref 96–108)
CO2 SERPL-SCNC: 31 MMOL/L (ref 21–32)
CREAT SERPL-MCNC: 1.12 MG/DL (ref 0.6–1.3)
EOSINOPHIL # BLD AUTO: 0.4 THOUSAND/ÂΜL (ref 0–0.61)
EOSINOPHIL NFR BLD AUTO: 7 % (ref 0–6)
ERYTHROCYTE [DISTWIDTH] IN BLOOD BY AUTOMATED COUNT: 12.2 % (ref 11.6–15.1)
GFR SERPL CREATININE-BSD FRML MDRD: 50 ML/MIN/1.73SQ M
GLUCOSE P FAST SERPL-MCNC: 124 MG/DL (ref 65–99)
HCT VFR BLD AUTO: 36.5 % (ref 34.8–46.1)
HGB BLD-MCNC: 11.7 G/DL (ref 11.5–15.4)
IMM GRANULOCYTES # BLD AUTO: 0.01 THOUSAND/UL (ref 0–0.2)
IMM GRANULOCYTES NFR BLD AUTO: 0 % (ref 0–2)
LYMPHOCYTES # BLD AUTO: 2.74 THOUSANDS/ÂΜL (ref 0.6–4.47)
LYMPHOCYTES NFR BLD AUTO: 43 % (ref 14–44)
MCH RBC QN AUTO: 30.5 PG (ref 26.8–34.3)
MCHC RBC AUTO-ENTMCNC: 32.1 G/DL (ref 31.4–37.4)
MCV RBC AUTO: 95 FL (ref 82–98)
MONOCYTES # BLD AUTO: 0.47 THOUSAND/ÂΜL (ref 0.17–1.22)
MONOCYTES NFR BLD AUTO: 8 % (ref 4–12)
NEUTROPHILS # BLD AUTO: 2.51 THOUSANDS/ÂΜL (ref 1.85–7.62)
NEUTS SEG NFR BLD AUTO: 41 % (ref 43–75)
NRBC BLD AUTO-RTO: 0 /100 WBCS
PLATELET # BLD AUTO: 309 THOUSANDS/UL (ref 149–390)
PMV BLD AUTO: 9.7 FL (ref 8.9–12.7)
POTASSIUM SERPL-SCNC: 4.4 MMOL/L (ref 3.5–5.3)
PROT SERPL-MCNC: 6.8 G/DL (ref 6.4–8.4)
RBC # BLD AUTO: 3.84 MILLION/UL (ref 3.81–5.12)
SODIUM SERPL-SCNC: 142 MMOL/L (ref 135–147)
TSH SERPL DL<=0.05 MIU/L-ACNC: 3.39 UIU/ML (ref 0.45–4.5)
WBC # BLD AUTO: 6.16 THOUSAND/UL (ref 4.31–10.16)

## 2023-07-10 PROCEDURE — 85025 COMPLETE CBC W/AUTO DIFF WBC: CPT

## 2023-07-10 PROCEDURE — 84443 ASSAY THYROID STIM HORMONE: CPT

## 2023-07-10 PROCEDURE — 36415 COLL VENOUS BLD VENIPUNCTURE: CPT

## 2023-07-10 PROCEDURE — 83036 HEMOGLOBIN GLYCOSYLATED A1C: CPT

## 2023-07-10 PROCEDURE — 80053 COMPREHEN METABOLIC PANEL: CPT

## 2023-07-12 LAB
EST. AVERAGE GLUCOSE BLD GHB EST-MCNC: 148 MG/DL
HBA1C MFR BLD: 6.8 %

## 2023-07-20 ENCOUNTER — OFFICE VISIT (OUTPATIENT)
Dept: ENDOCRINOLOGY | Facility: HOSPITAL | Age: 68
End: 2023-07-20
Payer: MEDICARE

## 2023-07-20 VITALS
BODY MASS INDEX: 36.29 KG/M2 | WEIGHT: 231.2 LBS | HEART RATE: 73 BPM | HEIGHT: 67 IN | SYSTOLIC BLOOD PRESSURE: 120 MMHG | DIASTOLIC BLOOD PRESSURE: 70 MMHG | OXYGEN SATURATION: 98 %

## 2023-07-20 DIAGNOSIS — E11.29 MICROALBUMINURIA DUE TO TYPE 2 DIABETES MELLITUS (HCC): ICD-10-CM

## 2023-07-20 DIAGNOSIS — E66.01 OBESITY, MORBID (HCC): ICD-10-CM

## 2023-07-20 DIAGNOSIS — I89.0 LYMPHEDEMA: ICD-10-CM

## 2023-07-20 DIAGNOSIS — R80.9 MICROALBUMINURIA DUE TO TYPE 2 DIABETES MELLITUS (HCC): ICD-10-CM

## 2023-07-20 DIAGNOSIS — N18.31 STAGE 3A CHRONIC KIDNEY DISEASE (HCC): ICD-10-CM

## 2023-07-20 DIAGNOSIS — R80.9 MICROALBUMINURIA: ICD-10-CM

## 2023-07-20 DIAGNOSIS — E11.40 TYPE 2 DIABETES MELLITUS WITH DIABETIC NEUROPATHY, WITH LONG-TERM CURRENT USE OF INSULIN (HCC): Primary | ICD-10-CM

## 2023-07-20 DIAGNOSIS — E78.2 MIXED HYPERLIPIDEMIA: ICD-10-CM

## 2023-07-20 DIAGNOSIS — Z79.4 TYPE 2 DIABETES MELLITUS WITH DIABETIC NEUROPATHY, WITH LONG-TERM CURRENT USE OF INSULIN (HCC): Primary | ICD-10-CM

## 2023-07-20 DIAGNOSIS — I10 ESSENTIAL HYPERTENSION: ICD-10-CM

## 2023-07-20 PROCEDURE — 99214 OFFICE O/P EST MOD 30 MIN: CPT | Performed by: NURSE PRACTITIONER

## 2023-07-20 PROCEDURE — 95251 CONT GLUC MNTR ANALYSIS I&R: CPT | Performed by: NURSE PRACTITIONER

## 2023-07-20 NOTE — PATIENT INSTRUCTIONS
Be mindful of diet. Stay active and stay hydrated. For now, continue current regimen with Metformin. Take your Humalog 15 MINUTES BEFORE MEALS !!!!!!!!!!!!     Continue Humalog to 10 units at breakfast.     Continue Humalog 5 units at lunch and with 2 units at mid-afternoon snack. Continue Humalog to 4 units at dinner. Continue Levemir 10 units. Please check your blood sugars 3-4 times daily and forward a record to the office in 1-2 weeks for review and further adjustment, as needed. Continue lasix and lisinopril for treatment of your blood pressure. Continue Omega 3 fatty acid fish oil daily. Obtain lab work as prescribed.

## 2023-07-20 NOTE — PROGRESS NOTES
Sneha Noguera 69 y.o. female MRN: [id number]    Encounter: [id number]      Assessment/Plan     Assessment: This is a 69y.o.-year-old female with type 2 diabetes with neuropathy, hypertension and hyperlipidemia. Plan:  1.  Type 2 diabetes insulin requiring:  Her most recent hemoglobin A1c is 6. 3.  Reviewed her freestyle jerry at the time of the visit. Liliana Kincaid is typically controlled throughout the day. For now she will continue her current regimen.  I have asked her to send a record of her blood sugars to the office in 2 weeks for review so further changes can be made to help her blood sugars throughout the day.  Check hemoglobin A1c prior to next visit.     2.  Hypertension/microalbuminuria: She is normotensive in the office today.  Managed by Cardiology.  Continue lisinopril and Lasix.  Check comprehensive metabolic panel prior to next visit.     3.  Hyperlipidemia:  Continue Omega 3 fatty acid fish oil.     CC: Type 2 Diabetes follow up    History of Present Illness     HPI:  65 y.o. year old female with type 2 diabetes for approximately 18 years.  She is on oral agents and insulin at home and takes Metformin 1000 mg twice daily, Levemir insulin 10 units at bedtime, and Humalog insulin 6 unit at meals. Her most recent hemoglobin A1c from July 10, 2023 is 6.8. She denies any polydipsia, and blurry vision. Liliana Kincaid has no polyphagia. Liliana Kincaid has 2-3 times per night nocturia and polyuria. She notes hypoglycemia after taking Humalog after sometimes over night. She has no chest pain or shortness of breath.  She denies nephropathy, retinopathy, heart attack, stroke and claudication but does admit to neuropathy. Her  passed away in June 2020.     Downloaded her Freestyle Jerry from July 17 through July 20, 2023 reveals an average glucose of 165.  She is in target range 65% of the time, with 34% hyperglycemia with < 1% low.       Her most recent diabetic eye exam was December 13, 2022 and no retinopathy. She complains of some numbness and tingling to her feet at times.  Her most recent diabetic foot exam was performed on 2021 at endocrine office visit.     Her hypertension is treated with Lasix 40 mg daily and lisinopril 5 mg daily.      For her hyperlipidemia, she is taking Omega 3 fatty acid fish oil 1000 mg daily. Review of Systems   Constitutional: Negative. Negative for chills, fatigue and fever. HENT: Negative. Negative for trouble swallowing and voice change. Eyes: Negative. Negative for photophobia, pain, discharge, redness, itching and visual disturbance. Respiratory: Negative. Negative for chest tightness and shortness of breath. Cardiovascular: Positive for leg swelling (lymphedema). Negative for chest pain and palpitations. Gastrointestinal: Negative. Negative for abdominal pain, constipation, diarrhea and vomiting. Endocrine: Positive for polyuria (1-2 times per night). Negative for cold intolerance, heat intolerance, polydipsia and polyphagia. Genitourinary: Negative. Musculoskeletal: Positive for arthralgias and myalgias. Skin: Negative. Allergic/Immunologic: Negative. Neurological: Negative. Negative for dizziness, syncope, light-headedness and headaches. Hematological: Negative. Psychiatric/Behavioral: Negative. All other systems reviewed and are negative.       Historical Information   Past Medical History:   Diagnosis Date   • Asthma     seasonal   • Diabetes mellitus ([address])    • Osteoarthritis    • Tremor, hereditary, benign      Past Surgical History:   Procedure Laterality Date   •  SECTION      3   • REPLACEMENT TOTAL KNEE Left 2021   • TOE SURGERY Bilateral     5 th toe bone removed   • TOTAL HIP ARTHROPLASTY Right    • TUBAL LIGATION Bilateral      Social History   Social History     Substance and Sexual Activity   Alcohol Use Yes   • Alcohol/week: 1.0 standard drink of alcohol   • Types: 1 Standard drinks or equivalent per week    Comment: socially     Social History     Substance and Sexual Activity   Drug Use No     Social History     Tobacco Use   Smoking Status Former   • Packs/day: 1.00   • Years: 15.00   • Total pack years: 15.00   • Types: Cigarettes   • Start date: 5/10/1992   • Quit date: 2007   • Years since quittin.1   Smokeless Tobacco Never     Family History:   Family History   Problem Relation Age of Onset   • Cirrhosis Mother    • Heart disease Mother    • Esophageal varices Mother    • Brain cancer Father    • Prostate cancer Father    • Stroke Father    • Heart attack Father    • Hypertension Father            • Skin cancer Brother    • COPD Brother    • Diabetes type II Maternal Aunt    • Diabetes type II Maternal Grandfather            • Parkinsonism Maternal Grandfather    • Coronary artery disease Brother    • Prostate cancer Brother    • Post-traumatic stress disorder Son    • No Known Problems Son    • No Known Problems Daughter    • Parkinsonism Maternal Aunt    • Diabetes type II Maternal Aunt            • Diabetes type I Cousin        Meds/Allergies   Current Outpatient Medications   Medication Sig Dispense Refill   • BD ULTRA-FINE PEN NEEDLES 29G X 12.7MM MISC USE 4 TIMES DAILY 360 each 3   • Calcium Carbonate (CALCIUM 600 PO) Take 1 tablet by mouth daily With vitamin d     • Continuous Blood Gluc  (FreeStyle Mae 14 Day [place]) BARRIE Use 1 Device 4 (four) times a day (before meals and at bedtime) 1 each 0   • Continuous Blood Gluc Sensor (FreeStyle Mae 14 Day Sensor) MISC Use 1 application every 14 (fourteen) days 2 each 6   • Eliquis 5 MG Take 5 mg by mouth 2 (two) times a day     • ferrous sulfate 325 (65 Fe) mg tablet Take 325 mg by mouth 2 (two) times a day     • insulin lispro (HumaLOG KwikPen) 100 units/mL injection pen INJECT SUBCUTANEOUSLY 2 TO  7 UNITS 4 TIMES DAILY AS  DIRECTED (Patient taking differently: INJECT 5 units breakfast, 10 units lunch, 2 units pm snack, 4 units dinner) 30 mL 4   • Levemir FlexTouch 100 units/mL injection pen INJECT 18 TO 20 UNITS  SUBCUTANEOUSLY DAILY AT  BEDTIME (Patient taking differently: 10 units at night.) 30 mL 4   • lisinopril (ZESTRIL) 10 mg tablet Take 10 mg by mouth daily     • Magnesium 400 MG CAPS Take by mouth in the morning     • metFORMIN (GLUCOPHAGE) 1000 MG tablet TAKE 1 TABLET BY MOUTH  TWICE DAILY WITH MEALS 180 tablet 3   • metoprolol tartrate (LOPRESSOR) 25 mg tablet Take 25 mg by mouth 2 (two) times a day     • Multiple Vitamins-Minerals (WOMENS 50+ MULTI VITAMIN/MIN PO) Take by mouth     • Omega-3 Fatty Acids (FISH OIL) 1,000 mg Take 1,000 mg by mouth daily     • rosuvastatin (CRESTOR) 10 MG tablet Take 10 mg by mouth daily     • vitamin E, tocopherol, 400 units capsule Take 400 Units by mouth daily     • albuterol (PROVENTIL HFA,VENTOLIN HFA) 90 mcg/act inhaler Inhale (Patient not taking: Reported on 7/20/2023)     • docusate sodium (COLACE) 100 mg capsule Take 100 mg by mouth daily  (Patient not taking: Reported on 7/20/2023)     • furosemide (LASIX) 40 mg tablet TAKE 1 TABLET BY MOUTH  DAILY AS NEEDED (Patient not taking: Reported on 7/20/2023) 90 tablet 3     No current facility-administered medications for this visit. Allergies   Allergen Reactions   • Actos [Pioglitazone] Edema     Severe lower extremity   • Gentamicin    • Other    • Oxycodone GI Intolerance   • Sulfa Antibiotics        Objective   Vitals: Blood pressure 120/70, pulse 73, height 5' 7" (1.702 m), weight 105 kg (231 lb 3.2 oz), SpO2 98 %. Physical Exam  Vitals reviewed. Constitutional:       Appearance: She is well-developed. She is obese. HENT:      Head: Normocephalic and atraumatic. Eyes:      Conjunctiva/sclera: Conjunctivae normal.      Pupils: Pupils are equal, round, and reactive to light. Comments: Wears glasses   Cardiovascular:      Rate and Rhythm: Normal rate and regular rhythm.       Heart sounds: Normal heart sounds. Pulmonary:      Effort: Pulmonary effort is normal.      Breath sounds: Normal breath sounds. Abdominal:      General: Bowel sounds are normal.      Palpations: Abdomen is soft. Musculoskeletal:         General: Normal range of motion. Cervical back: Normal range of motion and neck supple. Right lower leg: Edema present. Left lower leg: Edema present. Skin:     General: Skin is warm and dry. Neurological:      Mental Status: She is alert and oriented to person, place, and time. Psychiatric:         Behavior: Behavior normal.         Thought Content:  Thought content normal.         Judgment: Judgment normal.         Lab Results:   Lab Results   Component Value Date/Time    Hemoglobin A1C 6.8 (H) 07/10/2023 07:23 AM    Hemoglobin A1C 6.6 (H) 03/20/2023 10:17 AM    Hemoglobin A1C 6.3 (H) 12/09/2022 02:45 PM    WBC 6.16 07/10/2023 07:23 AM    WBC 5.92 03/20/2023 10:17 AM    WBC 8.34 12/09/2022 02:45 PM    Hemoglobin 11.7 07/10/2023 07:23 AM    Hemoglobin 11.4 (L) 03/20/2023 10:17 AM    Hemoglobin 10.5 (L) 12/09/2022 02:45 PM    Hematocrit 36.5 07/10/2023 07:23 AM    Hematocrit 35.4 03/20/2023 10:17 AM    Hematocrit 33.6 (L) 12/09/2022 02:45 PM    MCV 95 07/10/2023 07:23 AM    MCV 94 03/20/2023 10:17 AM    MCV 98 12/09/2022 02:45 PM    Platelets 447 11/22/0656 07:23 AM    Platelets 368 07/60/0242 10:17 AM    Platelets 073 21/54/9339 02:45 PM    BUN 20 07/10/2023 07:23 AM    BUN 16 03/20/2023 10:17 AM    BUN 14 12/09/2022 02:45 PM    Potassium 4.4 07/10/2023 07:23 AM    Potassium 4.7 03/20/2023 10:17 AM    Potassium 4.7 12/09/2022 02:45 PM    Chloride 107 07/10/2023 07:23 AM    Chloride 105 03/20/2023 10:17 AM    Chloride 103 12/09/2022 02:45 PM    CO2 31 07/10/2023 07:23 AM    CO2 32 03/20/2023 10:17 AM    CO2 28 12/09/2022 02:45 PM    Creatinine 1.12 07/10/2023 07:23 AM    Creatinine 1.15 03/20/2023 10:17 AM    Creatinine 1.16 12/09/2022 02:45 PM    AST 19 07/10/2023 07:23 AM AST 19 03/20/2023 10:17 AM    AST 17 12/09/2022 02:45 PM    ALT 18 07/10/2023 07:23 AM    ALT 21 03/20/2023 10:17 AM    ALT 21 12/09/2022 02:45 PM    Total Protein 6.8 07/10/2023 07:23 AM    Total Protein 6.8 03/20/2023 10:17 AM    Total Protein 7.3 12/09/2022 02:45 PM    Albumin 3.6 07/10/2023 07:23 AM    Albumin 3.6 03/20/2023 10:17 AM    Albumin 3.5 12/09/2022 02:45 PM    HDL, Direct 70 03/20/2023 10:17 AM    HDL, Direct 62 09/08/2022 09:09 AM    Triglycerides 67 03/20/2023 10:17 AM    Triglycerides 50 09/08/2022 09:09 AM     Portions of the record may have been created with voice recognition software. Occasional wrong word or "sound a like" substitutions may have occurred due to the inherent limitations of voice recognition software. Read the chart carefully and recognize, using context, where substitutions have occurred.

## 2023-08-05 ENCOUNTER — AMB VIDEO VISIT (OUTPATIENT)
Dept: OTHER | Facility: HOSPITAL | Age: 68
End: 2023-08-05

## 2023-10-20 ENCOUNTER — TELEPHONE (OUTPATIENT)
Dept: ENDOCRINOLOGY | Facility: HOSPITAL | Age: 68
End: 2023-10-20

## 2023-10-20 NOTE — TELEPHONE ENCOUNTER
The patient called in this afternoon to make us aware that she was not feeling well and was recently diagnosed with COVID. As a results she has been having issues with her sugars going high. She stated however as of 1/2 an hour prior to her calling she checked her sugars and she stated that she was 93. She has been decreasing her insulin dose as well because as a result of being sick she is not eating and has no appetite. She currently is on Paxlovid as well. I spoke with Dr. Layla Love and she stated that she is doing the right thing and she should keep up with checking her sugars and she should keep herself as hydrated as well. She stated that she should try to eat as much as she can and interchanging  sugar for non sugar items and carbs for non carb items. Per the provider she is to monitor herself through out the weekend and she is to call if there if she is having any more issues at this time.

## 2023-11-03 ENCOUNTER — APPOINTMENT (OUTPATIENT)
Dept: LAB | Facility: CLINIC | Age: 68
End: 2023-11-03
Payer: MEDICARE

## 2023-11-03 DIAGNOSIS — E11.29 MICROALBUMINURIA DUE TO TYPE 2 DIABETES MELLITUS: ICD-10-CM

## 2023-11-03 DIAGNOSIS — Z79.4 TYPE 2 DIABETES MELLITUS WITH DIABETIC NEUROPATHY, WITH LONG-TERM CURRENT USE OF INSULIN (HCC): ICD-10-CM

## 2023-11-03 DIAGNOSIS — E11.40 TYPE 2 DIABETES MELLITUS WITH DIABETIC NEUROPATHY, WITH LONG-TERM CURRENT USE OF INSULIN (HCC): ICD-10-CM

## 2023-11-03 DIAGNOSIS — N18.31 STAGE 3A CHRONIC KIDNEY DISEASE (HCC): ICD-10-CM

## 2023-11-03 DIAGNOSIS — R80.9 MICROALBUMINURIA DUE TO TYPE 2 DIABETES MELLITUS: ICD-10-CM

## 2023-11-03 DIAGNOSIS — I89.0 LYMPHEDEMA: ICD-10-CM

## 2023-11-03 DIAGNOSIS — I10 ESSENTIAL HYPERTENSION: ICD-10-CM

## 2023-11-03 DIAGNOSIS — E66.01 OBESITY, MORBID (HCC): ICD-10-CM

## 2023-11-03 DIAGNOSIS — E78.2 MIXED HYPERLIPIDEMIA: ICD-10-CM

## 2023-11-03 DIAGNOSIS — R80.9 MICROALBUMINURIA: ICD-10-CM

## 2023-11-03 LAB
ALBUMIN SERPL BCP-MCNC: 3.7 G/DL (ref 3.5–5)
ALP SERPL-CCNC: 56 U/L (ref 34–104)
ALT SERPL W P-5'-P-CCNC: 10 U/L (ref 7–52)
ANION GAP SERPL CALCULATED.3IONS-SCNC: 8 MMOL/L
AST SERPL W P-5'-P-CCNC: 15 U/L (ref 13–39)
BASOPHILS # BLD AUTO: 0.03 THOUSANDS/ÂΜL (ref 0–0.1)
BASOPHILS NFR BLD AUTO: 1 % (ref 0–1)
BILIRUB SERPL-MCNC: 0.47 MG/DL (ref 0.2–1)
BUN SERPL-MCNC: 14 MG/DL (ref 5–25)
CALCIUM SERPL-MCNC: 9.5 MG/DL (ref 8.4–10.2)
CHLORIDE SERPL-SCNC: 100 MMOL/L (ref 96–108)
CHOLEST SERPL-MCNC: 136 MG/DL
CO2 SERPL-SCNC: 29 MMOL/L (ref 21–32)
CREAT SERPL-MCNC: 1.02 MG/DL (ref 0.6–1.3)
EOSINOPHIL # BLD AUTO: 0.27 THOUSAND/ÂΜL (ref 0–0.61)
EOSINOPHIL NFR BLD AUTO: 5 % (ref 0–6)
ERYTHROCYTE [DISTWIDTH] IN BLOOD BY AUTOMATED COUNT: 11.8 % (ref 11.6–15.1)
EST. AVERAGE GLUCOSE BLD GHB EST-MCNC: 169 MG/DL
GFR SERPL CREATININE-BSD FRML MDRD: 55 ML/MIN/1.73SQ M
GLUCOSE P FAST SERPL-MCNC: 196 MG/DL (ref 65–99)
HBA1C MFR BLD: 7.5 %
HCT VFR BLD AUTO: 34.3 % (ref 34.8–46.1)
HDLC SERPL-MCNC: 60 MG/DL
HGB BLD-MCNC: 11 G/DL (ref 11.5–15.4)
IMM GRANULOCYTES # BLD AUTO: 0.01 THOUSAND/UL (ref 0–0.2)
IMM GRANULOCYTES NFR BLD AUTO: 0 % (ref 0–2)
LDLC SERPL CALC-MCNC: 66 MG/DL (ref 0–100)
LYMPHOCYTES # BLD AUTO: 1.95 THOUSANDS/ÂΜL (ref 0.6–4.47)
LYMPHOCYTES NFR BLD AUTO: 32 % (ref 14–44)
MCH RBC QN AUTO: 31.3 PG (ref 26.8–34.3)
MCHC RBC AUTO-ENTMCNC: 32.1 G/DL (ref 31.4–37.4)
MCV RBC AUTO: 98 FL (ref 82–98)
MONOCYTES # BLD AUTO: 0.61 THOUSAND/ÂΜL (ref 0.17–1.22)
MONOCYTES NFR BLD AUTO: 10 % (ref 4–12)
NEUTROPHILS # BLD AUTO: 3.18 THOUSANDS/ÂΜL (ref 1.85–7.62)
NEUTS SEG NFR BLD AUTO: 52 % (ref 43–75)
NONHDLC SERPL-MCNC: 76 MG/DL
NRBC BLD AUTO-RTO: 0 /100 WBCS
PLATELET # BLD AUTO: 316 THOUSANDS/UL (ref 149–390)
PMV BLD AUTO: 9.8 FL (ref 8.9–12.7)
POTASSIUM SERPL-SCNC: 4.4 MMOL/L (ref 3.5–5.3)
PROT SERPL-MCNC: 6.4 G/DL (ref 6.4–8.4)
RBC # BLD AUTO: 3.51 MILLION/UL (ref 3.81–5.12)
SODIUM SERPL-SCNC: 137 MMOL/L (ref 135–147)
TRIGL SERPL-MCNC: 48 MG/DL
TSH SERPL DL<=0.05 MIU/L-ACNC: 2.57 UIU/ML (ref 0.45–4.5)
WBC # BLD AUTO: 6.05 THOUSAND/UL (ref 4.31–10.16)

## 2023-11-03 PROCEDURE — 85025 COMPLETE CBC W/AUTO DIFF WBC: CPT

## 2023-11-03 PROCEDURE — 80053 COMPREHEN METABOLIC PANEL: CPT

## 2023-11-03 PROCEDURE — 36415 COLL VENOUS BLD VENIPUNCTURE: CPT

## 2023-11-03 PROCEDURE — 80061 LIPID PANEL: CPT

## 2023-11-03 PROCEDURE — 83036 HEMOGLOBIN GLYCOSYLATED A1C: CPT

## 2023-11-03 PROCEDURE — 84443 ASSAY THYROID STIM HORMONE: CPT

## 2023-11-09 ENCOUNTER — OFFICE VISIT (OUTPATIENT)
Dept: ENDOCRINOLOGY | Facility: HOSPITAL | Age: 68
End: 2023-11-09
Payer: MEDICARE

## 2023-11-09 VITALS
WEIGHT: 222.4 LBS | BODY MASS INDEX: 34.91 KG/M2 | SYSTOLIC BLOOD PRESSURE: 124 MMHG | HEIGHT: 67 IN | DIASTOLIC BLOOD PRESSURE: 80 MMHG | HEART RATE: 60 BPM

## 2023-11-09 DIAGNOSIS — E11.40 TYPE 2 DIABETES MELLITUS WITH DIABETIC NEUROPATHY, WITH LONG-TERM CURRENT USE OF INSULIN (HCC): Primary | ICD-10-CM

## 2023-11-09 DIAGNOSIS — I10 ESSENTIAL HYPERTENSION: ICD-10-CM

## 2023-11-09 DIAGNOSIS — Z79.4 TYPE 2 DIABETES MELLITUS WITH DIABETIC NEUROPATHY, WITH LONG-TERM CURRENT USE OF INSULIN (HCC): Primary | ICD-10-CM

## 2023-11-09 DIAGNOSIS — E78.2 MIXED HYPERLIPIDEMIA: ICD-10-CM

## 2023-11-09 DIAGNOSIS — E11.29 MICROALBUMINURIA DUE TO TYPE 2 DIABETES MELLITUS: ICD-10-CM

## 2023-11-09 DIAGNOSIS — R80.9 MICROALBUMINURIA DUE TO TYPE 2 DIABETES MELLITUS: ICD-10-CM

## 2023-11-09 DIAGNOSIS — E66.01 OBESITY, MORBID (HCC): ICD-10-CM

## 2023-11-09 PROCEDURE — 95251 CONT GLUC MNTR ANALYSIS I&R: CPT | Performed by: NURSE PRACTITIONER

## 2023-11-09 PROCEDURE — 99214 OFFICE O/P EST MOD 30 MIN: CPT | Performed by: NURSE PRACTITIONER

## 2023-11-09 NOTE — PROGRESS NOTES
R[place] Lucero 77 y.o. female MRN: [id number]    Encounter: [id number]      Assessment/Plan     Assessment: This is a 77y.o.-year-old female with type 2 diabetes with neuropathy, hypertension and hyperlipidemia. Plan:  1. Type 2 diabetes insulin requiring:  Her most recent hemoglobin A1c is 7.5. Reviewed her freestyle jerry at the time of the visit. Is experiencing some hypoglycemia after lunch followed by hyperglycemia overnight after dinner. For this I have asked her to continue Humalog 10 units at breakfast, decrease Humalog 4 units at lunch and increase to 6 units at dinner. She will also increase her Levemir to 15 units. I have asked her to send a record of her blood sugars to the office in 2 weeks for review so further changes can be made to help her blood sugars throughout the day. Check hemoglobin A1c prior to next visit. 2.  Hypertension/microalbuminuria: She is normotensive in the office today. Managed by Cardiology. Continue Lisinopril and Lasix. Check comprehensive metabolic panel prior to next visit. 3.  Hyperlipidemia:  Stable. Continue Omega 3 fatty acid fish oil. CC: Type 2 Diabetes follow up    History of Present Illness     HPI:  77y.o. year old female with type 2 diabetes for approximately 18 years. She is on oral agents and insulin at home and takes Metformin 1000 mg twice daily, Levemir insulin 10 units at bedtime, and Humalog insulin 6 unit at meals. Her most recent hemoglobin A1c from November 3, 2023 is 7.5. She denies any polydipsia, and blurry vision. She has no polyphagia. She has 2-3 times per night nocturia and polyuria. She notes hypoglycemia after taking Humalog after sometimes over night. She has no chest pain or shortness of breath. She denies nephropathy, retinopathy, heart attack, stroke and claudication but does admit to neuropathy. Her  passed away in June 2020. She was diagnosed with COVID in October 2023 and was treated with Paxlovid. Downloaded her Freestyle Mae from  through 2023 reveals an average glucose of 165. She is in target range 65% of the time, with 34% hyperglycemia with < 1% low. Her most recent diabetic eye exam was 2022 and no retinopathy. She complains of some numbness and tingling to her feet at times. Her most recent diabetic foot exam was performed on 2021 at endocrine office visit. Her hypertension is treated with Lasix 40 mg daily and lisinopril 5 mg daily. For her hyperlipidemia, she is taking Omega 3 fatty acid fish oil 1000 mg daily. Review of Systems   Constitutional: Negative. Negative for chills, fatigue and fever. HENT: Negative. Negative for trouble swallowing and voice change. Eyes: Negative. Negative for photophobia, pain, discharge, redness, itching and visual disturbance. Respiratory: Negative. Negative for chest tightness and shortness of breath. Cardiovascular:  Positive for leg swelling (lymphedema). Negative for chest pain. Gastrointestinal: Negative. Negative for abdominal pain, constipation, diarrhea and vomiting. Endocrine: Positive for polyuria (1-2 times per night). Negative for cold intolerance, heat intolerance, polydipsia and polyphagia. Genitourinary: Negative. Musculoskeletal:  Positive for arthralgias and myalgias. Skin: Negative. Allergic/Immunologic: Negative. Neurological: Negative. Negative for dizziness, syncope, light-headedness and headaches. Hematological: Negative. Psychiatric/Behavioral: Negative. All other systems reviewed and are negative.       Historical Information   Past Medical History:   Diagnosis Date    Asthma     seasonal    Diabetes mellitus (HCC)     Osteoarthritis     Tremor, hereditary, benign      Past Surgical History:   Procedure Laterality Date     SECTION      3    REPLACEMENT TOTAL KNEE Left 2021    TOE SURGERY Bilateral     5 th toe bone removed    TOTAL HIP ARTHROPLASTY Right     TUBAL LIGATION Bilateral      Social History   Social History     Substance and Sexual Activity   Alcohol Use Yes    Alcohol/week: 1.0 standard drink of alcohol    Types: 1 Standard drinks or equivalent per week    Comment: socially     Social History     Substance and Sexual Activity   Drug Use No     Social History     Tobacco Use   Smoking Status Former    Packs/day: 1.00    Years: 15.00    Total pack years: 15.00    Types: Cigarettes    Start date: 5/10/1992    Quit date: 2007    Years since quittin.4   Smokeless Tobacco Never     Family History:   Family History   Problem Relation Age of Onset    Cirrhosis Mother     Heart disease Mother     Esophageal varices Mother     Brain cancer Father     Prostate cancer Father     Stroke Father     Heart attack Father     Hypertension Father             Skin cancer Brother     COPD Brother     Diabetes type II Maternal Aunt     Diabetes type II Maternal Grandfather             Parkinsonism Maternal Grandfather     Coronary artery disease Brother     Prostate cancer Brother     Post-traumatic stress disorder Son     No Known Problems Son     No Known Problems Daughter     Parkinsonism Maternal Aunt     Diabetes type II Maternal Aunt             Diabetes type I Cousin        Meds/Allergies   Current Outpatient Medications   Medication Sig Dispense Refill    BD ULTRA-FINE PEN NEEDLES 29G X 12.7MM MISC USE 4 TIMES DAILY 360 each 3    Calcium Carbonate (CALCIUM 600 PO) Take 1 tablet by mouth daily With vitamin d      Continuous Blood Gluc Sensor (FreeStyle Mae 14 Day Sensor) MISC Use 1 application every 14 (fourteen) days 2 each 6    Eliquis 5 MG Take 5 mg by mouth 2 (two) times a day      ferrous sulfate 325 (65 Fe) mg tablet Take 325 mg by mouth 2 (two) times a day      insulin lispro (HumaLOG KwikPen) 100 units/mL injection pen INJECT SUBCUTANEOUSLY 2 TO  7 UNITS 4 TIMES DAILY AS  DIRECTED (Patient taking differently: INJECT 5 units breakfast, 10 units lunch, 2 units pm snack, 4 units dinner) 30 mL 4    Levemir FlexTouch 100 units/mL injection pen INJECT 18 TO 20 UNITS  SUBCUTANEOUSLY DAILY AT  BEDTIME (Patient taking differently: 10 units at night.) 30 mL 4    lisinopril (ZESTRIL) 10 mg tablet Take 10 mg by mouth daily      Magnesium 400 MG CAPS Take by mouth in the morning      metFORMIN (GLUCOPHAGE) 1000 MG tablet TAKE 1 TABLET BY MOUTH  TWICE DAILY WITH MEALS 180 tablet 3    metoprolol tartrate (LOPRESSOR) 25 mg tablet Take 25 mg by mouth 2 (two) times a day      Multiple Vitamins-Minerals (WOMENS 50+ MULTI VITAMIN/MIN PO) Take by mouth      Omega-3 Fatty Acids (FISH OIL) 1,000 mg Take 1,000 mg by mouth daily      rosuvastatin (CRESTOR) 10 MG tablet Take 10 mg by mouth daily      vitamin E, tocopherol, 400 units capsule Take 400 Units by mouth daily      albuterol (PROVENTIL HFA,VENTOLIN HFA) 90 mcg/act inhaler Inhale (Patient not taking: Reported on 7/20/2023)      docusate sodium (COLACE) 100 mg capsule Take 100 mg by mouth daily  (Patient not taking: Reported on 7/20/2023)      furosemide (LASIX) 40 mg tablet TAKE 1 TABLET BY MOUTH  DAILY AS NEEDED (Patient not taking: Reported on 7/20/2023) 90 tablet 3     No current facility-administered medications for this visit. Allergies   Allergen Reactions    Actos [Pioglitazone] Edema     Severe lower extremity    Gentamicin     Other     Oxycodone GI Intolerance    Sulfa Antibiotics        Objective   Vitals: Height 5' 7" (1.702 m), weight 101 kg (222 lb 6.4 oz). Physical Exam  Vitals reviewed. Constitutional:       Appearance: She is well-developed. She is obese. HENT:      Head: Normocephalic and atraumatic. Eyes:      Conjunctiva/sclera: Conjunctivae normal.      Pupils: Pupils are equal, round, and reactive to light. Comments: Wears glasses   Cardiovascular:      Rate and Rhythm: Normal rate and regular rhythm.       Pulses: no weak pulses          Dorsalis pedis pulses are 1+ on the right side and 1+ on the left side. Posterior tibial pulses are 1+ on the right side and 1+ on the left side. Heart sounds: Normal heart sounds. Pulmonary:      Effort: Pulmonary effort is normal.      Breath sounds: Normal breath sounds. Abdominal:      General: Bowel sounds are normal.      Palpations: Abdomen is soft. Musculoskeletal:         General: Normal range of motion. Cervical back: Normal range of motion and neck supple. Right lower leg: Edema present. Left lower leg: Edema present. Feet:      Right foot:      Skin integrity: No ulcer, skin breakdown, erythema, warmth, callus or dry skin. Left foot:      Skin integrity: No ulcer, skin breakdown, erythema, warmth, callus or dry skin. Skin:     General: Skin is warm and dry. Neurological:      Mental Status: She is alert and oriented to person, place, and time. Psychiatric:         Behavior: Behavior normal.         Thought Content: Thought content normal.         Judgment: Judgment normal.       Patient's shoes and socks removed. Right Foot/Ankle   Right Foot Inspection  Skin Exam: skin normal and skin intact. No dry skin, no warmth, no callus, no erythema, no maceration, no abnormal color, no pre-ulcer, no ulcer and no callus. Toe Exam: ROM and strength within normal limits and swelling. Sensory   Monofilament testing: intact    Vascular  Capillary refills: < 3 seconds  The right DP pulse is 1+. The right PT pulse is 1+. Left Foot/Ankle  Left Foot Inspection  Skin Exam: skin normal and skin intact. No dry skin, no warmth, no erythema, no maceration, normal color, no pre-ulcer, no ulcer and no callus. Toe Exam: ROM and strength within normal limits and swelling. Sensory   Monofilament testing: intact    Vascular  Capillary refills: < 3 seconds  The left DP pulse is 1+. The left PT pulse is 1+.      Assign Risk Category  No deformity present  No loss of protective sensation  No weak pulses  Risk: 0    Lab Results:   Lab Results   Component Value Date/Time    Hemoglobin A1C 7.5 (H) 11/03/2023 09:54 AM    Hemoglobin A1C 6.8 (H) 07/10/2023 07:23 AM    Hemoglobin A1C 6.6 (H) 03/20/2023 10:17 AM    WBC 6.05 11/03/2023 09:54 AM    WBC 6.16 07/10/2023 07:23 AM    WBC 5.92 03/20/2023 10:17 AM    Hemoglobin 11.0 (L) 11/03/2023 09:54 AM    Hemoglobin 11.7 07/10/2023 07:23 AM    Hemoglobin 11.4 (L) 03/20/2023 10:17 AM    Hematocrit 34.3 (L) 11/03/2023 09:54 AM    Hematocrit 36.5 07/10/2023 07:23 AM    Hematocrit 35.4 03/20/2023 10:17 AM    MCV 98 11/03/2023 09:54 AM    MCV 95 07/10/2023 07:23 AM    MCV 94 03/20/2023 10:17 AM    Platelets 134 42/88/0869 09:54 AM    Platelets 219 53/96/2759 07:23 AM    Platelets 273 29/92/2674 10:17 AM    BUN 14 11/03/2023 09:54 AM    BUN 20 07/10/2023 07:23 AM    BUN 16 03/20/2023 10:17 AM    Potassium 4.4 11/03/2023 09:54 AM    Potassium 4.4 07/10/2023 07:23 AM    Potassium 4.7 03/20/2023 10:17 AM    Chloride 100 11/03/2023 09:54 AM    Chloride 107 07/10/2023 07:23 AM    Chloride 105 03/20/2023 10:17 AM    CO2 29 11/03/2023 09:54 AM    CO2 31 07/10/2023 07:23 AM    CO2 32 03/20/2023 10:17 AM    Creatinine 1.02 11/03/2023 09:54 AM    Creatinine 1.12 07/10/2023 07:23 AM    Creatinine 1.15 03/20/2023 10:17 AM    AST 15 11/03/2023 09:54 AM    AST 19 07/10/2023 07:23 AM    AST 19 03/20/2023 10:17 AM    ALT 10 11/03/2023 09:54 AM    ALT 18 07/10/2023 07:23 AM    ALT 21 03/20/2023 10:17 AM    Total Protein 6.4 11/03/2023 09:54 AM    Total Protein 6.8 07/10/2023 07:23 AM    Total Protein 6.8 03/20/2023 10:17 AM    Albumin 3.7 11/03/2023 09:54 AM    Albumin 3.6 07/10/2023 07:23 AM    Albumin 3.6 03/20/2023 10:17 AM    HDL, Direct 60 11/03/2023 09:54 AM    HDL, Direct 70 03/20/2023 10:17 AM    Triglycerides 48 11/03/2023 09:54 AM    Triglycerides 67 03/20/2023 10:17 AM     Portions of the record may have been created with voice recognition software. Occasional wrong word or "sound a like" substitutions may have occurred due to the inherent limitations of voice recognition software. Read the chart carefully and recognize, using context, where substitutions have occurred.

## 2023-11-09 NOTE — PATIENT INSTRUCTIONS
Be mindful of diet. Stay active and stay hydrated. For now, continue current regimen with Metformin. Take your Humalog 15 MINUTES BEFORE MEALS !!!!!!!!!!!!     Continue Humalog to 10 units at breakfast.     Decrease Humalog to 4 units at lunch and continue with 2 units at mid-afternoon snack. Increase Humalog to 6 units at dinner. Increase Levemir to 15 units. Please check your blood sugars 3-4 times daily and forward a record to the office in 1-2 weeks for review and further adjustment, as needed. Continue lasix and lisinopril for treatment of your blood pressure. Continue Omega 3 fatty acid fish oil daily. Obtain lab work as prescribed.

## 2024-02-09 ENCOUNTER — TELEPHONE (OUTPATIENT)
Dept: OTHER | Facility: OTHER | Age: 69
End: 2024-02-09

## 2024-02-09 ENCOUNTER — APPOINTMENT (OUTPATIENT)
Dept: LAB | Facility: CLINIC | Age: 69
End: 2024-02-09
Payer: MEDICARE

## 2024-02-09 DIAGNOSIS — E11.29 MICROALBUMINURIA DUE TO TYPE 2 DIABETES MELLITUS: ICD-10-CM

## 2024-02-09 DIAGNOSIS — E66.01 OBESITY, MORBID (HCC): ICD-10-CM

## 2024-02-09 DIAGNOSIS — Z79.4 TYPE 2 DIABETES MELLITUS WITH DIABETIC NEUROPATHY, WITH LONG-TERM CURRENT USE OF INSULIN (HCC): ICD-10-CM

## 2024-02-09 DIAGNOSIS — E11.40 TYPE 2 DIABETES MELLITUS WITH DIABETIC NEUROPATHY, WITH LONG-TERM CURRENT USE OF INSULIN (HCC): ICD-10-CM

## 2024-02-09 DIAGNOSIS — E78.2 MIXED HYPERLIPIDEMIA: ICD-10-CM

## 2024-02-09 DIAGNOSIS — R80.9 MICROALBUMINURIA DUE TO TYPE 2 DIABETES MELLITUS: ICD-10-CM

## 2024-02-09 DIAGNOSIS — I10 ESSENTIAL HYPERTENSION: ICD-10-CM

## 2024-02-09 LAB
ALBUMIN SERPL BCP-MCNC: 4 G/DL (ref 3.5–5)
ALP SERPL-CCNC: 47 U/L (ref 34–104)
ALT SERPL W P-5'-P-CCNC: 11 U/L (ref 7–52)
ANION GAP SERPL CALCULATED.3IONS-SCNC: 8 MMOL/L
AST SERPL W P-5'-P-CCNC: 16 U/L (ref 13–39)
BASOPHILS # BLD AUTO: 0.05 THOUSANDS/ÂΜL (ref 0–0.1)
BASOPHILS NFR BLD AUTO: 1 % (ref 0–1)
BILIRUB SERPL-MCNC: 0.45 MG/DL (ref 0.2–1)
BUN SERPL-MCNC: 17 MG/DL (ref 5–25)
CALCIUM SERPL-MCNC: 9.8 MG/DL (ref 8.4–10.2)
CHLORIDE SERPL-SCNC: 102 MMOL/L (ref 96–108)
CO2 SERPL-SCNC: 31 MMOL/L (ref 21–32)
CREAT SERPL-MCNC: 0.99 MG/DL (ref 0.6–1.3)
CREAT UR-MCNC: 90.6 MG/DL
EOSINOPHIL # BLD AUTO: 0.29 THOUSAND/ÂΜL (ref 0–0.61)
EOSINOPHIL NFR BLD AUTO: 5 % (ref 0–6)
ERYTHROCYTE [DISTWIDTH] IN BLOOD BY AUTOMATED COUNT: 11.8 % (ref 11.6–15.1)
EST. AVERAGE GLUCOSE BLD GHB EST-MCNC: 154 MG/DL
GFR SERPL CREATININE-BSD FRML MDRD: 57 ML/MIN/1.73SQ M
GLUCOSE P FAST SERPL-MCNC: 32 MG/DL (ref 65–99)
HBA1C MFR BLD: 7 %
HCT VFR BLD AUTO: 34 % (ref 34.8–46.1)
HGB BLD-MCNC: 11.2 G/DL (ref 11.5–15.4)
IMM GRANULOCYTES # BLD AUTO: 0.01 THOUSAND/UL (ref 0–0.2)
IMM GRANULOCYTES NFR BLD AUTO: 0 % (ref 0–2)
LYMPHOCYTES # BLD AUTO: 2.22 THOUSANDS/ÂΜL (ref 0.6–4.47)
LYMPHOCYTES NFR BLD AUTO: 37 % (ref 14–44)
MCH RBC QN AUTO: 30.9 PG (ref 26.8–34.3)
MCHC RBC AUTO-ENTMCNC: 32.9 G/DL (ref 31.4–37.4)
MCV RBC AUTO: 94 FL (ref 82–98)
MICROALBUMIN UR-MCNC: <7 MG/L
MICROALBUMIN/CREAT 24H UR: <8 MG/G CREATININE (ref 0–30)
MONOCYTES # BLD AUTO: 0.53 THOUSAND/ÂΜL (ref 0.17–1.22)
MONOCYTES NFR BLD AUTO: 9 % (ref 4–12)
NEUTROPHILS # BLD AUTO: 2.84 THOUSANDS/ÂΜL (ref 1.85–7.62)
NEUTS SEG NFR BLD AUTO: 48 % (ref 43–75)
NRBC BLD AUTO-RTO: 0 /100 WBCS
PLATELET # BLD AUTO: 267 THOUSANDS/UL (ref 149–390)
PMV BLD AUTO: 9.7 FL (ref 8.9–12.7)
POTASSIUM SERPL-SCNC: 4.2 MMOL/L (ref 3.5–5.3)
PROT SERPL-MCNC: 6.4 G/DL (ref 6.4–8.4)
RBC # BLD AUTO: 3.63 MILLION/UL (ref 3.81–5.12)
SODIUM SERPL-SCNC: 141 MMOL/L (ref 135–147)
WBC # BLD AUTO: 5.94 THOUSAND/UL (ref 4.31–10.16)

## 2024-02-09 PROCEDURE — 36415 COLL VENOUS BLD VENIPUNCTURE: CPT

## 2024-02-09 PROCEDURE — 82570 ASSAY OF URINE CREATININE: CPT

## 2024-02-09 PROCEDURE — 80053 COMPREHEN METABOLIC PANEL: CPT

## 2024-02-09 PROCEDURE — 85025 COMPLETE CBC W/AUTO DIFF WBC: CPT

## 2024-02-09 PROCEDURE — 83036 HEMOGLOBIN GLYCOSYLATED A1C: CPT

## 2024-02-09 PROCEDURE — 82043 UR ALBUMIN QUANTITATIVE: CPT

## 2024-02-10 NOTE — TELEPHONE ENCOUNTER
"Received above message on TT.     Discussed above with patient. She attributes hypoglycemia to fasting for 12 hours in preparation for lab work and eating a low-carb dinner. She took 9 units of Levemir the night before. Her Mae read 54 at the time of the lab draw and she felt \"fuzzy\". She had a soda after the lab draw was complete. She has been adjusting her insulin doses at home based on her blood glucose levels and takes a bedtime snack. She has an upcoming appointment with Mark Vences on 2/15 which she is aware of and will attend.     For now will continue her current regimen. Discussed hypoglycemia treatment and she is aware and agreeable.   "

## 2024-02-10 NOTE — TELEPHONE ENCOUNTER
PT: Bianca Sena : 1955  Lab Result: Glucose 32   Date/Time Drawn: 2024 @ 08:50 am   Ordering Provider: Alexei Vences   Lab Personnel's Name: Sydnie HAYDEN [facility] [phone]       The following critical/stat result was read back to the lab as stated above and [facility] [employer] messaged to the on-call provider. The provider confirmed receipt of the message.

## 2024-02-15 ENCOUNTER — OFFICE VISIT (OUTPATIENT)
Dept: ENDOCRINOLOGY | Facility: HOSPITAL | Age: 69
End: 2024-02-15
Payer: MEDICARE

## 2024-02-15 VITALS
BODY MASS INDEX: 35.79 KG/M2 | SYSTOLIC BLOOD PRESSURE: 114 MMHG | OXYGEN SATURATION: 97 % | HEIGHT: 67 IN | WEIGHT: 228 LBS | HEART RATE: 65 BPM | DIASTOLIC BLOOD PRESSURE: 70 MMHG

## 2024-02-15 DIAGNOSIS — E78.2 MIXED HYPERLIPIDEMIA: ICD-10-CM

## 2024-02-15 DIAGNOSIS — R80.9 MICROALBUMINURIA DUE TO TYPE 2 DIABETES MELLITUS: ICD-10-CM

## 2024-02-15 DIAGNOSIS — E66.01 OBESITY, MORBID (HCC): ICD-10-CM

## 2024-02-15 DIAGNOSIS — I10 ESSENTIAL HYPERTENSION: ICD-10-CM

## 2024-02-15 DIAGNOSIS — E11.29 MICROALBUMINURIA DUE TO TYPE 2 DIABETES MELLITUS: ICD-10-CM

## 2024-02-15 DIAGNOSIS — N18.31 STAGE 3A CHRONIC KIDNEY DISEASE (HCC): ICD-10-CM

## 2024-02-15 DIAGNOSIS — E11.40 TYPE 2 DIABETES MELLITUS WITH DIABETIC NEUROPATHY, WITH LONG-TERM CURRENT USE OF INSULIN (HCC): Primary | ICD-10-CM

## 2024-02-15 DIAGNOSIS — Z79.4 TYPE 2 DIABETES MELLITUS WITH DIABETIC NEUROPATHY, WITH LONG-TERM CURRENT USE OF INSULIN (HCC): Primary | ICD-10-CM

## 2024-02-15 DIAGNOSIS — I89.0 LYMPHEDEMA: ICD-10-CM

## 2024-02-15 PROCEDURE — 95251 CONT GLUC MNTR ANALYSIS I&R: CPT | Performed by: NURSE PRACTITIONER

## 2024-02-15 PROCEDURE — 99214 OFFICE O/P EST MOD 30 MIN: CPT | Performed by: NURSE PRACTITIONER

## 2024-02-15 NOTE — PROGRESS NOTES
Bianca Sena 68 y.o. female MRN: [id number]    Encounter: [id number]      Assessment/Plan     Assessment:  This is a 68 y.o.-year-old female with type 2 diabetes with neuropathy, hypertension and hyperlipidemia.      Plan:  1.  Type 2 diabetes insulin requiring:  Her most recent hemoglobin A1c is 7.0.  Reviewed her freestyle jerry at the time of the visit.  Is experiencing some hypoglycemia after lunch followed by hyperglycemia overnight after dinner.  For this I have asked her to increase Humalog to 6 units at breakfast and continue Humalog 4 units at lunch and increase to 6 units at dinner.  She will also continue her Levemir to 15 units.  I have asked her to send a record of her blood sugars to the office in 2 weeks for review so further changes can be made to help her blood sugars throughout the day.  Check hemoglobin A1c prior to next visit.     2.  Hypertension/microalbuminuria: She is normotensive in the office today.  Managed by Cardiology.  Continue Lisinopril and Lasix.  Check comprehensive metabolic panel prior to next visit.     3.  Hyperlipidemia:  Continue Omega 3 fatty acid fish oil. Check fasting lipid panel prior to next visit.    CC: Type 2 Diabetes follow up    History of Present Illness     HPI:  68 y.o. year old female with type 2 diabetes for approximately 18 years.  She is on oral agents and insulin at home and takes Metformin 1000 mg twice daily, Levemir insulin 15 units at bedtime, and Humalog insulin 8 unit at breakfast, 4 units at lunch and 6 at dinner. Her most recent hemoglobin A1c from February 9, 2024 is 7.0. She denies any polydipsia, and blurry vision.  She has no polyphagia.  She has 2-3 times per night nocturia and polyuria. She notes hypoglycemia after taking Humalog after sometimes over night. She has no chest pain or shortness of breath. She denies nephropathy, retinopathy, heart attack, stroke and claudication but does admit to neuropathy. Her  passed away in June  . She was diagnosed with COVID in 2023 and was treated with Paxlovid.     Downloaded her Freestyle Mae from  to 2024 reveals an average glucose of 150.  She is in target range 67% of the time, with 28% hyperglycemia with 5% low.       Her most recent diabetic eye exam was 2022 and no retinopathy. She complains of some numbness and tingling to her feet at times.  Her most recent diabetic foot exam was performed on 2021 at endocrine office visit.     Her hypertension is treated with Lasix 40 mg daily and lisinopril 5 mg daily.      For her hyperlipidemia, she is taking Omega 3 fatty acid fish oil 1000 mg daily.      Review of Systems   Constitutional: Negative.  Negative for chills, fatigue and fever.   HENT: Negative.  Negative for trouble swallowing and voice change.    Eyes: Negative.  Negative for photophobia, pain, discharge, redness, itching and visual disturbance.   Respiratory: Negative.  Negative for chest tightness and shortness of breath.    Cardiovascular:  Positive for leg swelling. Negative for chest pain.   Gastrointestinal: Negative.  Negative for abdominal pain, constipation, diarrhea and vomiting.   Endocrine: Positive for polyuria (up to twice per night). Negative for cold intolerance, heat intolerance, polydipsia and polyphagia.   Genitourinary: Negative.    Musculoskeletal:  Positive for arthralgias and myalgias.   Skin: Negative.    Allergic/Immunologic: Negative.    Neurological: Negative.  Negative for dizziness, syncope, light-headedness and headaches.   Hematological: Negative.    Psychiatric/Behavioral: Negative.     All other systems reviewed and are negative.      Historical Information   Past Medical History:   Diagnosis Date    Asthma     seasonal    Diabetes mellitus (HCC)     Osteoarthritis     Tremor, hereditary, benign      Past Surgical History:   Procedure Laterality Date     SECTION      3    REPLACEMENT TOTAL  KNEE Left 2021    TOE SURGERY Bilateral     5 th toe bone removed    TOTAL HIP ARTHROPLASTY Right     TUBAL LIGATION Bilateral      Social History   Social History     Substance and Sexual Activity   Alcohol Use Yes    Alcohol/week: 1.0 standard drink of alcohol    Types: 1 Standard drinks or equivalent per week    Comment: socially     Social History     Substance and Sexual Activity   Drug Use No     Social History     Tobacco Use   Smoking Status Former    Current packs/day: 0.00    Average packs/day: 1 pack/day for 15.1 years (15.1 ttl pk-yrs)    Types: Cigarettes    Start date: 5/10/1992    Quit date: 2007    Years since quittin.7   Smokeless Tobacco Never     Family History:   Family History   Problem Relation Age of Onset    Cirrhosis Mother     Heart disease Mother     Esophageal varices Mother     Brain cancer Father     Prostate cancer Father     Stroke Father     Heart attack Father     Hypertension Father             Skin cancer Brother     COPD Brother     Diabetes type II Maternal Aunt     Diabetes type II Maternal Grandfather             Parkinsonism Maternal Grandfather     Coronary artery disease Brother     Prostate cancer Brother     Post-traumatic stress disorder Son     No Known Problems Son     No Known Problems Daughter     Parkinsonism Maternal Aunt     Diabetes type II Maternal Aunt             Diabetes type I Cousin        Meds/Allergies   Current Outpatient Medications   Medication Sig Dispense Refill    albuterol (PROVENTIL HFA,VENTOLIN HFA) 90 mcg/act inhaler Inhale      BD ULTRA-FINE PEN NEEDLES 29G X 12.7MM MISC USE 4 TIMES DAILY 360 each 3    Calcium Carbonate (CALCIUM 600 PO) Take 1 tablet by mouth daily With vitamin d      Continuous Blood Gluc Sensor (FreeStyle Mae 14 Day Sensor) MISC Use 1 application every 14 (fourteen) days 2 each 6    Eliquis 5 MG Take 5 mg by mouth 2 (two) times a day      ferrous sulfate 325 (65 Fe) mg tablet Take 325  "mg by mouth 2 (two) times a day      insulin lispro (HumaLOG KwikPen) 100 units/mL injection pen INJECT SUBCUTANEOUSLY 2 TO  7 UNITS 4 TIMES DAILY AS  DIRECTED (Patient taking differently: INJECT 5 units breakfast, 10 units lunch, 2 units pm snack, 4 units dinner) 30 mL 4    Levemir FlexTouch 100 units/mL injection pen INJECT 18 TO 20 UNITS  SUBCUTANEOUSLY DAILY AT  BEDTIME (Patient taking differently: 10 units at night.) 30 mL 4    lisinopril (ZESTRIL) 10 mg tablet Take 10 mg by mouth daily      Magnesium 400 MG CAPS Take by mouth in the morning      metFORMIN (GLUCOPHAGE) 1000 MG tablet TAKE 1 TABLET BY MOUTH  TWICE DAILY WITH MEALS 180 tablet 3    metoprolol tartrate (LOPRESSOR) 25 mg tablet Take 25 mg by mouth 2 (two) times a day      Multiple Vitamins-Minerals (WOMENS 50+ MULTI VITAMIN/MIN PO) Take by mouth      Omega-3 Fatty Acids (FISH OIL) 1,000 mg Take 1,000 mg by mouth daily      rosuvastatin (CRESTOR) 10 MG tablet Take 10 mg by mouth daily      vitamin E, tocopherol, 400 units capsule Take 400 Units by mouth daily      docusate sodium (COLACE) 100 mg capsule Take 100 mg by mouth daily  (Patient not taking: Reported on 7/20/2023)      furosemide (LASIX) 40 mg tablet TAKE 1 TABLET BY MOUTH  DAILY AS NEEDED (Patient not taking: Reported on 7/20/2023) 90 tablet 3     No current facility-administered medications for this visit.     Allergies   Allergen Reactions    Actos [Pioglitazone] Edema     Severe lower extremity    Gentamicin     Other     Oxycodone GI Intolerance    Sulfa Antibiotics        Objective   Vitals: Blood pressure 114/70, pulse 65, height 5' 7\" (1.702 m), weight 103 kg (228 lb), SpO2 97%.    Physical Exam  Vitals reviewed.   Constitutional:       Appearance: She is well-developed. She is obese.   HENT:      Head: Normocephalic and atraumatic.   Eyes:      Conjunctiva/sclera: Conjunctivae normal.      Pupils: Pupils are equal, round, and reactive to light.   Cardiovascular:      Rate and " Rhythm: Normal rate and regular rhythm.      Heart sounds: Normal heart sounds.   Pulmonary:      Effort: Pulmonary effort is normal.      Breath sounds: Normal breath sounds.   Abdominal:      General: Bowel sounds are normal.      Palpations: Abdomen is soft.   Musculoskeletal:         General: Normal range of motion.      Cervical back: Normal range of motion and neck supple.      Right lower leg: Edema present.      Left lower leg: Edema present.   Skin:     General: Skin is warm and dry.   Neurological:      Mental Status: She is alert and oriented to person, place, and time.   Psychiatric:         Behavior: Behavior normal.         Thought Content: Thought content normal.         Judgment: Judgment normal.       Lab Results:   Lab Results   Component Value Date/Time    Hemoglobin A1C 7.0 (H) 02/09/2024 08:50 AM    Hemoglobin A1C 7.5 (H) 11/03/2023 09:54 AM    Hemoglobin A1C 6.8 (H) 07/10/2023 07:23 AM    WBC 5.94 02/09/2024 08:50 AM    WBC 6.05 11/03/2023 09:54 AM    WBC 6.16 07/10/2023 07:23 AM    Hemoglobin 11.2 (L) 02/09/2024 08:50 AM    Hemoglobin 11.0 (L) 11/03/2023 09:54 AM    Hemoglobin 11.7 07/10/2023 07:23 AM    Hematocrit 34.0 (L) 02/09/2024 08:50 AM    Hematocrit 34.3 (L) 11/03/2023 09:54 AM    Hematocrit 36.5 07/10/2023 07:23 AM    MCV 94 02/09/2024 08:50 AM    MCV 98 11/03/2023 09:54 AM    MCV 95 07/10/2023 07:23 AM    Platelets 267 02/09/2024 08:50 AM    Platelets 316 11/03/2023 09:54 AM    Platelets 309 07/10/2023 07:23 AM    BUN 17 02/09/2024 08:50 AM    BUN 14 11/03/2023 09:54 AM    BUN 20 07/10/2023 07:23 AM    Potassium 4.2 02/09/2024 08:50 AM    Potassium 4.4 11/03/2023 09:54 AM    Potassium 4.4 07/10/2023 07:23 AM    Chloride 102 02/09/2024 08:50 AM    Chloride 100 11/03/2023 09:54 AM    Chloride 107 07/10/2023 07:23 AM    CO2 31 02/09/2024 08:50 AM    CO2 29 11/03/2023 09:54 AM    CO2 31 07/10/2023 07:23 AM    Creatinine 0.99 02/09/2024 08:50 AM    Creatinine 1.02 11/03/2023 09:54 AM     "Creatinine 1.12 07/10/2023 07:23 AM    AST 16 02/09/2024 08:50 AM    AST 15 11/03/2023 09:54 AM    AST 19 07/10/2023 07:23 AM    ALT 11 02/09/2024 08:50 AM    ALT 10 11/03/2023 09:54 AM    ALT 18 07/10/2023 07:23 AM    Total Protein 6.4 02/09/2024 08:50 AM    Total Protein 6.4 11/03/2023 09:54 AM    Total Protein 6.8 07/10/2023 07:23 AM    Albumin 4.0 02/09/2024 08:50 AM    Albumin 3.7 11/03/2023 09:54 AM    Albumin 3.6 07/10/2023 07:23 AM    HDL, Direct 60 11/03/2023 09:54 AM    HDL, Direct 70 03/20/2023 10:17 AM    Triglycerides 48 11/03/2023 09:54 AM    Triglycerides 67 03/20/2023 10:17 AM     Portions of the record may have been created with voice recognition software. Occasional wrong word or \"sound a like\" substitutions may have occurred due to the inherent limitations of voice recognition software. Read the chart carefully and recognize, using context, where substitutions have occurred.    "

## 2024-02-15 NOTE — PATIENT INSTRUCTIONS
Be mindful of diet.      Stay active and stay hydrated.      For now, continue current regimen with Metformin.     Take your Humalog 15 MINUTES BEFORE MEALS !!!!!!!!!!!!     Decrease Humalog to 6 units at breakfast.     Continue Humalog 4 units at lunch and continue with 2 units at mid-afternoon snack.     Continue Humalog to 6 units at dinner.      Continue Levemir 15 units.     Please check your blood sugars 3-4 times daily and forward a record to the office in 1-2 weeks for review and further adjustment, as needed.      Continue lasix and lisinopril for treatment of your blood pressure.     Continue Omega 3 fatty acid fish oil daily.      Obtain lab work as prescribed.

## 2024-03-12 DIAGNOSIS — Z79.4 TYPE 2 DIABETES MELLITUS WITH DIABETIC NEUROPATHY, WITH LONG-TERM CURRENT USE OF INSULIN (HCC): ICD-10-CM

## 2024-03-12 DIAGNOSIS — E11.40 TYPE 2 DIABETES MELLITUS WITH DIABETIC NEUROPATHY, WITH LONG-TERM CURRENT USE OF INSULIN (HCC): ICD-10-CM

## 2024-03-12 RX ORDER — PEN NEEDLE, DIABETIC 29 G X1/2"
NEEDLE, DISPOSABLE MISCELLANEOUS
Qty: 360 EACH | Refills: 3 | Status: SHIPPED | OUTPATIENT
Start: 2024-03-12

## 2024-04-22 DIAGNOSIS — E11.40 TYPE 2 DIABETES MELLITUS WITH DIABETIC NEUROPATHY, WITH LONG-TERM CURRENT USE OF INSULIN (HCC): ICD-10-CM

## 2024-04-22 DIAGNOSIS — Z79.4 TYPE 2 DIABETES MELLITUS WITH DIABETIC NEUROPATHY, WITH LONG-TERM CURRENT USE OF INSULIN (HCC): ICD-10-CM

## 2024-04-22 RX ORDER — INSULIN DETEMIR 100 [IU]/ML
INJECTION, SOLUTION SUBCUTANEOUS
Qty: 30 ML | Refills: 4 | Status: SHIPPED | OUTPATIENT
Start: 2024-04-22

## 2024-05-06 ENCOUNTER — LAB (OUTPATIENT)
Dept: LAB | Facility: CLINIC | Age: 69
End: 2024-05-06
Payer: MEDICARE

## 2024-05-06 DIAGNOSIS — E11.29 MICROALBUMINURIA DUE TO TYPE 2 DIABETES MELLITUS  (HCC): ICD-10-CM

## 2024-05-06 DIAGNOSIS — I10 ESSENTIAL HYPERTENSION: ICD-10-CM

## 2024-05-06 DIAGNOSIS — E78.2 MIXED HYPERLIPIDEMIA: ICD-10-CM

## 2024-05-06 DIAGNOSIS — N18.31 STAGE 3A CHRONIC KIDNEY DISEASE (HCC): ICD-10-CM

## 2024-05-06 DIAGNOSIS — R80.9 MICROALBUMINURIA DUE TO TYPE 2 DIABETES MELLITUS  (HCC): ICD-10-CM

## 2024-05-06 DIAGNOSIS — Z79.4 TYPE 2 DIABETES MELLITUS WITH DIABETIC NEUROPATHY, WITH LONG-TERM CURRENT USE OF INSULIN (HCC): ICD-10-CM

## 2024-05-06 DIAGNOSIS — E11.40 TYPE 2 DIABETES MELLITUS WITH DIABETIC NEUROPATHY, WITH LONG-TERM CURRENT USE OF INSULIN (HCC): ICD-10-CM

## 2024-05-06 DIAGNOSIS — E66.01 OBESITY, MORBID (HCC): ICD-10-CM

## 2024-05-06 DIAGNOSIS — I89.0 LYMPHEDEMA: ICD-10-CM

## 2024-05-06 LAB
ALBUMIN SERPL BCP-MCNC: 4.1 G/DL (ref 3.5–5)
ALP SERPL-CCNC: 46 U/L (ref 34–104)
ALT SERPL W P-5'-P-CCNC: 15 U/L (ref 7–52)
ANION GAP SERPL CALCULATED.3IONS-SCNC: 8 MMOL/L (ref 4–13)
AST SERPL W P-5'-P-CCNC: 20 U/L (ref 13–39)
BASOPHILS # BLD AUTO: 0.04 THOUSANDS/ÂΜL (ref 0–0.1)
BASOPHILS NFR BLD AUTO: 1 % (ref 0–1)
BILIRUB SERPL-MCNC: 0.5 MG/DL (ref 0.2–1)
BUN SERPL-MCNC: 15 MG/DL (ref 5–25)
CALCIUM SERPL-MCNC: 9.4 MG/DL (ref 8.4–10.2)
CHLORIDE SERPL-SCNC: 103 MMOL/L (ref 96–108)
CHOLEST SERPL-MCNC: 133 MG/DL
CO2 SERPL-SCNC: 31 MMOL/L (ref 21–32)
CREAT SERPL-MCNC: 1.02 MG/DL (ref 0.6–1.3)
EOSINOPHIL # BLD AUTO: 0.35 THOUSAND/ÂΜL (ref 0–0.61)
EOSINOPHIL NFR BLD AUTO: 6 % (ref 0–6)
ERYTHROCYTE [DISTWIDTH] IN BLOOD BY AUTOMATED COUNT: 11.9 % (ref 11.6–15.1)
GFR SERPL CREATININE-BSD FRML MDRD: 55 ML/MIN/1.73SQ M
GLUCOSE P FAST SERPL-MCNC: 82 MG/DL (ref 65–99)
HCT VFR BLD AUTO: 34.8 % (ref 34.8–46.1)
HDLC SERPL-MCNC: 62 MG/DL
HGB BLD-MCNC: 11.4 G/DL (ref 11.5–15.4)
IMM GRANULOCYTES # BLD AUTO: 0.01 THOUSAND/UL (ref 0–0.2)
IMM GRANULOCYTES NFR BLD AUTO: 0 % (ref 0–2)
LDLC SERPL CALC-MCNC: 60 MG/DL (ref 0–100)
LYMPHOCYTES # BLD AUTO: 2.26 THOUSANDS/ÂΜL (ref 0.6–4.47)
LYMPHOCYTES NFR BLD AUTO: 39 % (ref 14–44)
MCH RBC QN AUTO: 31 PG (ref 26.8–34.3)
MCHC RBC AUTO-ENTMCNC: 32.8 G/DL (ref 31.4–37.4)
MCV RBC AUTO: 95 FL (ref 82–98)
MONOCYTES # BLD AUTO: 0.53 THOUSAND/ÂΜL (ref 0.17–1.22)
MONOCYTES NFR BLD AUTO: 9 % (ref 4–12)
NEUTROPHILS # BLD AUTO: 2.62 THOUSANDS/ÂΜL (ref 1.85–7.62)
NEUTS SEG NFR BLD AUTO: 45 % (ref 43–75)
NONHDLC SERPL-MCNC: 71 MG/DL
NRBC BLD AUTO-RTO: 0 /100 WBCS
PLATELET # BLD AUTO: 279 THOUSANDS/UL (ref 149–390)
PMV BLD AUTO: 9.6 FL (ref 8.9–12.7)
POTASSIUM SERPL-SCNC: 4.6 MMOL/L (ref 3.5–5.3)
PROT SERPL-MCNC: 6.6 G/DL (ref 6.4–8.4)
RBC # BLD AUTO: 3.68 MILLION/UL (ref 3.81–5.12)
SODIUM SERPL-SCNC: 142 MMOL/L (ref 135–147)
TRIGL SERPL-MCNC: 54 MG/DL
TSH SERPL DL<=0.05 MIU/L-ACNC: 2.67 UIU/ML (ref 0.45–4.5)
WBC # BLD AUTO: 5.81 THOUSAND/UL (ref 4.31–10.16)

## 2024-05-06 PROCEDURE — 83036 HEMOGLOBIN GLYCOSYLATED A1C: CPT

## 2024-05-06 PROCEDURE — 80061 LIPID PANEL: CPT

## 2024-05-06 PROCEDURE — 85025 COMPLETE CBC W/AUTO DIFF WBC: CPT

## 2024-05-06 PROCEDURE — 80053 COMPREHEN METABOLIC PANEL: CPT

## 2024-05-06 PROCEDURE — 36415 COLL VENOUS BLD VENIPUNCTURE: CPT

## 2024-05-06 PROCEDURE — 84443 ASSAY THYROID STIM HORMONE: CPT

## 2024-05-07 LAB
EST. AVERAGE GLUCOSE BLD GHB EST-MCNC: 143 MG/DL
HBA1C MFR BLD: 6.6 %

## 2024-05-08 ENCOUNTER — DOCUMENTATION (OUTPATIENT)
Dept: ENDOCRINOLOGY | Facility: HOSPITAL | Age: 69
End: 2024-05-08

## 2024-05-22 ENCOUNTER — OFFICE VISIT (OUTPATIENT)
Dept: ENDOCRINOLOGY | Facility: HOSPITAL | Age: 69
End: 2024-05-22
Payer: MEDICARE

## 2024-05-22 VITALS
HEIGHT: 67 IN | HEART RATE: 64 BPM | SYSTOLIC BLOOD PRESSURE: 120 MMHG | WEIGHT: 228.4 LBS | DIASTOLIC BLOOD PRESSURE: 80 MMHG | BODY MASS INDEX: 35.85 KG/M2

## 2024-05-22 DIAGNOSIS — R80.9 MICROALBUMINURIA DUE TO TYPE 2 DIABETES MELLITUS  (HCC): ICD-10-CM

## 2024-05-22 DIAGNOSIS — Z79.4 TYPE 2 DIABETES MELLITUS WITH DIABETIC NEUROPATHY, WITH LONG-TERM CURRENT USE OF INSULIN (HCC): Primary | ICD-10-CM

## 2024-05-22 DIAGNOSIS — E78.2 MIXED HYPERLIPIDEMIA: ICD-10-CM

## 2024-05-22 DIAGNOSIS — R80.9 MICROALBUMINURIA: ICD-10-CM

## 2024-05-22 DIAGNOSIS — E11.29 MICROALBUMINURIA DUE TO TYPE 2 DIABETES MELLITUS  (HCC): ICD-10-CM

## 2024-05-22 DIAGNOSIS — I10 ESSENTIAL HYPERTENSION: ICD-10-CM

## 2024-05-22 DIAGNOSIS — E11.40 TYPE 2 DIABETES MELLITUS WITH DIABETIC NEUROPATHY, WITH LONG-TERM CURRENT USE OF INSULIN (HCC): Primary | ICD-10-CM

## 2024-05-22 DIAGNOSIS — E66.01 OBESITY, MORBID (HCC): ICD-10-CM

## 2024-05-22 LAB — SL AMB POCT GLUCOSE BLD: 124

## 2024-05-22 PROCEDURE — 82948 REAGENT STRIP/BLOOD GLUCOSE: CPT | Performed by: NURSE PRACTITIONER

## 2024-05-22 PROCEDURE — 95251 CONT GLUC MNTR ANALYSIS I&R: CPT | Performed by: NURSE PRACTITIONER

## 2024-05-22 PROCEDURE — 99214 OFFICE O/P EST MOD 30 MIN: CPT | Performed by: NURSE PRACTITIONER

## 2024-05-22 NOTE — PATIENT INSTRUCTIONS
Be mindful of diet.      Stay active and stay hydrated.      For now, continue current regimen with Metformin.     Decrease Humalog to 5 units at breakfast.     Continue Humalog 4 units at lunch and continue with 2 units at mid-afternoon snack.     Continue Humalog to 4 units at dinner.      Continue Levemir 10 units.     Please download the Freestyle Mae 3 annmarie on your new phone and upgrade to the Freestyle Mae 3.     Continue lasix and lisinopril for treatment of your blood pressure.     Continue Omega 3 fatty acid fish oil daily.      Obtain lab work as prescribed.

## 2024-05-22 NOTE — PROGRESS NOTES
Bianca Sena 68 y.o. female MRN: [id number]    Encounter: [id number]      Assessment & Plan     Assessment:  This is a 68 y.o.-year-old female with type 2 diabetes with neuropathy, hypertension and hyperlipidemia.       Plan:  1.  Type 2 diabetes insulin requiring:  Her most recent hemoglobin A1c is 6.6.  Reviewed her freestyle jerry at the time of the visit.  She is experiencing some hypoglycemia after her meals.  For this I have asked her to decrease her Humalog to 5 units at breakfast, 4 units at lunch and 4 units with dinner.  She will also continue her Levemir at 10 units.  I have asked her to send a record of her blood sugars to the office in 2 weeks for review so further changes can be made to help her blood sugars throughout the day.  She did experience a low blood sugar in the office at 54 today.  She was treated with rapid acting carbohydrates and juice.  Upon leaving the office her fingerstick was 124.  Overall, she felt well the entire time.  Check hemoglobin A1c prior to next visit.     2.  Hypertension/microalbuminuria: She is normotensive in the office today.  Managed by Cardiology.  Continue Lisinopril and Lasix.  Check comprehensive metabolic panel prior to next visit.     3.  Hyperlipidemia: Stable.  Continue Omega 3 fatty acid fish oil.     CC: Type 2 Diabetes follow up    History of Present Illness     HPI:  68 y.o. year old female with type 2 diabetes for approximately 20 years.  She is on oral agents and insulin at home and takes Metformin 1000 mg twice daily, Levemir insulin 10 units at bedtime, and Humalog insulin 6 unit at breakfast, 4 units at lunch and 5 at dinner. Her most recent hemoglobin A1c from May 6, 2024 is 6.6. She denies any polydipsia, and blurry vision.  She has no polyphagia.  She has 2-3 times per night nocturia and polyuria. She notes hypoglycemia after taking Humalog after sometimes over night. She has no chest pain or shortness of breath. She denies nephropathy,  retinopathy, heart attack, stroke and claudication but does admit to neuropathy. Her  passed away in June 2020. She was diagnosed with COVID in October 2023 and was treated with Paxlovid.     Downloaded her Freestyle Mae from May 9 through May 22, 2024 reveals an average glucose of 144.  She is in target range 68% of the time, with 25% hyperglycemia with 7% low.       Her most recent diabetic eye exam was December 13, 2022 and no retinopathy. She complains of some numbness and tingling to her feet at times.  Her most recent diabetic foot exam was performed on November 24, 2021 at endocrine office visit.     Her hypertension is treated with Lasix 40 mg daily and lisinopril 5 mg daily.      For her hyperlipidemia, she is taking Omega 3 fatty acid fish oil 1000 mg daily.      Review of Systems   Constitutional: Negative.  Negative for chills, fatigue and fever.   HENT: Negative.  Negative for trouble swallowing and voice change.    Eyes: Negative.  Negative for photophobia, pain, discharge, redness, itching and visual disturbance.   Respiratory: Negative.  Negative for chest tightness and shortness of breath.    Cardiovascular:  Positive for leg swelling. Negative for chest pain and palpitations.   Gastrointestinal: Negative.  Negative for abdominal pain, constipation, diarrhea and vomiting.   Endocrine: Positive for polyuria (1-2 times per night). Negative for cold intolerance, heat intolerance, polydipsia and polyphagia.   Genitourinary: Negative.    Musculoskeletal:  Positive for arthralgias and myalgias.   Skin: Negative.    Allergic/Immunologic: Negative.    Neurological: Negative.  Negative for dizziness, syncope, light-headedness and headaches.   Hematological: Negative.    Psychiatric/Behavioral: Negative.     All other systems reviewed and are negative.      Historical Information   Past Medical History:   Diagnosis Date    Asthma     seasonal    Diabetes mellitus (HCC)     Osteoarthritis     Tremor,  hereditary, benign      Past Surgical History:   Procedure Laterality Date     SECTION      3    REPLACEMENT TOTAL KNEE Left 2021    TOE SURGERY Bilateral     5 th toe bone removed    TOTAL HIP ARTHROPLASTY Right     TUBAL LIGATION Bilateral      Social History   Social History     Substance and Sexual Activity   Alcohol Use Yes    Alcohol/week: 1.0 standard drink of alcohol    Types: 1 Standard drinks or equivalent per week    Comment: socially     Social History     Substance and Sexual Activity   Drug Use No     Social History     Tobacco Use   Smoking Status Former    Current packs/day: 0.00    Average packs/day: 1 pack/day for 15.1 years (15.1 ttl pk-yrs)    Types: Cigarettes    Start date: 5/10/1992    Quit date: 2007    Years since quittin.9   Smokeless Tobacco Never     Family History:   Family History   Problem Relation Age of Onset    Cirrhosis Mother     Heart disease Mother     Esophageal varices Mother     Brain cancer Father     Prostate cancer Father     Stroke Father     Heart attack Father     Hypertension Father             Skin cancer Brother     COPD Brother     Diabetes type II Maternal Aunt     Diabetes type II Maternal Grandfather             Parkinsonism Maternal Grandfather     Coronary artery disease Brother     Prostate cancer Brother     Post-traumatic stress disorder Son     No Known Problems Son     No Known Problems Daughter     Parkinsonism Maternal Aunt     Diabetes type II Maternal Aunt             Diabetes type I Cousin        Meds/Allergies   Current Outpatient Medications   Medication Sig Dispense Refill    albuterol (PROVENTIL HFA,VENTOLIN HFA) 90 mcg/act inhaler Inhale      BD ULTRA-FINE PEN NEEDLES 29G X 12.7MM MISC USE 4 TIMES DAILY 360 each 3    Calcium Carbonate (CALCIUM 600 PO) Take 1 tablet by mouth daily With vitamin d      Continuous Blood Gluc Sensor (FreeStyle Mae 14 Day Sensor) MISC Use 1 application every 14  "(fourteen) days 2 each 6    Eliquis 5 MG Take 5 mg by mouth 2 (two) times a day      ferrous sulfate 325 (65 Fe) mg tablet Take 325 mg by mouth 2 (two) times a day      insulin detemir (Levemir FlexPen) 100 Units/mL injection pen INJECT SUBCUTANEOUSLY 18 TO 20  UNITS DAILY AT BEDTIME (Patient taking differently: 15 units at HS.) 30 mL 4    insulin lispro (HumaLOG KwikPen) 100 units/mL injection pen INJECT SUBCUTANEOUSLY 2 TO  7 UNITS 4 TIMES DAILY AS  DIRECTED (Patient taking differently: INJECT 8 units breakfast, 6 units lunch, 2 units pm snack, 5 units dinner) 30 mL 4    lisinopril (ZESTRIL) 10 mg tablet Take 10 mg by mouth daily      Magnesium 400 MG CAPS Take by mouth in the morning      metFORMIN (GLUCOPHAGE) 1000 MG tablet TAKE 1 TABLET BY MOUTH TWICE  DAILY WITH MEALS 180 tablet 1    metoprolol tartrate (LOPRESSOR) 25 mg tablet Take 25 mg by mouth 2 (two) times a day      Multiple Vitamins-Minerals (WOMENS 50+ MULTI VITAMIN/MIN PO) Take by mouth      Omega-3 Fatty Acids (FISH OIL) 1,000 mg Take 1,000 mg by mouth daily      rosuvastatin (CRESTOR) 10 MG tablet Take 10 mg by mouth daily      vitamin E, tocopherol, 400 units capsule Take 400 Units by mouth daily      docusate sodium (COLACE) 100 mg capsule Take 100 mg by mouth daily  (Patient not taking: Reported on 7/20/2023)      furosemide (LASIX) 40 mg tablet TAKE 1 TABLET BY MOUTH  DAILY AS NEEDED (Patient not taking: Reported on 7/20/2023) 90 tablet 3     No current facility-administered medications for this visit.     Allergies   Allergen Reactions    Actos [Pioglitazone] Edema     Severe lower extremity    Gentamicin     Other     Oxycodone GI Intolerance    Sulfa Antibiotics        Objective   Vitals: Blood pressure 120/80, pulse 64, height 5' 7\" (1.702 m), weight 104 kg (228 lb 6.4 oz).    Physical Exam  Vitals reviewed.   Constitutional:       Appearance: She is well-developed. She is obese.   HENT:      Head: Normocephalic and atraumatic.   Eyes:      " Conjunctiva/sclera: Conjunctivae normal.      Pupils: Pupils are equal, round, and reactive to light.   Cardiovascular:      Rate and Rhythm: Normal rate and regular rhythm.      Heart sounds: Normal heart sounds.   Pulmonary:      Effort: Pulmonary effort is normal.      Breath sounds: Normal breath sounds.   Abdominal:      General: Bowel sounds are normal.      Palpations: Abdomen is soft.   Musculoskeletal:         General: Normal range of motion.      Cervical back: Normal range of motion and neck supple.      Right lower leg: Edema present.      Left lower leg: Edema present.   Skin:     General: Skin is warm and dry.   Neurological:      Mental Status: She is alert and oriented to person, place, and time.   Psychiatric:         Behavior: Behavior normal.         Thought Content: Thought content normal.         Judgment: Judgment normal.         Lab Results:   Lab Results   Component Value Date/Time    Hemoglobin A1C 6.6 (H) 05/06/2024 08:18 AM    Hemoglobin A1C 7.0 (H) 02/09/2024 08:50 AM    Hemoglobin A1C 7.5 (H) 11/03/2023 09:54 AM    WBC 5.81 05/06/2024 08:18 AM    WBC 5.94 02/09/2024 08:50 AM    WBC 6.05 11/03/2023 09:54 AM    Hemoglobin 11.4 (L) 05/06/2024 08:18 AM    Hemoglobin 11.2 (L) 02/09/2024 08:50 AM    Hemoglobin 11.0 (L) 11/03/2023 09:54 AM    Hematocrit 34.8 05/06/2024 08:18 AM    Hematocrit 34.0 (L) 02/09/2024 08:50 AM    Hematocrit 34.3 (L) 11/03/2023 09:54 AM    MCV 95 05/06/2024 08:18 AM    MCV 94 02/09/2024 08:50 AM    MCV 98 11/03/2023 09:54 AM    Platelets 279 05/06/2024 08:18 AM    Platelets 267 02/09/2024 08:50 AM    Platelets 316 11/03/2023 09:54 AM    BUN 15 05/06/2024 08:18 AM    BUN 17 02/09/2024 08:50 AM    BUN 14 11/03/2023 09:54 AM    Potassium 4.6 05/06/2024 08:18 AM    Potassium 4.2 02/09/2024 08:50 AM    Potassium 4.4 11/03/2023 09:54 AM    Chloride 103 05/06/2024 08:18 AM    Chloride 102 02/09/2024 08:50 AM    Chloride 100 11/03/2023 09:54 AM    CO2 31 05/06/2024 08:18 AM     "CO2 31 02/09/2024 08:50 AM    CO2 29 11/03/2023 09:54 AM    Creatinine 1.02 05/06/2024 08:18 AM    Creatinine 0.99 02/09/2024 08:50 AM    Creatinine 1.02 11/03/2023 09:54 AM    AST 20 05/06/2024 08:18 AM    AST 16 02/09/2024 08:50 AM    AST 15 11/03/2023 09:54 AM    ALT 15 05/06/2024 08:18 AM    ALT 11 02/09/2024 08:50 AM    ALT 10 11/03/2023 09:54 AM    Total Protein 6.6 05/06/2024 08:18 AM    Total Protein 6.4 02/09/2024 08:50 AM    Total Protein 6.4 11/03/2023 09:54 AM    Albumin 4.1 05/06/2024 08:18 AM    Albumin 4.0 02/09/2024 08:50 AM    Albumin 3.7 11/03/2023 09:54 AM    HDL, Direct 62 05/06/2024 08:18 AM    HDL, Direct 60 11/03/2023 09:54 AM    Triglycerides 54 05/06/2024 08:18 AM    Triglycerides 48 11/03/2023 09:54 AM     Portions of the record may have been created with voice recognition software. Occasional wrong word or \"sound a like\" substitutions may have occurred due to the inherent limitations of voice recognition software. Read the chart carefully and recognize, using context, where substitutions have occurred.    "

## 2024-05-23 LAB
DME PARACHUTE DELIVERY DATE REQUESTED: NORMAL
DME PARACHUTE ITEM DESCRIPTION: NORMAL
DME PARACHUTE ORDER STATUS: NORMAL
DME PARACHUTE SUPPLIER NAME: NORMAL
DME PARACHUTE SUPPLIER PHONE: NORMAL

## 2024-07-01 DIAGNOSIS — E11.40 TYPE 2 DIABETES MELLITUS WITH DIABETIC NEUROPATHY, WITH LONG-TERM CURRENT USE OF INSULIN (HCC): ICD-10-CM

## 2024-07-01 DIAGNOSIS — Z79.4 TYPE 2 DIABETES MELLITUS WITH DIABETIC NEUROPATHY, WITH LONG-TERM CURRENT USE OF INSULIN (HCC): ICD-10-CM

## 2024-07-01 RX ORDER — INSULIN LISPRO 100 [IU]/ML
INJECTION, SOLUTION INTRAVENOUS; SUBCUTANEOUS
Qty: 30 ML | Refills: 1 | Status: SHIPPED | OUTPATIENT
Start: 2024-07-01

## 2024-08-15 ENCOUNTER — APPOINTMENT (OUTPATIENT)
Dept: LAB | Facility: CLINIC | Age: 69
End: 2024-08-15
Payer: MEDICARE

## 2024-08-15 DIAGNOSIS — E11.40 TYPE 2 DIABETES MELLITUS WITH DIABETIC NEUROPATHY, WITH LONG-TERM CURRENT USE OF INSULIN (HCC): ICD-10-CM

## 2024-08-15 DIAGNOSIS — Z79.4 TYPE 2 DIABETES MELLITUS WITH DIABETIC NEUROPATHY, WITH LONG-TERM CURRENT USE OF INSULIN (HCC): ICD-10-CM

## 2024-08-15 DIAGNOSIS — E11.29 MICROALBUMINURIA DUE TO TYPE 2 DIABETES MELLITUS  (HCC): ICD-10-CM

## 2024-08-15 DIAGNOSIS — R80.9 MICROALBUMINURIA: ICD-10-CM

## 2024-08-15 DIAGNOSIS — E78.2 MIXED HYPERLIPIDEMIA: ICD-10-CM

## 2024-08-15 DIAGNOSIS — R80.9 MICROALBUMINURIA DUE TO TYPE 2 DIABETES MELLITUS  (HCC): ICD-10-CM

## 2024-08-15 DIAGNOSIS — E66.01 OBESITY, MORBID (HCC): ICD-10-CM

## 2024-08-15 DIAGNOSIS — I10 ESSENTIAL HYPERTENSION: ICD-10-CM

## 2024-08-15 LAB
ALBUMIN SERPL BCG-MCNC: 3.9 G/DL (ref 3.5–5)
ALP SERPL-CCNC: 48 U/L (ref 34–104)
ALT SERPL W P-5'-P-CCNC: 12 U/L (ref 7–52)
ANION GAP SERPL CALCULATED.3IONS-SCNC: 8 MMOL/L (ref 4–13)
AST SERPL W P-5'-P-CCNC: 19 U/L (ref 13–39)
BASOPHILS # BLD AUTO: 0.03 THOUSANDS/ÂΜL (ref 0–0.1)
BASOPHILS NFR BLD AUTO: 1 % (ref 0–1)
BILIRUB SERPL-MCNC: 0.52 MG/DL (ref 0.2–1)
BUN SERPL-MCNC: 16 MG/DL (ref 5–25)
CALCIUM SERPL-MCNC: 9.5 MG/DL (ref 8.4–10.2)
CHLORIDE SERPL-SCNC: 101 MMOL/L (ref 96–108)
CO2 SERPL-SCNC: 28 MMOL/L (ref 21–32)
CREAT SERPL-MCNC: 1.03 MG/DL (ref 0.6–1.3)
EOSINOPHIL # BLD AUTO: 0.15 THOUSAND/ÂΜL (ref 0–0.61)
EOSINOPHIL NFR BLD AUTO: 3 % (ref 0–6)
ERYTHROCYTE [DISTWIDTH] IN BLOOD BY AUTOMATED COUNT: 12.4 % (ref 11.6–15.1)
EST. AVERAGE GLUCOSE BLD GHB EST-MCNC: 174 MG/DL
GFR SERPL CREATININE-BSD FRML MDRD: 55 ML/MIN/1.73SQ M
GLUCOSE P FAST SERPL-MCNC: 82 MG/DL (ref 65–99)
HBA1C MFR BLD: 7.7 %
HCT VFR BLD AUTO: 35.3 % (ref 34.8–46.1)
HGB BLD-MCNC: 11.1 G/DL (ref 11.5–15.4)
IMM GRANULOCYTES # BLD AUTO: 0 THOUSAND/UL (ref 0–0.2)
IMM GRANULOCYTES NFR BLD AUTO: 0 % (ref 0–2)
LYMPHOCYTES # BLD AUTO: 2.35 THOUSANDS/ÂΜL (ref 0.6–4.47)
LYMPHOCYTES NFR BLD AUTO: 42 % (ref 14–44)
MCH RBC QN AUTO: 30.7 PG (ref 26.8–34.3)
MCHC RBC AUTO-ENTMCNC: 31.4 G/DL (ref 31.4–37.4)
MCV RBC AUTO: 98 FL (ref 82–98)
MONOCYTES # BLD AUTO: 0.57 THOUSAND/ÂΜL (ref 0.17–1.22)
MONOCYTES NFR BLD AUTO: 10 % (ref 4–12)
NEUTROPHILS # BLD AUTO: 2.46 THOUSANDS/ÂΜL (ref 1.85–7.62)
NEUTS SEG NFR BLD AUTO: 44 % (ref 43–75)
NRBC BLD AUTO-RTO: 0 /100 WBCS
PLATELET # BLD AUTO: 257 THOUSANDS/UL (ref 149–390)
PMV BLD AUTO: 10.4 FL (ref 8.9–12.7)
POTASSIUM SERPL-SCNC: 4.7 MMOL/L (ref 3.5–5.3)
PROT SERPL-MCNC: 6.4 G/DL (ref 6.4–8.4)
RBC # BLD AUTO: 3.62 MILLION/UL (ref 3.81–5.12)
SODIUM SERPL-SCNC: 137 MMOL/L (ref 135–147)
WBC # BLD AUTO: 5.56 THOUSAND/UL (ref 4.31–10.16)

## 2024-08-15 PROCEDURE — 85025 COMPLETE CBC W/AUTO DIFF WBC: CPT

## 2024-08-15 PROCEDURE — 36415 COLL VENOUS BLD VENIPUNCTURE: CPT

## 2024-08-15 PROCEDURE — 80053 COMPREHEN METABOLIC PANEL: CPT

## 2024-08-15 PROCEDURE — 83036 HEMOGLOBIN GLYCOSYLATED A1C: CPT

## 2024-08-27 ENCOUNTER — OFFICE VISIT (OUTPATIENT)
Dept: ENDOCRINOLOGY | Facility: HOSPITAL | Age: 69
End: 2024-08-27
Payer: MEDICARE

## 2024-08-27 VITALS
SYSTOLIC BLOOD PRESSURE: 124 MMHG | DIASTOLIC BLOOD PRESSURE: 80 MMHG | BODY MASS INDEX: 35.31 KG/M2 | HEART RATE: 62 BPM | HEIGHT: 67 IN | WEIGHT: 225 LBS

## 2024-08-27 DIAGNOSIS — E11.40 TYPE 2 DIABETES MELLITUS WITH DIABETIC NEUROPATHY, WITH LONG-TERM CURRENT USE OF INSULIN (HCC): Primary | ICD-10-CM

## 2024-08-27 DIAGNOSIS — I10 ESSENTIAL HYPERTENSION: ICD-10-CM

## 2024-08-27 DIAGNOSIS — R80.9 MICROALBUMINURIA DUE TO TYPE 2 DIABETES MELLITUS  (HCC): ICD-10-CM

## 2024-08-27 DIAGNOSIS — E11.29 MICROALBUMINURIA DUE TO TYPE 2 DIABETES MELLITUS  (HCC): ICD-10-CM

## 2024-08-27 DIAGNOSIS — E78.2 MIXED HYPERLIPIDEMIA: ICD-10-CM

## 2024-08-27 DIAGNOSIS — E66.01 OBESITY, MORBID (HCC): ICD-10-CM

## 2024-08-27 DIAGNOSIS — Z79.4 TYPE 2 DIABETES MELLITUS WITH DIABETIC NEUROPATHY, WITH LONG-TERM CURRENT USE OF INSULIN (HCC): Primary | ICD-10-CM

## 2024-08-27 PROCEDURE — 95251 CONT GLUC MNTR ANALYSIS I&R: CPT | Performed by: NURSE PRACTITIONER

## 2024-08-27 PROCEDURE — 99214 OFFICE O/P EST MOD 30 MIN: CPT | Performed by: NURSE PRACTITIONER

## 2024-08-27 NOTE — PROGRESS NOTES
Bianca Sena 68 y.o. female MRN: [id number]    Encounter: [id number]      Assessment & Plan     Assessment:  This is a 68 y.o.-year-old female with type 2 diabetes with neuropathy, hypertension and hyperlipidemia.       Plan:  1.  Type 2 diabetes insulin requiring:  Her most recent hemoglobin A1c is 7.7.  Reviewed her freestyle jerry at the time of the visit.  She is experiencing some hypoglycemia after her meals.  For this I have asked her to decrease her Humalog to 4 units at breakfast, 2 units at lunch and 2 units with dinner.  She will also continue her Levemir at 10 units.  I have asked her to send a record of her blood sugars to the office in 2 weeks for review so further changes can be made to help her blood sugars throughout the day..  She was treated with rapid acting carbohydrates and juice. Overall, she felt well the entire time.  Check hemoglobin A1c prior to next visit.     2.  Hypertension/microalbuminuria: She is normotensive in the office today.  Managed by Cardiology.  Continue Lisinopril and Lasix.  Check comprehensive metabolic panel prior to next visit.     3.  Hyperlipidemia: Continue Omega 3 fatty acid fish oil.  Check fasting lipid panel prior to next visit.       CC: Type 2 Diabetes follow up    History of Present Illness     HPI:  68 y.o. year old female with type 2 diabetes for approximately 20 years.  She is on oral agents and insulin at home and takes Metformin 1000 mg twice daily, Levemir insulin 10 units at bedtime, and Humalog insulin 5 unit at breakfast, 4 units at lunch and 4 at dinner. Her most recent hemoglobin A1c from August 15, 2024 is 7.7. She denies any polydipsia, and blurry vision.  She has no polyphagia.  She has 2-3 times per night nocturia and polyuria. She notes hypoglycemia after taking Humalog after sometimes over night. She has no chest pain or shortness of breath. She denies nephropathy, retinopathy, heart attack, stroke and claudication but does admit to  neuropathy. Her  passed away in June 2020. She was diagnosed with COVID in October 2023 and was treated with Paxlovid.     Downloaded her Freestyle Mae from August 14 through August 24, 2024 reveals an average glucose of 162.  She is in target range 64% of the time, with 24% hyperglycemia with 2% low readings.  Hypoglycemia is typically after meals recently.  Appetite has decreased recently.       Her most recent diabetic eye exam was December 13, 2022 and no retinopathy. She complains of some numbness and tingling to her feet at times.  Her most recent diabetic foot exam was performed on November 9, 2023 at endocrine office visit.     Her hypertension is treated with Lasix 40 mg daily and lisinopril 5 mg daily.      For her hyperlipidemia, she is taking Omega 3 fatty acid fish oil 1000 mg daily.     Review of Systems   Constitutional: Negative.  Negative for chills, fatigue and fever.   HENT: Negative.  Negative for trouble swallowing and voice change.    Eyes: Negative.  Negative for photophobia, pain, discharge, redness, itching and visual disturbance.   Respiratory: Negative.  Negative for chest tightness and shortness of breath.    Cardiovascular:  Positive for leg swelling. Negative for chest pain and palpitations.   Gastrointestinal: Negative.  Negative for abdominal pain, constipation, diarrhea and vomiting.   Endocrine: Positive for polyuria (1-2 times per night). Negative for cold intolerance, heat intolerance, polydipsia and polyphagia.   Genitourinary: Negative.    Musculoskeletal:  Positive for arthralgias and myalgias.   Skin: Negative.    Allergic/Immunologic: Negative.    Neurological: Negative.  Negative for dizziness, syncope, light-headedness and headaches.   Hematological: Negative.    Psychiatric/Behavioral: Negative.     All other systems reviewed and are negative.      Historical Information   Past Medical History:   Diagnosis Date    Asthma     seasonal    Diabetes mellitus (HCC)      Osteoarthritis     Tremor, hereditary, benign      Past Surgical History:   Procedure Laterality Date     SECTION      3    REPLACEMENT TOTAL KNEE Left 2021    TOE SURGERY Bilateral     5 th toe bone removed    TOTAL HIP ARTHROPLASTY Right     TUBAL LIGATION Bilateral      Social History   Social History     Substance and Sexual Activity   Alcohol Use Yes    Alcohol/week: 1.0 standard drink of alcohol    Types: 1 Standard drinks or equivalent per week    Comment: socially     Social History     Substance and Sexual Activity   Drug Use No     Social History     Tobacco Use   Smoking Status Former    Current packs/day: 0.00    Average packs/day: 1 pack/day for 15.1 years (15.1 ttl pk-yrs)    Types: Cigarettes    Start date: 5/10/1992    Quit date: 2007    Years since quittin.2   Smokeless Tobacco Never     Family History:   Family History   Problem Relation Age of Onset    Cirrhosis Mother     Heart disease Mother     Esophageal varices Mother     Brain cancer Father     Prostate cancer Father     Stroke Father     Heart attack Father     Hypertension Father             Skin cancer Brother     COPD Brother     Diabetes type II Maternal Aunt     Diabetes type II Maternal Grandfather             Parkinsonism Maternal Grandfather     Coronary artery disease Brother     Prostate cancer Brother     Post-traumatic stress disorder Son     No Known Problems Son     No Known Problems Daughter     Parkinsonism Maternal Aunt     Diabetes type II Maternal Aunt             Diabetes type I Cousin        Meds/Allergies   Current Outpatient Medications   Medication Sig Dispense Refill    albuterol (PROVENTIL HFA,VENTOLIN HFA) 90 mcg/act inhaler Inhale      BD ULTRA-FINE PEN NEEDLES 29G X 12.7MM MISC USE 4 TIMES DAILY 360 each 3    Calcium Carbonate (CALCIUM 600 PO) Take 1 tablet by mouth daily With vitamin d      Continuous Blood Gluc Sensor (FreeStyle Mae 14 Day Sensor) MISC Use 1  "application every 14 (fourteen) days 2 each 6    docusate sodium (COLACE) 100 mg capsule Take 100 mg by mouth daily  (Patient not taking: Reported on 7/20/2023)      Eliquis 5 MG Take 5 mg by mouth 2 (two) times a day      ferrous sulfate 325 (65 Fe) mg tablet Take 325 mg by mouth 2 (two) times a day      furosemide (LASIX) 40 mg tablet TAKE 1 TABLET BY MOUTH  DAILY AS NEEDED (Patient not taking: Reported on 7/20/2023) 90 tablet 3    insulin detemir (Levemir FlexPen) 100 Units/mL injection pen INJECT SUBCUTANEOUSLY 18 TO 20  UNITS DAILY AT BEDTIME (Patient taking differently: 15 units at HS.) 30 mL 4    insulin lispro (HumaLOG KwikPen) 100 units/mL injection pen INJECT SUBCUTANEOUSLY 2 TO 7  UNITS 4 TIMES DAILY AS DIRECTED 30 mL 1    lisinopril (ZESTRIL) 10 mg tablet Take 10 mg by mouth daily      Magnesium 400 MG CAPS Take by mouth in the morning      metFORMIN (GLUCOPHAGE) 1000 MG tablet TAKE 1 TABLET BY MOUTH TWICE  DAILY WITH MEALS 180 tablet 1    metoprolol tartrate (LOPRESSOR) 25 mg tablet Take 25 mg by mouth 2 (two) times a day      Multiple Vitamins-Minerals (WOMENS 50+ MULTI VITAMIN/MIN PO) Take by mouth      Omega-3 Fatty Acids (FISH OIL) 1,000 mg Take 1,000 mg by mouth daily      rosuvastatin (CRESTOR) 10 MG tablet Take 10 mg by mouth daily      vitamin E, tocopherol, 400 units capsule Take 400 Units by mouth daily       No current facility-administered medications for this visit.     Allergies   Allergen Reactions    Actos [Pioglitazone] Edema     Severe lower extremity    Gentamicin     Other     Oxycodone GI Intolerance    Sulfa Antibiotics        Objective   Vitals: Height 5' 7\" (1.702 m), weight 102 kg (225 lb).    Physical Exam  Vitals reviewed.   Constitutional:       Appearance: She is well-developed. She is obese.   HENT:      Head: Normocephalic and atraumatic.   Eyes:      Conjunctiva/sclera: Conjunctivae normal.      Pupils: Pupils are equal, round, and reactive to light.   Cardiovascular:     "  Rate and Rhythm: Normal rate and regular rhythm.      Heart sounds: Normal heart sounds.   Pulmonary:      Effort: Pulmonary effort is normal.      Breath sounds: Normal breath sounds.   Abdominal:      General: Bowel sounds are normal.      Palpations: Abdomen is soft.   Musculoskeletal:         General: Normal range of motion.      Cervical back: Normal range of motion and neck supple.   Skin:     General: Skin is warm and dry.   Neurological:      Mental Status: She is alert and oriented to person, place, and time.   Psychiatric:         Behavior: Behavior normal.         Thought Content: Thought content normal.         Judgment: Judgment normal.         Lab Results:   Lab Results   Component Value Date/Time    Hemoglobin A1C 7.7 (H) 08/15/2024 11:24 AM    Hemoglobin A1C 6.6 (H) 05/06/2024 08:18 AM    Hemoglobin A1C 7.0 (H) 02/09/2024 08:50 AM    WBC 5.56 08/15/2024 11:24 AM    WBC 5.81 05/06/2024 08:18 AM    WBC 5.94 02/09/2024 08:50 AM    Hemoglobin 11.1 (L) 08/15/2024 11:24 AM    Hemoglobin 11.4 (L) 05/06/2024 08:18 AM    Hemoglobin 11.2 (L) 02/09/2024 08:50 AM    Hematocrit 35.3 08/15/2024 11:24 AM    Hematocrit 34.8 05/06/2024 08:18 AM    Hematocrit 34.0 (L) 02/09/2024 08:50 AM    MCV 98 08/15/2024 11:24 AM    MCV 95 05/06/2024 08:18 AM    MCV 94 02/09/2024 08:50 AM    Platelets 257 08/15/2024 11:24 AM    Platelets 279 05/06/2024 08:18 AM    Platelets 267 02/09/2024 08:50 AM    BUN 16 08/15/2024 11:24 AM    BUN 15 05/06/2024 08:18 AM    BUN 17 02/09/2024 08:50 AM    Potassium 4.7 08/15/2024 11:24 AM    Potassium 4.6 05/06/2024 08:18 AM    Potassium 4.2 02/09/2024 08:50 AM    Chloride 101 08/15/2024 11:24 AM    Chloride 103 05/06/2024 08:18 AM    Chloride 102 02/09/2024 08:50 AM    CO2 28 08/15/2024 11:24 AM    CO2 31 05/06/2024 08:18 AM    CO2 31 02/09/2024 08:50 AM    Creatinine 1.03 08/15/2024 11:24 AM    Creatinine 1.02 05/06/2024 08:18 AM    Creatinine 0.99 02/09/2024 08:50 AM    AST 19 08/15/2024 11:24  "AM    AST 20 05/06/2024 08:18 AM    AST 16 02/09/2024 08:50 AM    ALT 12 08/15/2024 11:24 AM    ALT 15 05/06/2024 08:18 AM    ALT 11 02/09/2024 08:50 AM    Total Protein 6.4 08/15/2024 11:24 AM    Total Protein 6.6 05/06/2024 08:18 AM    Total Protein 6.4 02/09/2024 08:50 AM    Albumin 3.9 08/15/2024 11:24 AM    Albumin 4.1 05/06/2024 08:18 AM    Albumin 4.0 02/09/2024 08:50 AM    HDL, Direct 62 05/06/2024 08:18 AM    HDL, Direct 60 11/03/2023 09:54 AM    Triglycerides 54 05/06/2024 08:18 AM    Triglycerides 48 11/03/2023 09:54 AM     Portions of the record may have been created with voice recognition software. Occasional wrong word or \"sound a like\" substitutions may have occurred due to the inherent limitations of voice recognition software. Read the chart carefully and recognize, using context, where substitutions have occurred.    "

## 2024-08-27 NOTE — PATIENT INSTRUCTIONS
Be mindful of diet.     Watch the ice cream at night !!!!!!!!!!     Stay active and stay hydrated.      For now, continue current regimen with Metformin.     Decrease Humalog to 4 units at breakfast.     Decrease Humalog 2 units at lunch and dinner     Continue Levemir 10 units.    Continue to use the Freestyle Mae 3.     Continue lasix and lisinopril for treatment of your blood pressure.     Continue Omega 3 fatty acid fish oil daily.      Obtain lab work as prescribed.

## 2024-09-13 ENCOUNTER — OFFICE VISIT (OUTPATIENT)
Dept: PODIATRY | Facility: CLINIC | Age: 69
End: 2024-09-13
Payer: MEDICARE

## 2024-09-13 VITALS
DIASTOLIC BLOOD PRESSURE: 75 MMHG | BODY MASS INDEX: 35.16 KG/M2 | SYSTOLIC BLOOD PRESSURE: 130 MMHG | WEIGHT: 224 LBS | HEART RATE: 62 BPM | HEIGHT: 67 IN

## 2024-09-13 DIAGNOSIS — I87.2 CHRONIC VENOUS INSUFFICIENCY: ICD-10-CM

## 2024-09-13 DIAGNOSIS — E11.51 TYPE II DIABETES MELLITUS WITH PERIPHERAL CIRCULATORY DISORDER (HCC): ICD-10-CM

## 2024-09-13 DIAGNOSIS — B35.1 ONYCHOMYCOSIS: ICD-10-CM

## 2024-09-13 DIAGNOSIS — S91.102A OPEN WOUND OF GREAT TOE, LEFT, INITIAL ENCOUNTER: Primary | ICD-10-CM

## 2024-09-13 PROCEDURE — 11721 DEBRIDE NAIL 6 OR MORE: CPT | Performed by: PODIATRIST

## 2024-09-13 PROCEDURE — 97597 DBRDMT OPN WND 1ST 20 CM/<: CPT | Performed by: PODIATRIST

## 2024-09-16 NOTE — PROGRESS NOTES
PATIENT:  Bianca Sena  1955    ASSESSMENT:  No diagnosis found.      No orders of the defined types were placed in this encounter.       PLAN:  Disease prevention and related risk factors of diabetes were identified and discussed.    The patient was educated in proper foot wear for diabetics.    Educated in daily foot assessment and routine diabetic foot care.    Discussed the importance of controlling BS through diet and exercise.    The patient will follow up in 9-12 weeks for further diabetic foot exam and care.    Dry sterile dressing with antibiotic ointment and small square of alginate applied to left great toe.  Patient instructed on appropriate local wound care which should be changed daily with anticipation of this healing within a few days.  If wound fails to heal appropriately within the next couple days patient instructed to call and schedule follow-up sooner than later.    Procedures: 52561, 01545, 31228  All mycotic toenails were reduced and debrided in length, width, and girth using a nail nipper and electric dremel.    All hyperkeratotic skin lesion(s) if present were sharply pared with a #10 scalpel with no evidence of ulceration/abscess.    Patient tolerated procedure(s) well without complications.    Nail removal    Date/Time: 9/13/2024 1:45 PM    Performed by: Jesse Pak DPM  Authorized by: Jesse Pak DPM    Patient location:  Other (comment)Universal Protocol:  Consent: Verbal consent obtained.  Risks and benefits: risks, benefits and alternatives were discussed  Consent given by: patient  Patient understanding: patient states understanding of the procedure being performed  Patient identity confirmed: verbally with patient    Nails trimmed:     Number of nails trimmed:  9  Post-procedure details:     Patient tolerance of procedure:  Tolerated well, no immediate complications  Debridement   Wound Skin tear Toe D1, great Left;Medial;Distal    Universal Protocol:  procedure  "performed by consultantConsent: Verbal consent obtained.  Risks and benefits: risks, benefits and alternatives were discussed  Consent given by: patient  Time out: Immediately prior to procedure a \"time out\" was called to verify the correct patient, procedure, equipment, support staff and site/side marked as required.  Patient understanding: patient states understanding of the procedure being performed  Patient identity confirmed: verbally with patient    Debridement Details  Performed by: physician  Debridement type: selective  Pain control: lidocaine 4%      Post-debridement measurements  Length (cm): 0.3  Width (cm): 0.4  Depth (cm): 0.1  Percent debrided: 100%  Surface Area (cm^2): 0.12  Area Debrided (cm^2): 0.12  Volume (cm^3): 0.01    Devitalized tissue debrided: callus and slough  Instrument(s) utilized: blade and curette  Bleeding: small  Hemostasis obtained with: pressure  Procedural pain (0-10): 2  Post-procedural pain: 2   Response to treatment: procedure was tolerated well         HPI:  Bianca Sena is a 69 y.o.year old female seen for diabetic foot exam and traumatic wound from her dog jumping on her left great toe margin.  This caused a small wound injury and she is concerned about it not healing right with her diabetes..  Patient has class findings with type II DM.   BS is under control.  Patient also concerned of thick painful left great toenail.  Secondary concern of chronic swelling in both legs..    The patient denied any acute pedal disorder, redness, acute swelling, or recent injury.      The following portions of the patient's history were reviewed and updated as appropriate: allergies, current medications, past family history, past medical history, past social history, past surgical history, and problem list.    REVIEW OF SYSTEMS:  GENERAL: No fever or chills  HEART: No chest pain, or palpitation  RESPIRATORY:  No acute SOB or cough  GI: No Nausea, vomit or diarrhea  NEUROLOGIC: No " "syncope or acute weakness    PHYSICAL EXAM:    /75 (BP Location: Left arm, Patient Position: Sitting, Cuff Size: Large)   Pulse 62   Ht 5' 7\" (1.702 m) Comment: stated  Wt 102 kg (224 lb)   BMI 35.08 kg/m²     VASCULAR EXAM:  Posterior tibial artery absent bilateral  Dorsalis pedis artery absent bilateral  The patient has moderate skin atrophy, color changes, lack of digital hair, and nail dystrophy.    There is +2 lower extremity edema bilaterally.      NEUROLOGIC EXAM:  Sensation is intact to light touch. Yes   Sensation is grossly intact to 10gm monofilament.    Vibratory sensation diminished.     Numb tingling paresthesias noted bilateral.         DERMATOLOGIC EXAM:   Texture, Tone and Turgor are diminished bilateral.    The patient has dystrophic/hypertrophic toenails with yellow/white discoloration, onycholysis, and subungal debris.   Fungal odor noted.  Brittle nature noted.  Right foot nails elongated not dystrophic nails x 5   Left foot nails dystrophic x 1 with 0.6 cm ave thickness (hallux)    Patient has hyperkeratotic lesions noted:  Right foot located at none.  Left foot located at none  No notable suspicious skin lesions.      Ulcerations/wounds:  Left great toe proximal medial nail margin has traumatic skin tear secondary to dog jumping on foot with toenail striking causing the wound.  Measures approximately 0.3 x 0.4 x 0.1 post debridement.  No signs of infection.  No erythema or edema other than the chronic +2 edema she has normally in both legs.      MUSCULOSKELETAL EXAM:   No acute joint pain, edema, or redness.  Denies any acute musculoskeletal problem.    Patient has no gross pedal deformities. Patient has no ambulation limitations.      Patient wears DM shoes? No    Risk Category/Class Findings:  Q8(B1, B3)  0 = No loss of protective sensation    A1)  Has the patient had a previous amputation of the foot or integral skeletal portion thereof? No  B1)  Does the patient have absent " posterior tibial pulse? Yes  B3)  Does the patient have absent dorsalis pedis? Yes  B2)  Does the patient have three of the following? Yes           1.  Hair growth (increased or decreased), 2.  Nail changes (thickening), and 3.  Pigmentary changes (discoloring)  C)  Does the patient have two of the following and one above? No

## 2024-09-30 DIAGNOSIS — Z79.4 TYPE 2 DIABETES MELLITUS WITH DIABETIC NEUROPATHY, WITH LONG-TERM CURRENT USE OF INSULIN (HCC): ICD-10-CM

## 2024-09-30 DIAGNOSIS — E11.40 TYPE 2 DIABETES MELLITUS WITH DIABETIC NEUROPATHY, WITH LONG-TERM CURRENT USE OF INSULIN (HCC): ICD-10-CM

## 2024-11-20 DIAGNOSIS — Z79.4 TYPE 2 DIABETES MELLITUS WITH DIABETIC NEUROPATHY, WITH LONG-TERM CURRENT USE OF INSULIN (HCC): Primary | ICD-10-CM

## 2024-11-20 DIAGNOSIS — E11.40 TYPE 2 DIABETES MELLITUS WITH DIABETIC NEUROPATHY, WITH LONG-TERM CURRENT USE OF INSULIN (HCC): Primary | ICD-10-CM

## 2024-11-20 RX ORDER — INSULIN GLARGINE 100 [IU]/ML
INJECTION, SOLUTION SUBCUTANEOUS
Qty: 30 ML | Refills: 4 | Status: SHIPPED | OUTPATIENT
Start: 2024-11-20

## 2024-11-20 NOTE — TELEPHONE ENCOUNTER
Patient was called and made aware that her Levemir prescription is now being changed to Lantus per her insurance

## 2024-11-20 NOTE — TELEPHONE ENCOUNTER
The patient's pharmacy sent the office notification that her Levemir prescription needed to be changed to a covered alternative.     They listed Lantus Solostar as an alternative

## 2024-12-05 ENCOUNTER — APPOINTMENT (OUTPATIENT)
Dept: LAB | Facility: CLINIC | Age: 69
End: 2024-12-05
Payer: MEDICARE

## 2024-12-05 DIAGNOSIS — E66.01 OBESITY, MORBID (HCC): ICD-10-CM

## 2024-12-05 DIAGNOSIS — E11.40 TYPE 2 DIABETES MELLITUS WITH DIABETIC NEUROPATHY, WITH LONG-TERM CURRENT USE OF INSULIN (HCC): ICD-10-CM

## 2024-12-05 DIAGNOSIS — N18.6 TYPE 2 DIABETES MELLITUS WITH ESRD (END-STAGE RENAL DISEASE) (HCC): ICD-10-CM

## 2024-12-05 DIAGNOSIS — E11.29 MICROALBUMINURIA DUE TO TYPE 2 DIABETES MELLITUS  (HCC): ICD-10-CM

## 2024-12-05 DIAGNOSIS — E11.22 TYPE 2 DIABETES MELLITUS WITH ESRD (END-STAGE RENAL DISEASE) (HCC): ICD-10-CM

## 2024-12-05 DIAGNOSIS — E78.2 MIXED HYPERLIPIDEMIA: ICD-10-CM

## 2024-12-05 DIAGNOSIS — I10 ESSENTIAL HYPERTENSION: ICD-10-CM

## 2024-12-05 DIAGNOSIS — Z79.4 TYPE 2 DIABETES MELLITUS WITH DIABETIC NEUROPATHY, WITH LONG-TERM CURRENT USE OF INSULIN (HCC): ICD-10-CM

## 2024-12-05 DIAGNOSIS — R80.9 MICROALBUMINURIA DUE TO TYPE 2 DIABETES MELLITUS  (HCC): ICD-10-CM

## 2024-12-05 DIAGNOSIS — N18.30 STAGE 3 CHRONIC KIDNEY DISEASE, UNSPECIFIED WHETHER STAGE 3A OR 3B CKD (HCC): ICD-10-CM

## 2024-12-05 LAB
ALBUMIN SERPL BCG-MCNC: 4.3 G/DL (ref 3.5–5)
ALP SERPL-CCNC: 45 U/L (ref 34–104)
ALT SERPL W P-5'-P-CCNC: 10 U/L (ref 7–52)
ANION GAP SERPL CALCULATED.3IONS-SCNC: 8 MMOL/L (ref 4–13)
AST SERPL W P-5'-P-CCNC: 15 U/L (ref 13–39)
BASOPHILS # BLD AUTO: 0.02 THOUSANDS/ÂΜL (ref 0–0.1)
BASOPHILS NFR BLD AUTO: 0 % (ref 0–1)
BILIRUB SERPL-MCNC: 0.57 MG/DL (ref 0.2–1)
BUN SERPL-MCNC: 16 MG/DL (ref 5–25)
CALCIUM SERPL-MCNC: 9.5 MG/DL (ref 8.4–10.2)
CHLORIDE SERPL-SCNC: 97 MMOL/L (ref 96–108)
CHOLEST SERPL-MCNC: 139 MG/DL (ref ?–200)
CO2 SERPL-SCNC: 32 MMOL/L (ref 21–32)
CREAT SERPL-MCNC: 1.04 MG/DL (ref 0.6–1.3)
CREAT UR-MCNC: 98.5 MG/DL
EOSINOPHIL # BLD AUTO: 0.42 THOUSAND/ÂΜL (ref 0–0.61)
EOSINOPHIL NFR BLD AUTO: 8 % (ref 0–6)
ERYTHROCYTE [DISTWIDTH] IN BLOOD BY AUTOMATED COUNT: 11.7 % (ref 11.6–15.1)
EST. AVERAGE GLUCOSE BLD GHB EST-MCNC: 174 MG/DL
GFR SERPL CREATININE-BSD FRML MDRD: 54 ML/MIN/1.73SQ M
GLUCOSE P FAST SERPL-MCNC: 172 MG/DL (ref 65–99)
HBA1C MFR BLD: 7.7 %
HCT VFR BLD AUTO: 35.1 % (ref 34.8–46.1)
HDLC SERPL-MCNC: 61 MG/DL
HGB BLD-MCNC: 11.4 G/DL (ref 11.5–15.4)
IMM GRANULOCYTES # BLD AUTO: 0 THOUSAND/UL (ref 0–0.2)
IMM GRANULOCYTES NFR BLD AUTO: 0 % (ref 0–2)
LDLC SERPL CALC-MCNC: 63 MG/DL (ref 0–100)
LYMPHOCYTES # BLD AUTO: 2.05 THOUSANDS/ÂΜL (ref 0.6–4.47)
LYMPHOCYTES NFR BLD AUTO: 38 % (ref 14–44)
MCH RBC QN AUTO: 30.5 PG (ref 26.8–34.3)
MCHC RBC AUTO-ENTMCNC: 32.5 G/DL (ref 31.4–37.4)
MCV RBC AUTO: 94 FL (ref 82–98)
MICROALBUMIN UR-MCNC: <7 MG/L
MONOCYTES # BLD AUTO: 0.48 THOUSAND/ÂΜL (ref 0.17–1.22)
MONOCYTES NFR BLD AUTO: 9 % (ref 4–12)
NEUTROPHILS # BLD AUTO: 2.4 THOUSANDS/ÂΜL (ref 1.85–7.62)
NEUTS SEG NFR BLD AUTO: 45 % (ref 43–75)
NONHDLC SERPL-MCNC: 78 MG/DL
NRBC BLD AUTO-RTO: 0 /100 WBCS
PLATELET # BLD AUTO: 278 THOUSANDS/UL (ref 149–390)
PMV BLD AUTO: 9.7 FL (ref 8.9–12.7)
POTASSIUM SERPL-SCNC: 4.4 MMOL/L (ref 3.5–5.3)
PROT SERPL-MCNC: 6.9 G/DL (ref 6.4–8.4)
RBC # BLD AUTO: 3.74 MILLION/UL (ref 3.81–5.12)
SODIUM SERPL-SCNC: 137 MMOL/L (ref 135–147)
TRIGL SERPL-MCNC: 76 MG/DL (ref ?–150)
TSH SERPL DL<=0.05 MIU/L-ACNC: 3.19 UIU/ML (ref 0.45–4.5)
WBC # BLD AUTO: 5.37 THOUSAND/UL (ref 4.31–10.16)

## 2024-12-05 PROCEDURE — 36415 COLL VENOUS BLD VENIPUNCTURE: CPT

## 2024-12-05 PROCEDURE — 82570 ASSAY OF URINE CREATININE: CPT

## 2024-12-05 PROCEDURE — 82043 UR ALBUMIN QUANTITATIVE: CPT

## 2024-12-05 PROCEDURE — 83036 HEMOGLOBIN GLYCOSYLATED A1C: CPT

## 2024-12-05 PROCEDURE — 85025 COMPLETE CBC W/AUTO DIFF WBC: CPT

## 2024-12-05 PROCEDURE — 80061 LIPID PANEL: CPT

## 2024-12-05 PROCEDURE — 84443 ASSAY THYROID STIM HORMONE: CPT

## 2024-12-05 PROCEDURE — 80053 COMPREHEN METABOLIC PANEL: CPT

## 2024-12-06 ENCOUNTER — RESULTS FOLLOW-UP (OUTPATIENT)
Dept: ENDOCRINOLOGY | Facility: HOSPITAL | Age: 69
End: 2024-12-06

## 2024-12-11 ENCOUNTER — OFFICE VISIT (OUTPATIENT)
Dept: ENDOCRINOLOGY | Facility: HOSPITAL | Age: 69
End: 2024-12-11
Payer: MEDICARE

## 2024-12-11 VITALS
HEART RATE: 68 BPM | WEIGHT: 216 LBS | DIASTOLIC BLOOD PRESSURE: 80 MMHG | HEIGHT: 67 IN | SYSTOLIC BLOOD PRESSURE: 126 MMHG | BODY MASS INDEX: 33.9 KG/M2

## 2024-12-11 DIAGNOSIS — R80.9 MICROALBUMINURIA DUE TO TYPE 2 DIABETES MELLITUS  (HCC): ICD-10-CM

## 2024-12-11 DIAGNOSIS — I89.0 LYMPHEDEMA: ICD-10-CM

## 2024-12-11 DIAGNOSIS — E11.29 MICROALBUMINURIA DUE TO TYPE 2 DIABETES MELLITUS  (HCC): ICD-10-CM

## 2024-12-11 DIAGNOSIS — E66.01 OBESITY, MORBID (HCC): ICD-10-CM

## 2024-12-11 DIAGNOSIS — E78.2 MIXED HYPERLIPIDEMIA: ICD-10-CM

## 2024-12-11 DIAGNOSIS — N18.31 STAGE 3A CHRONIC KIDNEY DISEASE (HCC): ICD-10-CM

## 2024-12-11 DIAGNOSIS — E11.40 TYPE 2 DIABETES MELLITUS WITH DIABETIC NEUROPATHY, WITH LONG-TERM CURRENT USE OF INSULIN (HCC): Primary | ICD-10-CM

## 2024-12-11 DIAGNOSIS — R80.9 MICROALBUMINURIA: ICD-10-CM

## 2024-12-11 DIAGNOSIS — I10 ESSENTIAL HYPERTENSION: ICD-10-CM

## 2024-12-11 DIAGNOSIS — Z79.4 TYPE 2 DIABETES MELLITUS WITH DIABETIC NEUROPATHY, WITH LONG-TERM CURRENT USE OF INSULIN (HCC): Primary | ICD-10-CM

## 2024-12-11 PROCEDURE — 95251 CONT GLUC MNTR ANALYSIS I&R: CPT | Performed by: NURSE PRACTITIONER

## 2024-12-11 PROCEDURE — 99214 OFFICE O/P EST MOD 30 MIN: CPT | Performed by: NURSE PRACTITIONER

## 2024-12-11 NOTE — PROGRESS NOTES
Bianca Sena 69 y.o. female MRN: [id number]    Encounter: [id number]      Assessment & Plan     Assessment:  This is a 69 y.o.-year-old female with type 2 diabetes with neuropathy, hypertension and hyperlipidemia.      Plan:  1.  Type 2 diabetes insulin requiring:  Her most recent hemoglobin A1c is stable at 7.7.  Reviewed her freestyle jerry at the time of the visit.  She is experiencing some hypoglycemia after lunch.  For this I have asked her to decrease her Humalog to 4 units at breakfast, 3 units at lunch and 3-4 units with dinner.  She will also continue her Levemir at 10 units.  I have asked her to send a record of her blood sugars to the office in 2 weeks for review so further changes can be made to help her blood sugars throughout the day.  She was treated with rapid acting carbohydrates and juice. Overall, she felt well the entire time.  Check hemoglobin A1c prior to next visit.     2.  Hypertension/microalbuminuria: She is normotensive in the office today.  Managed by Cardiology.  Continue Lisinopril and Lasix.  Check comprehensive metabolic panel prior to next visit.     3.  Hyperlipidemia: Stable. Continue Omega 3 fatty acid fish oil.  Check fasting lipid panel prior to next visit    CC: Type 2 Diabetes follow up    History of Present Illness     HPI:  68 y.o. year old female with type 2 diabetes for approximately 20 years.  She is on oral agents and insulin at home and takes Metformin 1000 mg twice daily, Levemir insulin 10 units at bedtime, and Humalog insulin 3 unit at breakfast, 3-4 units at lunch and 3-4 at dinner. Her most recent hemoglobin A1c from December 5, 2024 is 7.7. She denies any polydipsia, and blurry vision.  She has no polyphagia.  She has 2-3 times per night nocturia and polyuria. She notes hypoglycemia after taking Humalog after sometimes over night. She has no chest pain or shortness of breath. She denies nephropathy, retinopathy, heart attack, stroke and claudication but does  admit to neuropathy. Her  passed away in June 2020. She was diagnosed with COVID in October 2023 and was treated with Paxlovid.     Downloaded her Freestyle Mae from November 28 through December 11, 2024 reveals an average glucose of 165  with a glucose variability of 38.4.  She is in target range 61% of the time, with 37% hyperglycemia with 2% low readings.        Her most recent diabetic eye exam was December 13, 2022 and no retinopathy. She complains of some numbness and tingling to her feet at times.  Her most recent diabetic foot exam was performed on November 9, 2023 at endocrine office visit.     Her hypertension is treated with Lasix 40 mg daily and lisinopril 5 mg daily.      For her hyperlipidemia, she is taking Omega 3 fatty acid fish oil 1000 mg daily.     Review of Systems   Constitutional: Negative.  Negative for chills, fatigue and fever.   HENT: Negative.  Negative for trouble swallowing and voice change.    Eyes: Negative.  Negative for photophobia, pain, discharge, redness, itching and visual disturbance.   Respiratory: Negative.  Negative for chest tightness and shortness of breath.    Cardiovascular:  Positive for leg swelling. Negative for chest pain.   Gastrointestinal: Negative.  Negative for abdominal pain, constipation, diarrhea and vomiting.   Endocrine: Positive for polyuria (up to twice per night). Negative for cold intolerance, heat intolerance, polydipsia and polyphagia.   Genitourinary: Negative.    Musculoskeletal:  Positive for arthralgias and myalgias.   Skin: Negative.    Allergic/Immunologic: Negative.    Neurological: Negative.  Negative for dizziness, syncope, light-headedness and headaches.   Hematological: Negative.    Psychiatric/Behavioral: Negative.     All other systems reviewed and are negative.      Historical Information   Past Medical History:   Diagnosis Date    Asthma     seasonal    Diabetes mellitus (HCC)     Osteoarthritis     Tremor, hereditary, benign       Past Surgical History:   Procedure Laterality Date     SECTION      3    REPLACEMENT TOTAL KNEE Left 2021    TOE SURGERY Bilateral     5 th toe bone removed    TOTAL HIP ARTHROPLASTY Right     TUBAL LIGATION Bilateral      Social History   Social History     Substance and Sexual Activity   Alcohol Use Yes    Alcohol/week: 1.0 standard drink of alcohol    Types: 1 Standard drinks or equivalent per week    Comment: socially     Social History     Substance and Sexual Activity   Drug Use No     Social History     Tobacco Use   Smoking Status Former    Current packs/day: 0.00    Average packs/day: 1 pack/day for 15.1 years (15.1 ttl pk-yrs)    Types: Cigarettes    Start date: 5/10/1992    Quit date: 2007    Years since quittin.5    Passive exposure: Past   Smokeless Tobacco Never     Family History:   Family History   Problem Relation Age of Onset    Cirrhosis Mother     Heart disease Mother     Esophageal varices Mother     Brain cancer Father     Prostate cancer Father     Stroke Father     Heart attack Father     Hypertension Father             Skin cancer Brother     COPD Brother     Diabetes type II Maternal Aunt     Diabetes type II Maternal Grandfather             Parkinsonism Maternal Grandfather     Coronary artery disease Brother     Prostate cancer Brother     Post-traumatic stress disorder Son     No Known Problems Son     No Known Problems Daughter     Parkinsonism Maternal Aunt     Diabetes type II Maternal Aunt             Diabetes type I Cousin        Meds/Allergies   Current Outpatient Medications   Medication Sig Dispense Refill    albuterol (PROVENTIL HFA,VENTOLIN HFA) 90 mcg/act inhaler Inhale      BD ULTRA-FINE PEN NEEDLES 29G X 12.7MM MISC USE 4 TIMES DAILY 360 each 3    Calcium Carbonate (CALCIUM 600 PO) Take 1 tablet by mouth daily With vitamin d      Continuous Blood Gluc Sensor (FreeStyle Mae 14 Day Sensor) MISC Use 1 application every 14  "(fourteen) days 2 each 6    Eliquis 5 MG Take 5 mg by mouth 2 (two) times a day      ferrous sulfate 325 (65 Fe) mg tablet Take 325 mg by mouth 2 (two) times a day      furosemide (LASIX) 40 mg tablet TAKE 1 TABLET BY MOUTH  DAILY AS NEEDED 90 tablet 3    Insulin Glargine Solostar (Lantus SoloStar) 100 UNIT/ML SOPN Inject 10-12 units at bedtime 30 mL 4    insulin lispro (HumaLOG KwikPen) 100 units/mL injection pen INJECT SUBCUTANEOUSLY 2 TO 7  UNITS 4 TIMES DAILY AS DIRECTED (Patient taking differently: INJECT SUBCUTANEOUSLY 2 TO 7  UNITS 4 TIMES DAILY AS DIRECTED) 30 mL 1    lisinopril (ZESTRIL) 10 mg tablet Take 10 mg by mouth daily      Magnesium 400 MG CAPS Take by mouth in the morning      metFORMIN (GLUCOPHAGE) 1000 MG tablet TAKE 1 TABLET BY MOUTH TWICE  DAILY WITH MEALS 180 tablet 1    metoprolol tartrate (LOPRESSOR) 25 mg tablet Take 25 mg by mouth 2 (two) times a day      Multiple Vitamins-Minerals (WOMENS 50+ MULTI VITAMIN/MIN PO) Take by mouth      Omega-3 Fatty Acids (FISH OIL) 1,000 mg Take 1,000 mg by mouth daily      rosuvastatin (CRESTOR) 10 MG tablet Take 10 mg by mouth daily      vitamin E, tocopherol, 400 units capsule Take 400 Units by mouth daily      docusate sodium (COLACE) 100 mg capsule Take 100 mg by mouth daily  (Patient not taking: Reported on 7/20/2023)      insulin detemir (Levemir FlexPen) 100 Units/mL injection pen INJECT SUBCUTANEOUSLY 18 TO 20  UNITS DAILY AT BEDTIME (Patient not taking: Reported on 12/11/2024) 30 mL 4     No current facility-administered medications for this visit.     Allergies   Allergen Reactions    Actos [Pioglitazone] Edema     Severe lower extremity    Gentamicin     Other     Oxycodone GI Intolerance    Sulfa Antibiotics        Objective   Vitals: Blood pressure 126/80, pulse 68, height 5' 7\" (1.702 m), weight 98 kg (216 lb).    Physical Exam  Vitals reviewed.   Constitutional:       Appearance: She is well-developed. She is obese.   HENT:      Head: " Normocephalic and atraumatic.   Eyes:      Conjunctiva/sclera: Conjunctivae normal.      Pupils: Pupils are equal, round, and reactive to light.   Cardiovascular:      Rate and Rhythm: Normal rate and regular rhythm.      Pulses: no weak pulses.           Dorsalis pedis pulses are 1+ on the right side and 1+ on the left side.        Posterior tibial pulses are 1+ on the right side and 1+ on the left side.      Heart sounds: Normal heart sounds.   Pulmonary:      Effort: Pulmonary effort is normal.      Breath sounds: Normal breath sounds.   Abdominal:      General: Bowel sounds are normal.      Palpations: Abdomen is soft.   Musculoskeletal:         General: Normal range of motion.      Cervical back: Normal range of motion and neck supple.   Feet:      Right foot:      Skin integrity: No ulcer, skin breakdown, erythema, warmth, callus or dry skin.      Left foot:      Skin integrity: No ulcer, skin breakdown, erythema, warmth, callus or dry skin.   Skin:     General: Skin is warm and dry.   Neurological:      Mental Status: She is alert and oriented to person, place, and time.   Psychiatric:         Behavior: Behavior normal.         Thought Content: Thought content normal.         Judgment: Judgment normal.       Patient's shoes and socks removed.    Right Foot/Ankle   Right Foot Inspection  Skin Exam: skin normal and skin intact. No dry skin, no warmth, no callus, no erythema, no maceration, no abnormal color, no pre-ulcer, no ulcer and no callus.     Toe Exam: ROM and strength within normal limits.     Sensory   Monofilament testing: intact    Vascular  Capillary refills: < 3 seconds  The right DP pulse is 1+. The right PT pulse is 1+.     Left Foot/Ankle  Left Foot Inspection  Skin Exam: skin normal and skin intact. No dry skin, no warmth, no erythema, no maceration, normal color, no pre-ulcer, no ulcer and no callus.     Toe Exam: ROM and strength within normal limits.     Sensory   Monofilament testing:  intact    Vascular  Capillary refills: < 3 seconds  The left DP pulse is 1+. The left PT pulse is 1+.     Assign Risk Category  No deformity present  No loss of protective sensation  No weak pulses  Risk: 0      Lab Results:   Lab Results   Component Value Date/Time    Hemoglobin A1C 7.7 (H) 12/05/2024 08:37 AM    Hemoglobin A1C 7.7 (H) 08/15/2024 11:24 AM    Hemoglobin A1C 6.6 (H) 05/06/2024 08:18 AM    WBC 5.37 12/05/2024 08:37 AM    WBC 5.56 08/15/2024 11:24 AM    WBC 5.81 05/06/2024 08:18 AM    Hemoglobin 11.4 (L) 12/05/2024 08:37 AM    Hemoglobin 11.1 (L) 08/15/2024 11:24 AM    Hemoglobin 11.4 (L) 05/06/2024 08:18 AM    Hematocrit 35.1 12/05/2024 08:37 AM    Hematocrit 35.3 08/15/2024 11:24 AM    Hematocrit 34.8 05/06/2024 08:18 AM    MCV 94 12/05/2024 08:37 AM    MCV 98 08/15/2024 11:24 AM    MCV 95 05/06/2024 08:18 AM    Platelets 278 12/05/2024 08:37 AM    Platelets 257 08/15/2024 11:24 AM    Platelets 279 05/06/2024 08:18 AM    BUN 16 12/05/2024 08:37 AM    BUN 16 08/15/2024 11:24 AM    BUN 15 05/06/2024 08:18 AM    Potassium 4.4 12/05/2024 08:37 AM    Potassium 4.7 08/15/2024 11:24 AM    Potassium 4.6 05/06/2024 08:18 AM    Chloride 97 12/05/2024 08:37 AM    Chloride 101 08/15/2024 11:24 AM    Chloride 103 05/06/2024 08:18 AM    CO2 32 12/05/2024 08:37 AM    CO2 28 08/15/2024 11:24 AM    CO2 31 05/06/2024 08:18 AM    Creatinine 1.04 12/05/2024 08:37 AM    Creatinine 1.03 08/15/2024 11:24 AM    Creatinine 1.02 05/06/2024 08:18 AM    AST 15 12/05/2024 08:37 AM    AST 19 08/15/2024 11:24 AM    AST 20 05/06/2024 08:18 AM    ALT 10 12/05/2024 08:37 AM    ALT 12 08/15/2024 11:24 AM    ALT 15 05/06/2024 08:18 AM    Total Protein 6.9 12/05/2024 08:37 AM    Total Protein 6.4 08/15/2024 11:24 AM    Total Protein 6.6 05/06/2024 08:18 AM    Albumin 4.3 12/05/2024 08:37 AM    Albumin 3.9 08/15/2024 11:24 AM    Albumin 4.1 05/06/2024 08:18 AM    HDL, Direct 61 12/05/2024 08:37 AM    HDL, Direct 62 05/06/2024 08:18 AM     "Triglycerides 76 12/05/2024 08:37 AM    Triglycerides 54 05/06/2024 08:18 AM       Portions of the record may have been created with voice recognition software. Occasional wrong word or \"sound a like\" substitutions may have occurred due to the inherent limitations of voice recognition software. Read the chart carefully and recognize, using context, where substitutions have occurred.    "

## 2024-12-11 NOTE — PATIENT INSTRUCTIONS
Be mindful of diet.      Watch the ice cream at night !!!!!!!!!!     Stay active and stay hydrated.      For now, continue current regimen with Metformin.     Continue Humalog 3 units at breakfast.     Decrease Humalog to 3 units at lunch and continue 3-4 units at dinner.     Continue Levemir 10 units.    Be careful of overcompensating for low blood sugar.     Continue to use the Freestyle Mae 3.     Continue lasix and lisinopril for treatment of your blood pressure.     Continue Omega 3 fatty acid fish oil daily.      Obtain lab work as prescribed.

## 2025-01-07 DIAGNOSIS — E11.40 TYPE 2 DIABETES MELLITUS WITH DIABETIC NEUROPATHY, WITH LONG-TERM CURRENT USE OF INSULIN (HCC): ICD-10-CM

## 2025-01-07 DIAGNOSIS — Z79.4 TYPE 2 DIABETES MELLITUS WITH DIABETIC NEUROPATHY, WITH LONG-TERM CURRENT USE OF INSULIN (HCC): ICD-10-CM

## 2025-01-08 RX ORDER — PEN NEEDLE, DIABETIC 29 G X1/2"
NEEDLE, DISPOSABLE MISCELLANEOUS 4 TIMES DAILY
Qty: 360 EACH | Refills: 3 | Status: SHIPPED | OUTPATIENT
Start: 2025-01-08

## 2025-01-10 ENCOUNTER — TELEPHONE (OUTPATIENT)
Dept: ENDOCRINOLOGY | Facility: HOSPITAL | Age: 70
End: 2025-01-10

## 2025-01-10 DIAGNOSIS — Z79.4 TYPE 2 DIABETES MELLITUS WITH DIABETIC NEUROPATHY, WITH LONG-TERM CURRENT USE OF INSULIN (HCC): ICD-10-CM

## 2025-01-10 DIAGNOSIS — E11.40 TYPE 2 DIABETES MELLITUS WITH DIABETIC NEUROPATHY, WITH LONG-TERM CURRENT USE OF INSULIN (HCC): ICD-10-CM

## 2025-01-10 RX ORDER — INSULIN GLARGINE 100 [IU]/ML
INJECTION, SOLUTION SUBCUTANEOUS
Qty: 30 ML | Refills: 4 | Status: SHIPPED | OUTPATIENT
Start: 2025-01-10

## 2025-01-10 NOTE — TELEPHONE ENCOUNTER
Left message advising patient that we got a fax from [facility] advising that Levemir is no longer available and she has to be switched to Lantus.     We haven't sent Levemir since 4/2024 and Lantus was sent in November to her local pharmacy.    I asked her to call back if she wants Lantus sent to [facility] or if she is ok with what was sent to her local pharmacy.

## 2025-01-10 NOTE — TELEPHONE ENCOUNTER
Pt called back and stated that it is fine to send Lantus to [facility]. She doesn't really use [facility]. Any other questions or concerns, please contact pt.

## 2025-02-01 DIAGNOSIS — E11.40 TYPE 2 DIABETES MELLITUS WITH DIABETIC NEUROPATHY, WITH LONG-TERM CURRENT USE OF INSULIN (HCC): ICD-10-CM

## 2025-02-01 DIAGNOSIS — Z79.4 TYPE 2 DIABETES MELLITUS WITH DIABETIC NEUROPATHY, WITH LONG-TERM CURRENT USE OF INSULIN (HCC): ICD-10-CM

## 2025-02-03 RX ORDER — INSULIN LISPRO 100 [IU]/ML
INJECTION, SOLUTION INTRAVENOUS; SUBCUTANEOUS
Qty: 30 ML | Refills: 1 | Status: SHIPPED | OUTPATIENT
Start: 2025-02-03

## 2025-03-01 DIAGNOSIS — Z79.4 TYPE 2 DIABETES MELLITUS WITH DIABETIC NEUROPATHY, WITH LONG-TERM CURRENT USE OF INSULIN (HCC): ICD-10-CM

## 2025-03-01 DIAGNOSIS — E11.40 TYPE 2 DIABETES MELLITUS WITH DIABETIC NEUROPATHY, WITH LONG-TERM CURRENT USE OF INSULIN (HCC): ICD-10-CM

## 2025-03-17 ENCOUNTER — APPOINTMENT (OUTPATIENT)
Dept: LAB | Facility: CLINIC | Age: 70
End: 2025-03-17
Payer: MEDICARE

## 2025-03-17 DIAGNOSIS — E11.29 MICROALBUMINURIA DUE TO TYPE 2 DIABETES MELLITUS  (HCC): ICD-10-CM

## 2025-03-17 DIAGNOSIS — E11.40 TYPE 2 DIABETES MELLITUS WITH DIABETIC NEUROPATHY, WITH LONG-TERM CURRENT USE OF INSULIN (HCC): ICD-10-CM

## 2025-03-17 DIAGNOSIS — I89.0 LYMPHEDEMA: ICD-10-CM

## 2025-03-17 DIAGNOSIS — I10 ESSENTIAL HYPERTENSION: ICD-10-CM

## 2025-03-17 DIAGNOSIS — E78.2 MIXED HYPERLIPIDEMIA: ICD-10-CM

## 2025-03-17 DIAGNOSIS — R80.9 MICROALBUMINURIA: ICD-10-CM

## 2025-03-17 DIAGNOSIS — R80.9 MICROALBUMINURIA DUE TO TYPE 2 DIABETES MELLITUS  (HCC): ICD-10-CM

## 2025-03-17 DIAGNOSIS — Z79.4 TYPE 2 DIABETES MELLITUS WITH DIABETIC NEUROPATHY, WITH LONG-TERM CURRENT USE OF INSULIN (HCC): ICD-10-CM

## 2025-03-17 DIAGNOSIS — N18.31 STAGE 3A CHRONIC KIDNEY DISEASE (HCC): ICD-10-CM

## 2025-03-17 DIAGNOSIS — E66.01 OBESITY, MORBID (HCC): ICD-10-CM

## 2025-03-17 LAB
ALBUMIN SERPL BCG-MCNC: 4.2 G/DL (ref 3.5–5)
ALP SERPL-CCNC: 54 U/L (ref 34–104)
ALT SERPL W P-5'-P-CCNC: 15 U/L (ref 7–52)
ANION GAP SERPL CALCULATED.3IONS-SCNC: 8 MMOL/L (ref 4–13)
AST SERPL W P-5'-P-CCNC: 21 U/L (ref 13–39)
BASOPHILS # BLD AUTO: 0.02 THOUSANDS/ÂΜL (ref 0–0.1)
BASOPHILS NFR BLD AUTO: 0 % (ref 0–1)
BILIRUB SERPL-MCNC: 0.51 MG/DL (ref 0.2–1)
BUN SERPL-MCNC: 14 MG/DL (ref 5–25)
CALCIUM SERPL-MCNC: 9.8 MG/DL (ref 8.4–10.2)
CHLORIDE SERPL-SCNC: 99 MMOL/L (ref 96–108)
CO2 SERPL-SCNC: 29 MMOL/L (ref 21–32)
CREAT SERPL-MCNC: 1.05 MG/DL (ref 0.6–1.3)
EOSINOPHIL # BLD AUTO: 0.23 THOUSAND/ÂΜL (ref 0–0.61)
EOSINOPHIL NFR BLD AUTO: 3 % (ref 0–6)
ERYTHROCYTE [DISTWIDTH] IN BLOOD BY AUTOMATED COUNT: 11.8 % (ref 11.6–15.1)
EST. AVERAGE GLUCOSE BLD GHB EST-MCNC: 192 MG/DL
GFR SERPL CREATININE-BSD FRML MDRD: 53 ML/MIN/1.73SQ M
GLUCOSE SERPL-MCNC: 113 MG/DL (ref 65–140)
HBA1C MFR BLD: 8.3 %
HCT VFR BLD AUTO: 37 % (ref 34.8–46.1)
HGB BLD-MCNC: 12.2 G/DL (ref 11.5–15.4)
IMM GRANULOCYTES # BLD AUTO: 0.01 THOUSAND/UL (ref 0–0.2)
IMM GRANULOCYTES NFR BLD AUTO: 0 % (ref 0–2)
LYMPHOCYTES # BLD AUTO: 3.1 THOUSANDS/ÂΜL (ref 0.6–4.47)
LYMPHOCYTES NFR BLD AUTO: 44 % (ref 14–44)
MCH RBC QN AUTO: 30.8 PG (ref 26.8–34.3)
MCHC RBC AUTO-ENTMCNC: 33 G/DL (ref 31.4–37.4)
MCV RBC AUTO: 93 FL (ref 82–98)
MONOCYTES # BLD AUTO: 0.53 THOUSAND/ÂΜL (ref 0.17–1.22)
MONOCYTES NFR BLD AUTO: 8 % (ref 4–12)
NEUTROPHILS # BLD AUTO: 3.12 THOUSANDS/ÂΜL (ref 1.85–7.62)
NEUTS SEG NFR BLD AUTO: 45 % (ref 43–75)
NRBC BLD AUTO-RTO: 0 /100 WBCS
PLATELET # BLD AUTO: 309 THOUSANDS/UL (ref 149–390)
PMV BLD AUTO: 9.4 FL (ref 8.9–12.7)
POTASSIUM SERPL-SCNC: 4.7 MMOL/L (ref 3.5–5.3)
PROT SERPL-MCNC: 6.9 G/DL (ref 6.4–8.4)
RBC # BLD AUTO: 3.96 MILLION/UL (ref 3.81–5.12)
SODIUM SERPL-SCNC: 136 MMOL/L (ref 135–147)
WBC # BLD AUTO: 7.01 THOUSAND/UL (ref 4.31–10.16)

## 2025-03-17 PROCEDURE — 85025 COMPLETE CBC W/AUTO DIFF WBC: CPT

## 2025-03-17 PROCEDURE — 80053 COMPREHEN METABOLIC PANEL: CPT

## 2025-03-17 PROCEDURE — 83036 HEMOGLOBIN GLYCOSYLATED A1C: CPT

## 2025-03-17 PROCEDURE — 36415 COLL VENOUS BLD VENIPUNCTURE: CPT

## 2025-03-18 ENCOUNTER — RESULTS FOLLOW-UP (OUTPATIENT)
Dept: ENDOCRINOLOGY | Facility: HOSPITAL | Age: 70
End: 2025-03-18

## 2025-03-21 ENCOUNTER — OFFICE VISIT (OUTPATIENT)
Dept: ENDOCRINOLOGY | Facility: HOSPITAL | Age: 70
End: 2025-03-21
Payer: MEDICARE

## 2025-03-21 VITALS
HEIGHT: 67 IN | DIASTOLIC BLOOD PRESSURE: 90 MMHG | BODY MASS INDEX: 32.93 KG/M2 | HEART RATE: 52 BPM | WEIGHT: 209.8 LBS | SYSTOLIC BLOOD PRESSURE: 132 MMHG

## 2025-03-21 DIAGNOSIS — R80.9 MICROALBUMINURIA DUE TO TYPE 2 DIABETES MELLITUS  (HCC): ICD-10-CM

## 2025-03-21 DIAGNOSIS — E11.40 TYPE 2 DIABETES MELLITUS WITH DIABETIC NEUROPATHY, WITH LONG-TERM CURRENT USE OF INSULIN (HCC): Primary | ICD-10-CM

## 2025-03-21 DIAGNOSIS — N18.31 STAGE 3A CHRONIC KIDNEY DISEASE (HCC): ICD-10-CM

## 2025-03-21 DIAGNOSIS — E78.2 MIXED HYPERLIPIDEMIA: ICD-10-CM

## 2025-03-21 DIAGNOSIS — E04.1 THYROID NODULE: ICD-10-CM

## 2025-03-21 DIAGNOSIS — E11.29 MICROALBUMINURIA DUE TO TYPE 2 DIABETES MELLITUS  (HCC): ICD-10-CM

## 2025-03-21 DIAGNOSIS — R80.9 MICROALBUMINURIA: ICD-10-CM

## 2025-03-21 DIAGNOSIS — Z79.4 TYPE 2 DIABETES MELLITUS WITH DIABETIC NEUROPATHY, WITH LONG-TERM CURRENT USE OF INSULIN (HCC): Primary | ICD-10-CM

## 2025-03-21 DIAGNOSIS — E66.01 OBESITY, MORBID (HCC): ICD-10-CM

## 2025-03-21 DIAGNOSIS — I10 ESSENTIAL HYPERTENSION: ICD-10-CM

## 2025-03-21 DIAGNOSIS — I89.0 LYMPHEDEMA: ICD-10-CM

## 2025-03-21 PROCEDURE — 95251 CONT GLUC MNTR ANALYSIS I&R: CPT | Performed by: NURSE PRACTITIONER

## 2025-03-21 PROCEDURE — 99214 OFFICE O/P EST MOD 30 MIN: CPT | Performed by: NURSE PRACTITIONER

## 2025-03-21 NOTE — PATIENT INSTRUCTIONS
Be mindful of diet.      Stay active and stay hydrated.      For now, continue current regimen with Metformin.     Continue Humalog 3 units at breakfast.     Decrease Humalog to 3 units at lunch and continue 3-4 units at dinner.     Continue Lantus 10 units.     Be careful of overcompensating for low blood sugar.     Continue to use the Freestyle Mae 3.    Call there office in 2 weeks for a Freestyle Mae download.     Continue lasix and lisinopril for treatment of your blood pressure.     Continue Omega 3 fatty acid fish oil daily.      Obtain Thyroid Ultrasound when able.    Obtain lab work as prescribed.

## 2025-03-21 NOTE — PROGRESS NOTES
Name: Bianca Sena      : 1955      MRN: [id number]  Encounter Provider: LIZETT Reid  Encounter Date: 3/21/2025   Encounter department: [facility]    No chief complaint on file.  :  Assessment & Plan  Type 2 diabetes mellitus with diabetic neuropathy, with long-term current use of insulin (HCC)    Lab Results   Component Value Date    HGBA1C 8.3 (H) 2025       Orders:    CBC and differential; Future    Comprehensive metabolic panel; Future    Hemoglobin A1C; Future    Lipid panel; Future    Essential hypertension    Orders:    CBC and differential; Future    Comprehensive metabolic panel; Future    Hemoglobin A1C; Future    Lipid panel; Future    Mixed hyperlipidemia    Orders:    CBC and differential; Future    Comprehensive metabolic panel; Future    Hemoglobin A1C; Future    Lipid panel; Future    Obesity, morbid (HCC)    Orders:    CBC and differential; Future    Comprehensive metabolic panel; Future    Hemoglobin A1C; Future    Lipid panel; Future    Microalbuminuria due to type 2 diabetes mellitus  (HCC)    Lab Results   Component Value Date    HGBA1C 8.3 (H) 2025       Orders:    CBC and differential; Future    Comprehensive metabolic panel; Future    Hemoglobin A1C; Future    Lipid panel; Future    Microalbuminuria    Orders:    CBC and differential; Future    Comprehensive metabolic panel; Future    Hemoglobin A1C; Future    Lipid panel; Future    Lymphedema    Orders:    CBC and differential; Future    Comprehensive metabolic panel; Future    Hemoglobin A1C; Future    Lipid panel; Future    Stage 3a chronic kidney disease (HCC)  Lab Results   Component Value Date    EGFR 53 2025    EGFR 54 2024    EGFR 55 08/15/2024    CREATININE 1.05 2025    CREATININE 1.04 2024    CREATININE 1.03 08/15/2024       Orders:    CBC and differential; Future    Comprehensive metabolic panel; Future    Hemoglobin A1C;  Future    Lipid panel; Future    Plan:  1.  Type 2 diabetes insulin requiring:  Her most recent hemoglobin A1c is elevated at 8.3.  Reviewed her freestyle mae at the time of the visit.  Blood sugars are returning to a more stable and controlled state over the past few days now that she is feeling better.  For now, we will continue current regimen and I have asked her to send in a freestyle mae download in 2 weeks for further review and adjustment, if necessary. Check hemoglobin A1c prior to next visit.     2.  Hypertension/microalbuminuria:  Managed by Cardiology.  Continue Lisinopril and Lasix.  Check comprehensive metabolic panel prior to next visit.     3.  Hyperlipidemia: Continue Omega 3 fatty acid fish oil.  Check fasting lipid panel prior to next visit         History of Present Illness     69 y.o. year old female with type 2 diabetes for approximately 20 years.  She is on oral agents and insulin at home and takes Metformin 1000 mg twice daily, Lantus insulin 10 units at bedtime, and Humalog insulin 2-3 unit at breakfast, 2-3 units at lunch and 3-4 at dinner. Her most recent hemoglobin A1c from March 17, 2025 is 8.3. She denies any polydipsia, and blurry vision.  She has no polyphagia.  She has 2-3 times per night nocturia and polyuria. She notes hypoglycemia after taking Humalog after sometimes over night. She has no chest pain or shortness of breath. She denies nephropathy, retinopathy, heart attack, stroke and claudication but does admit to neuropathy. Her  passed away in June 2020. She has been ill with a flu/URI symptoms for the past few weeks.     Downloaded her Freestyle Mae from March 8 and March 21, 2025 reveals an average glucose of 216  with a glucose variability of 40.3.  She is in target range 37% of the time, with 62% hyperglycemia with 1% low readings.        Her most recent diabetic eye exam was December 13, 2022 and no retinopathy. She complains of some numbness and tingling to  "her feet at times.  Her most recent diabetic foot exam was performed on December 11, 2024 at endocrine office visit.     Her hypertension is treated with Lasix 40 mg daily and lisinopril 5 mg daily.      For her hyperlipidemia, she is taking Omega 3 fatty acid fish oil 1000 mg daily.    Last Eye Exam: 12/08/2022  Last Foot Exam: 12/11/2024  Health Maintenance   Topic Date Due    Diabetic Eye Exam  12/08/2024    Diabetic Foot Exam  12/11/2025           Review of Systems   Constitutional:  Positive for fatigue. Negative for chills and fever.   HENT:  Positive for congestion (URI symptoms) and postnasal drip (Recent URI). Negative for trouble swallowing and voice change.    Eyes: Negative.  Negative for photophobia, pain, discharge, redness, itching and visual disturbance.   Respiratory: Negative.  Negative for chest tightness and shortness of breath.    Cardiovascular:  Positive for leg swelling. Negative for chest pain.   Gastrointestinal: Negative.  Negative for abdominal pain, constipation, diarrhea and vomiting.   Endocrine: Negative for cold intolerance, heat intolerance, polydipsia, polyphagia and polyuria.   Genitourinary: Negative.    Musculoskeletal:  Positive for arthralgias and myalgias.   Skin: Negative.    Allergic/Immunologic: Negative.    Neurological: Negative.  Negative for dizziness, syncope, light-headedness and headaches.   Hematological: Negative.    Psychiatric/Behavioral: Negative.     All other systems reviewed and are negative.   as per HPI    .    Objective   /90   Pulse (!) 52   Ht 5' 7\" (1.702 m)   Wt 95.2 kg (209 lb 12.8 oz)   BMI 32.86 kg/m²      Body mass index is 32.86 kg/m².  Wt Readings from Last 3 Encounters:   03/21/25 95.2 kg (209 lb 12.8 oz)   12/11/24 98 kg (216 lb)   09/13/24 102 kg (224 lb)     Physical Exam  Vitals reviewed.   Constitutional:       Appearance: She is well-developed. She is obese.   HENT:      Head: Normocephalic and atraumatic.   Eyes:      " Conjunctiva/sclera: Conjunctivae normal.      Pupils: Pupils are equal, round, and reactive to light.   Cardiovascular:      Rate and Rhythm: Normal rate and regular rhythm.      Heart sounds: Normal heart sounds.   Pulmonary:      Effort: Pulmonary effort is normal.      Breath sounds: Normal breath sounds.   Abdominal:      General: Bowel sounds are normal.      Palpations: Abdomen is soft.   Musculoskeletal:         General: Swelling present. Normal range of motion.      Cervical back: Normal range of motion and neck supple.      Right lower leg: Edema present.      Left lower leg: Edema present.   Skin:     General: Skin is warm and dry.   Neurological:      Mental Status: She is alert and oriented to person, place, and time.   Psychiatric:         Behavior: Behavior normal.         Thought Content: Thought content normal.         Judgment: Judgment normal.         Labs:   Lab Results   Component Value Date    HGBA1C 8.3 (H) 03/17/2025    HGBA1C 7.7 (H) 12/05/2024    HGBA1C 7.7 (H) 08/15/2024     Lab Results   Component Value Date    CREATININE 1.05 03/17/2025    CREATININE 1.04 12/05/2024    CREATININE 1.03 08/15/2024    BUN 14 03/17/2025     11/10/2015    K 4.7 03/17/2025    CL 99 03/17/2025    CO2 29 03/17/2025     eGFR   Date Value Ref Range Status   03/17/2025 53 ml/min/1.73sq m Final     Lab Results   Component Value Date    CHOL 162 05/17/2014    HDL 61 12/05/2024    TRIG 76 12/05/2024     Lab Results   Component Value Date    ALT 15 03/17/2025    AST 21 03/17/2025    ALKPHOS 54 03/17/2025    BILITOT 0.42 11/10/2015     Lab Results   Component Value Date    KHL6FTVKOEUS 3.189 12/05/2024    CYA1KQKIDHIL 2.666 05/06/2024    NUR0LRMQLORY 2.567 11/03/2023       There are no Patient Instructions on file for this visit.    Discussed with the patient and all questioned fully answered. She will call me if any problems arise.

## 2025-03-21 NOTE — ASSESSMENT & PLAN NOTE
Orders:    CBC and differential; Future    Comprehensive metabolic panel; Future    Hemoglobin A1C; Future    Lipid panel; Future

## 2025-03-21 NOTE — ASSESSMENT & PLAN NOTE
Lab Results   Component Value Date    HGBA1C 8.3 (H) 03/17/2025       Orders:    CBC and differential; Future    Comprehensive metabolic panel; Future    Hemoglobin A1C; Future    Lipid panel; Future

## 2025-03-21 NOTE — ASSESSMENT & PLAN NOTE
Lab Results   Component Value Date    EGFR 53 03/17/2025    EGFR 54 12/05/2024    EGFR 55 08/15/2024    CREATININE 1.05 03/17/2025    CREATININE 1.04 12/05/2024    CREATININE 1.03 08/15/2024       Orders:    CBC and differential; Future    Comprehensive metabolic panel; Future    Hemoglobin A1C; Future    Lipid panel; Future    Plan:  1.  Type 2 diabetes insulin requiring:  Her most recent hemoglobin A1c is elevated at 8.3.  Reviewed her freestyle jerry at the time of the visit.  Blood sugars are returning to a more stable and controlled state over the past few days now that she is feeling better.  For now, we will continue current regimen and I have asked her to send in a freestyle jerry download in 2 weeks for further review and adjustment, if necessary. Check hemoglobin A1c prior to next visit.     2.  Hypertension/microalbuminuria:  Managed by Cardiology.  Continue Lisinopril and Lasix.  Check comprehensive metabolic panel prior to next visit.     3.  Hyperlipidemia: Continue Omega 3 fatty acid fish oil.  Check fasting lipid panel prior to next visit

## 2025-04-03 ENCOUNTER — HOSPITAL ENCOUNTER (OUTPATIENT)
Dept: ULTRASOUND IMAGING | Facility: CLINIC | Age: 70
Discharge: HOME | End: 2025-04-03
Payer: MEDICARE

## 2025-04-03 DIAGNOSIS — E04.1 THYROID NODULE: ICD-10-CM

## 2025-04-03 PROCEDURE — 76536 US EXAM OF HEAD AND NECK: CPT

## 2025-04-08 ENCOUNTER — RESULTS FOLLOW-UP (OUTPATIENT)
Dept: ENDOCRINOLOGY | Facility: HOSPITAL | Age: 70
End: 2025-04-08

## 2025-04-08 NOTE — RESULT ENCOUNTER NOTE
Please call the patient regarding abnormal result.  Thyroid ultrasound reveals 3 nodules-right-sided and 2 left-sided nodules.  All with a lower index of suspicion.  They do not meet criteria for biopsy.  We will follow-up with another ultrasound in 1 year for reassessment.

## 2025-05-14 ENCOUNTER — DOCUMENTATION (OUTPATIENT)
Dept: ENDOCRINOLOGY | Facility: HOSPITAL | Age: 70
End: 2025-05-14

## 2025-05-14 NOTE — PROGRESS NOTES
[facility] Med order for Mae 3 plus supplies completed and faxed with the last chart note to [phone].

## 2025-06-23 ENCOUNTER — APPOINTMENT (OUTPATIENT)
Dept: LAB | Facility: CLINIC | Age: 70
End: 2025-06-23
Payer: MEDICARE

## 2025-06-23 DIAGNOSIS — Z79.4 TYPE 2 DIABETES MELLITUS WITH DIABETIC NEUROPATHY, WITH LONG-TERM CURRENT USE OF INSULIN (HCC): ICD-10-CM

## 2025-06-23 DIAGNOSIS — E11.40 TYPE 2 DIABETES MELLITUS WITH DIABETIC NEUROPATHY, WITH LONG-TERM CURRENT USE OF INSULIN (HCC): ICD-10-CM

## 2025-06-23 DIAGNOSIS — E78.2 MIXED HYPERLIPIDEMIA: ICD-10-CM

## 2025-06-23 DIAGNOSIS — I89.0 LYMPHEDEMA: ICD-10-CM

## 2025-06-23 DIAGNOSIS — I10 ESSENTIAL HYPERTENSION, MALIGNANT: ICD-10-CM

## 2025-06-23 DIAGNOSIS — I10 ESSENTIAL HYPERTENSION: ICD-10-CM

## 2025-06-23 DIAGNOSIS — R80.9 MICROALBUMINURIA: ICD-10-CM

## 2025-06-23 DIAGNOSIS — R80.9 MICROALBUMINURIA DUE TO TYPE 2 DIABETES MELLITUS  (HCC): ICD-10-CM

## 2025-06-23 DIAGNOSIS — N18.31 STAGE 3A CHRONIC KIDNEY DISEASE (HCC): ICD-10-CM

## 2025-06-23 DIAGNOSIS — E11.69 TYPE 2 DIABETES MELLITUS WITH OTHER SPECIFIED COMPLICATION, WITH LONG-TERM CURRENT USE OF INSULIN (HCC): ICD-10-CM

## 2025-06-23 DIAGNOSIS — E11.29 MICROALBUMINURIA DUE TO TYPE 2 DIABETES MELLITUS  (HCC): ICD-10-CM

## 2025-06-23 DIAGNOSIS — Z79.4 ENCOUNTER FOR LONG-TERM (CURRENT) USE OF INSULIN (HCC): ICD-10-CM

## 2025-06-23 DIAGNOSIS — Z79.4 TYPE 2 DIABETES MELLITUS WITH OTHER SPECIFIED COMPLICATION, WITH LONG-TERM CURRENT USE OF INSULIN (HCC): ICD-10-CM

## 2025-06-23 DIAGNOSIS — E66.01 OBESITY, MORBID (HCC): ICD-10-CM

## 2025-06-23 LAB
ALBUMIN SERPL BCG-MCNC: 4.1 G/DL (ref 3.5–5)
ALP SERPL-CCNC: 54 U/L (ref 34–104)
ALT SERPL W P-5'-P-CCNC: 21 U/L (ref 7–52)
ANION GAP SERPL CALCULATED.3IONS-SCNC: 9 MMOL/L (ref 4–13)
AST SERPL W P-5'-P-CCNC: 23 U/L (ref 13–39)
BASOPHILS # BLD AUTO: 0.04 THOUSANDS/ÂΜL (ref 0–0.1)
BASOPHILS NFR BLD AUTO: 1 % (ref 0–1)
BILIRUB SERPL-MCNC: 0.58 MG/DL (ref 0.2–1)
BUN SERPL-MCNC: 18 MG/DL (ref 5–25)
CALCIUM SERPL-MCNC: 9.8 MG/DL (ref 8.4–10.2)
CHLORIDE SERPL-SCNC: 97 MMOL/L (ref 96–108)
CHOLEST SERPL-MCNC: 139 MG/DL (ref ?–200)
CO2 SERPL-SCNC: 31 MMOL/L (ref 21–32)
CREAT SERPL-MCNC: 1.13 MG/DL (ref 0.6–1.3)
EOSINOPHIL # BLD AUTO: 0.52 THOUSAND/ÂΜL (ref 0–0.61)
EOSINOPHIL NFR BLD AUTO: 8 % (ref 0–6)
ERYTHROCYTE [DISTWIDTH] IN BLOOD BY AUTOMATED COUNT: 11.8 % (ref 11.6–15.1)
EST. AVERAGE GLUCOSE BLD GHB EST-MCNC: 177 MG/DL
GFR SERPL CREATININE-BSD FRML MDRD: 49 ML/MIN/1.73SQ M
GLUCOSE P FAST SERPL-MCNC: 204 MG/DL (ref 65–99)
HBA1C MFR BLD: 7.8 %
HCT VFR BLD AUTO: 33.5 % (ref 34.8–46.1)
HDLC SERPL-MCNC: 60 MG/DL
HGB BLD-MCNC: 11.3 G/DL (ref 11.5–15.4)
IMM GRANULOCYTES # BLD AUTO: 0.02 THOUSAND/UL (ref 0–0.2)
IMM GRANULOCYTES NFR BLD AUTO: 0 % (ref 0–2)
LDLC SERPL CALC-MCNC: 65 MG/DL (ref 0–100)
LYMPHOCYTES # BLD AUTO: 2.37 THOUSANDS/ÂΜL (ref 0.6–4.47)
LYMPHOCYTES NFR BLD AUTO: 36 % (ref 14–44)
MCH RBC QN AUTO: 31.1 PG (ref 26.8–34.3)
MCHC RBC AUTO-ENTMCNC: 33.7 G/DL (ref 31.4–37.4)
MCV RBC AUTO: 92 FL (ref 82–98)
MONOCYTES # BLD AUTO: 0.54 THOUSAND/ÂΜL (ref 0.17–1.22)
MONOCYTES NFR BLD AUTO: 8 % (ref 4–12)
NEUTROPHILS # BLD AUTO: 3.19 THOUSANDS/ÂΜL (ref 1.85–7.62)
NEUTS SEG NFR BLD AUTO: 47 % (ref 43–75)
NRBC BLD AUTO-RTO: 0 /100 WBCS
PLATELET # BLD AUTO: 261 THOUSANDS/UL (ref 149–390)
PMV BLD AUTO: 9.4 FL (ref 8.9–12.7)
POTASSIUM SERPL-SCNC: 4.4 MMOL/L (ref 3.5–5.3)
PROT SERPL-MCNC: 6.7 G/DL (ref 6.4–8.4)
RBC # BLD AUTO: 3.63 MILLION/UL (ref 3.81–5.12)
SODIUM SERPL-SCNC: 137 MMOL/L (ref 135–147)
TRIGL SERPL-MCNC: 68 MG/DL (ref ?–150)
WBC # BLD AUTO: 6.68 THOUSAND/UL (ref 4.31–10.16)

## 2025-06-23 PROCEDURE — 80053 COMPREHEN METABOLIC PANEL: CPT

## 2025-06-23 PROCEDURE — 83036 HEMOGLOBIN GLYCOSYLATED A1C: CPT

## 2025-06-23 PROCEDURE — 80061 LIPID PANEL: CPT

## 2025-06-23 PROCEDURE — 85025 COMPLETE CBC W/AUTO DIFF WBC: CPT

## 2025-06-23 PROCEDURE — 36415 COLL VENOUS BLD VENIPUNCTURE: CPT

## 2025-07-02 ENCOUNTER — OFFICE VISIT (OUTPATIENT)
Dept: ENDOCRINOLOGY | Facility: HOSPITAL | Age: 70
End: 2025-07-02
Payer: MEDICARE

## 2025-07-02 VITALS
BODY MASS INDEX: 34.15 KG/M2 | DIASTOLIC BLOOD PRESSURE: 80 MMHG | SYSTOLIC BLOOD PRESSURE: 132 MMHG | HEIGHT: 67 IN | WEIGHT: 217.6 LBS | HEART RATE: 58 BPM

## 2025-07-02 DIAGNOSIS — E78.2 MIXED HYPERLIPIDEMIA: ICD-10-CM

## 2025-07-02 DIAGNOSIS — E66.01 OBESITY, MORBID (HCC): ICD-10-CM

## 2025-07-02 DIAGNOSIS — R80.9 MICROALBUMINURIA DUE TO TYPE 2 DIABETES MELLITUS  (HCC): ICD-10-CM

## 2025-07-02 DIAGNOSIS — E11.40 TYPE 2 DIABETES MELLITUS WITH DIABETIC NEUROPATHY, WITH LONG-TERM CURRENT USE OF INSULIN (HCC): Primary | ICD-10-CM

## 2025-07-02 DIAGNOSIS — I10 ESSENTIAL HYPERTENSION: ICD-10-CM

## 2025-07-02 DIAGNOSIS — Z79.4 TYPE 2 DIABETES MELLITUS WITH DIABETIC NEUROPATHY, WITH LONG-TERM CURRENT USE OF INSULIN (HCC): Primary | ICD-10-CM

## 2025-07-02 DIAGNOSIS — R80.9 MICROALBUMINURIA: ICD-10-CM

## 2025-07-02 DIAGNOSIS — E11.29 MICROALBUMINURIA DUE TO TYPE 2 DIABETES MELLITUS  (HCC): ICD-10-CM

## 2025-07-02 DIAGNOSIS — E04.1 THYROID NODULE: ICD-10-CM

## 2025-07-02 DIAGNOSIS — I89.0 LYMPHEDEMA: ICD-10-CM

## 2025-07-02 DIAGNOSIS — N18.31 STAGE 3A CHRONIC KIDNEY DISEASE (HCC): ICD-10-CM

## 2025-07-02 PROCEDURE — 99214 OFFICE O/P EST MOD 30 MIN: CPT | Performed by: NURSE PRACTITIONER

## 2025-07-02 PROCEDURE — 95251 CONT GLUC MNTR ANALYSIS I&R: CPT | Performed by: NURSE PRACTITIONER

## 2025-07-02 NOTE — ASSESSMENT & PLAN NOTE
Lab Results   Component Value Date    HGBA1C 7.8 (H) 06/23/2025       Orders:    CBC and differential; Future    Comprehensive metabolic panel; Future    Hemoglobin A1C; Future    TSH, 3rd generation with Free T4 reflex; Future

## 2025-07-02 NOTE — ASSESSMENT & PLAN NOTE
Lab Results   Component Value Date    EGFR 49 06/23/2025    EGFR 53 03/17/2025    EGFR 54 12/05/2024    CREATININE 1.13 06/23/2025    CREATININE 1.05 03/17/2025    CREATININE 1.04 12/05/2024       Orders:    CBC and differential; Future    Comprehensive metabolic panel; Future    Hemoglobin A1C; Future    TSH, 3rd generation with Free T4 reflex; Future

## 2025-07-02 NOTE — PATIENT INSTRUCTIONS
Be mindful of diet.      Stay active and stay hydrated.      For now, continue current regimen with Metformin.     Continue Humalog 3-4 units at at meals and 2 units with snacks.     Increase Lantus to 12 units.     Be careful of overcompensating for low blood sugar.     Continue to use the Freestyle Mae 3.     Call there office in 2 weeks for a Freestyle Mae download.     Continue lasix and lisinopril for treatment of your blood pressure.     Continue Omega 3 fatty acid fish oil daily.      Obtain lab work as prescribed.

## 2025-07-02 NOTE — PROGRESS NOTES
Name: Bianca Sena      : 1955      MRN: [id number]  Encounter Provider: LIZETT Reid  Encounter Date: 2025   Encounter department: [facility]    No chief complaint on file.  :  Assessment & Plan  Type 2 diabetes mellitus with diabetic neuropathy, with long-term current use of insulin (HCC)    Lab Results   Component Value Date    HGBA1C 7.8 (H) 2025       Orders:    CBC and differential; Future    Comprehensive metabolic panel; Future    Hemoglobin A1C; Future    TSH, 3rd generation with Free T4 reflex; Future    Essential hypertension    Orders:    CBC and differential; Future    Comprehensive metabolic panel; Future    Hemoglobin A1C; Future    TSH, 3rd generation with Free T4 reflex; Future    Mixed hyperlipidemia    Orders:    CBC and differential; Future    Comprehensive metabolic panel; Future    Hemoglobin A1C; Future    TSH, 3rd generation with Free T4 reflex; Future    Obesity, morbid (HCC)    Orders:    CBC and differential; Future    Comprehensive metabolic panel; Future    Hemoglobin A1C; Future    TSH, 3rd generation with Free T4 reflex; Future    Microalbuminuria due to type 2 diabetes mellitus  (HCC)    Lab Results   Component Value Date    HGBA1C 7.8 (H) 2025       Orders:    CBC and differential; Future    Comprehensive metabolic panel; Future    Hemoglobin A1C; Future    TSH, 3rd generation with Free T4 reflex; Future    Microalbuminuria    Orders:    CBC and differential; Future    Comprehensive metabolic panel; Future    Hemoglobin A1C; Future    TSH, 3rd generation with Free T4 reflex; Future    Thyroid nodule    Orders:    CBC and differential; Future    Comprehensive metabolic panel; Future    Hemoglobin A1C; Future    TSH, 3rd generation with Free T4 reflex; Future    Stage 3a chronic kidney disease (HCC)  Lab Results   Component Value Date    EGFR 49 2025    EGFR 53 2025    EGFR 54 2024     CREATININE 1.13 06/23/2025    CREATININE 1.05 03/17/2025    CREATININE 1.04 12/05/2024       Orders:    CBC and differential; Future    Comprehensive metabolic panel; Future    Hemoglobin A1C; Future    TSH, 3rd generation with Free T4 reflex; Future    Lymphedema    Orders:    CBC and differential; Future    Comprehensive metabolic panel; Future    Hemoglobin A1C; Future    TSH, 3rd generation with Free T4 reflex; Future    Plan:  1.  Type 2 diabetes insulin requiring:  Her most recent hemoglobin A1c is improved to 7.8 but remains above goal.  Reviewed her freestyle jerry at the time of the visit.  Blood sugars are returning to a more stable and controlled state over the past few days now that she is feeling better.  For now, we will continue current regimen and I have asked her to send in a freestyle jerry download in 2 weeks for further review and adjustment, if necessary. Check hemoglobin A1c prior to next visit.     2.  Hypertension/microalbuminuria:  Managed by Cardiology.  Continue Lisinopril and Lasix.  Check comprehensive metabolic panel prior to next visit.     3.  Hyperlipidemia: Stable.  Continue Omega 3 fatty acid fish oil.       4.  Thyroid nodules:  Stable nodules that do not meet criteria for biopsy.      History of Present Illness     69 y.o. year old female with type 2 diabetes for approximately 20 years.  She is on oral agents and insulin at home and takes Metformin 1000 mg twice daily, Lantus insulin 10 units at bedtime, and Humalog insulin 2-3 unit at breakfast, 2-3 units at lunch and 3-4 at dinner. Her most recent hemoglobin A1c from June 23, 2025 is 7.8. She denies any polydipsia, and blurry vision.  She has no polyphagia.  She has 2-3 times per night nocturia and polyuria. She notes hypoglycemia after taking Humalog after sometimes over night. She has no chest pain or shortness of breath. She denies nephropathy, retinopathy, heart attack, stroke and claudication but does admit to neuropathy.  Her  passed away in June 2020.     Downloaded her Freestyle Mae from June 19 through July 2, 2025 shows an average glucose of 189 with a glucose variability of 38.9.  She is in target range 46% of the time with 52% elevated readings and less than 2% low readings.        Her most recent diabetic eye exam was December 13, 2022 and no retinopathy. She complains of some numbness and tingling to her feet at times.  Her most recent diabetic foot exam was performed on December 11, 2024 at endocrine office visit.     Her hypertension is treated with Lasix 40 mg daily and lisinopril 5 mg daily.      For her hyperlipidemia, she is taking Omega 3 fatty acid fish oil 1000 mg daily.    Review of Systems   Constitutional: Negative.  Negative for chills, fatigue and fever.   HENT: Negative.  Negative for trouble swallowing and voice change.    Eyes: Negative.  Negative for photophobia, pain, discharge, redness, itching and visual disturbance.   Respiratory: Negative.  Negative for chest tightness and shortness of breath.    Cardiovascular: Negative.  Negative for chest pain.   Gastrointestinal: Negative.  Negative for abdominal pain, constipation, diarrhea and vomiting.   Endocrine: Negative for cold intolerance, heat intolerance, polydipsia, polyphagia and polyuria.   Genitourinary: Negative.    Musculoskeletal: Negative.    Skin: Negative.    Allergic/Immunologic: Negative.    Neurological: Negative.  Negative for dizziness, syncope, light-headedness and headaches.   Hematological: Negative.    Psychiatric/Behavioral: Negative.     All other systems reviewed and are negative.   as per HPI        Objective   There were no vitals taken for this visit.     There is no height or weight on file to calculate BMI.  Wt Readings from Last 3 Encounters:   03/21/25 95.2 kg (209 lb 12.8 oz)   12/11/24 98 kg (216 lb)   09/13/24 102 kg (224 lb)     Physical Exam  Vitals reviewed.   Constitutional:       Appearance: She is  well-developed.   HENT:      Head: Normocephalic and atraumatic.     Eyes:      Conjunctiva/sclera: Conjunctivae normal.      Pupils: Pupils are equal, round, and reactive to light.       Cardiovascular:      Rate and Rhythm: Normal rate and regular rhythm.      Heart sounds: Normal heart sounds.   Pulmonary:      Effort: Pulmonary effort is normal.      Breath sounds: Normal breath sounds.   Abdominal:      General: Bowel sounds are normal.      Palpations: Abdomen is soft.     Musculoskeletal:         General: Normal range of motion.      Cervical back: Normal range of motion and neck supple.     Skin:     General: Skin is warm and dry.     Neurological:      Mental Status: She is alert and oriented to person, place, and time.     Psychiatric:         Behavior: Behavior normal.         Thought Content: Thought content normal.         Judgment: Judgment normal.       Last Eye Exam: 12/08/2022  Last Foot Exam: 12/11/2024  Health Maintenance   Topic Date Due    Diabetic Eye Exam  12/08/2024    Diabetic Foot Exam  12/11/2025         Labs: I have reviewed pertinent labs including:   Lab Results   Component Value Date    HGBA1C 7.8 (H) 06/23/2025    HGBA1C 8.3 (H) 03/17/2025    HGBA1C 7.7 (H) 12/05/2024      Lab Results   Component Value Date    CREATININE 1.13 06/23/2025    CREATININE 1.05 03/17/2025    CREATININE 1.04 12/05/2024    BUN 18 06/23/2025     11/10/2015    K 4.4 06/23/2025    CL 97 06/23/2025    CO2 31 06/23/2025      eGFR   Date Value Ref Range Status   06/23/2025 49 ml/min/1.73sq m Final      Cholesterol   Date Value Ref Range Status   05/17/2014 162 mg/dL Final     Comment:     CHOLESTEROL:       Desirable           <200 mg/dl       Borderline High     200-239 mg/dl       High                   >239 mg/dl  ____________________________________       HDL   Date Value Ref Range Status   05/17/2014 68 mg/dL Final     Comment:     HDL:       High       >59 mg/dl       Low        <41  mg/dl  ______________________________       HDL, Direct   Date Value Ref Range Status   06/23/2025 60 >=50 mg/dL Final     Comment:     HDL Cholesterol:       Low     <41 mg/dL     Triglycerides   Date Value Ref Range Status   06/23/2025 68 See Comment mg/dL Final     Comment:     Triglyceride:     0-9Y            <75mg/dL     10Y-17Y         <90 mg/dL       >=18Y     Normal          <150 mg/dL     Borderline High 150-199 mg/dL     High            200-499 mg/dL        Very High       >499 mg/dL    Specimen collection should occur prior to Metamizole administration due to the potential for falsely depressed results.   05/17/2014 35 mg/dL Final     Comment:     TRIGLYCERIDE:       Normal                 <150 mg/dl       Borderline High       150-199 mg/dl       High                  200-499 mg/dl       Very High             >499 mg/dl  _______________________________________        ALT   Date Value Ref Range Status   06/23/2025 21 7 - 52 U/L Final     Comment:     Specimen collection should occur prior to Sulfasalazine administration due to the potential for falsely depressed results.    11/10/2015 23 12 - 78 U/L Final     AST   Date Value Ref Range Status   06/23/2025 23 13 - 39 U/L Final   11/10/2015 14 5 - 45 U/L Final     Alkaline Phosphatase   Date Value Ref Range Status   06/23/2025 54 34 - 104 U/L Final   11/10/2015 77 46 - 116 U/L Final     Total Bilirubin   Date Value Ref Range Status   11/10/2015 0.42 0.20 - 1.00 mg/dL Final      Lab Results   Component Value Date    [id number] 3.189 12/05/2024         THYROID ULTRASOUND     INDICATION: E04.1: Nontoxic single thyroid nodule.     COMPARISON: None     TECHNIQUE: Ultrasound of the thyroid was performed with a high frequency linear transducer in transverse and sagittal planes including volumetric imaging sweeps as well as traditional still imaging technique.     FINDINGS:  Thyroid texture: Normal homogeneous smooth echotexture.     Right lobe: 5.8 x 1.5 x 1.9  cm. Volume 7.8 mL  Left lobe: 4.7 x 1.4 x 1.9 cm. Volume 5.9 mL  Isthmus: 0.4 cm.     Nodule #1. Image 16.  RIGHT lower pole nodule measuring 2.0 x 1.2 x 1.3 cm. No priors for comparison.  COMPOSITION: 2 points, solid or almost completely solid.  ECHOGENICITY: 1 point, hyperechoic or isoechoic.  SHAPE: 0 points, wider-than-tall.  MARGIN: 0 points, smooth.  ECHOGENIC FOCI: 0 points, none or large comet-tail artifacts.  TI-RADS Classification: TR 3 (3 points). FNA if >/= 2.5 cm. Follow if >/= 1.5 cm.     Nodule #2. Image 64.  LEFT lower pole nodule measuring 1.3 x 0.9 x 1.2 cm. No priors for comparison.  COMPOSITION: 1 point, mixed cystic and solid.  ECHOGENICITY: 1 point, hyperechoic or isoechoic.  SHAPE: 0 points, wider-than-tall.  MARGIN: 0 points, smooth.  ECHOGENIC FOCI: 0 points, none or large comet-tail artifacts.  TI-RADS Classification: TR 2 (2 points), Not suspicious. No FNA.     Nodule #3. Image 74.  LEFT lower pole nodule measuring 1.2 x 0.5 x 1.0 cm. No priors for comparison.  COMPOSITION: 1 point, mixed cystic and solid.  ECHOGENICITY: 1 point, hyperechoic or isoechoic.  SHAPE: 0 points, wider-than-tall.  MARGIN: 0 points, smooth.  ECHOGENIC FOCI: 0 points, none or large comet-tail artifacts.  TI-RADS Classification: TR 2 (2 points), Not suspicious. No FNA.     There are additional nodules of lesser size and/or TI-RADS score. These do not necessitate additional evaluation based on ACR criteria.     IMPRESSION:     No nodule meets current ACR criteria for requiring biopsy but follow-up ultrasound is recommended in 1 year.        Discussed with the patient and all questioned fully answered. She will call me if any problems arise.

## 2025-07-03 ENCOUNTER — TELEPHONE (OUTPATIENT)
Dept: ADMINISTRATIVE | Facility: OTHER | Age: 70
End: 2025-07-03

## 2025-07-03 NOTE — LETTER
Diabetic Eye Exam Form    Date Requested: 25  Patient: Bianca Sena  Patient : 1955   Referring Provider: Sha Magallanes,       DIABETIC Eye Exam Date _______________________________      Type of Exam MUST be documented for Diabetic Eye Exams. Please CHECK ONE.     Retinal Exam       Dilated Retinal Exam       OCT       Optomap-Iris Exam      Fundus Photography       Left Eye - Please check Retinopathy or No Retinopathy        Exam did show retinopathy    Exam did not show retinopathy       Right Eye - Please check Retinopathy or No Retinopathy       Exam did show retinopathy    Exam did not show retinopathy       Comments __________________________________________________________    Practice Providing Exam ______________________________________________    Exam Performed By (print name) _______________________________________      Provider Signature ___________________________________________________      These reports are needed for  compliance.    Please fax this completed form and a copy of the Diabetic Eye Exam report to the [facility] Based Department as soon as possible via Fax [phone], attention Marilynn: Phone [phone]. Our office is located at [address] [place] [address].     We thank you for your assistance in treating our mutual patient.

## 2025-07-03 NOTE — TELEPHONE ENCOUNTER
----- Message from Neva POZO sent at 7/2/2025  1:34 PM EDT -----  Regarding: DM EYE EXAM  07/02/25 1:34 PM    Hello, our patient attached above has had a DM Eye Exam performed at [facility]. Their number is [phone] and their fax number is [phone].    Thank you,  Neva Richards MA  [facility] [place]

## 2025-07-03 NOTE — TELEPHONE ENCOUNTER
Upon review of the In Basket request and the patient's chart, initial outreach has been made via fax to facility. Please see Contacts section for details.     Thank you  Marilynn Christopher

## 2025-07-18 NOTE — TELEPHONE ENCOUNTER
07/18/25 11:10 AM     Chart reviewed for Diabetic Eye Exam was/were submitted to the patient's insurance.     Marilynn Christopher   [facility]

## 2025-07-18 NOTE — TELEPHONE ENCOUNTER
Upon review of the In Basket request we were able to locate, review, and update the patient chart as requested for Diabetic Eye Exam.    Any additional questions or concerns should be emailed to the Practice Liaisons via the appropriate education email address, please do not reply via In Basket.    Thank you  Marilynn Christopher   [facility]